# Patient Record
Sex: FEMALE | Race: WHITE | Employment: OTHER | URBAN - METROPOLITAN AREA
[De-identification: names, ages, dates, MRNs, and addresses within clinical notes are randomized per-mention and may not be internally consistent; named-entity substitution may affect disease eponyms.]

---

## 2017-01-04 RX ORDER — CANAGLIFLOZIN 100 MG/1
TABLET, FILM COATED ORAL
Qty: 30 TAB | Refills: 0 | Status: SHIPPED | OUTPATIENT
Start: 2017-01-04 | End: 2017-01-06 | Stop reason: SDUPTHER

## 2017-01-09 RX ORDER — LORAZEPAM 0.5 MG/1
TABLET ORAL
Qty: 60 TAB | Refills: 1 | OUTPATIENT
Start: 2017-01-09 | End: 2017-08-29 | Stop reason: SDUPTHER

## 2017-01-11 RX ORDER — TEMAZEPAM 30 MG/1
CAPSULE ORAL
Qty: 30 CAP | Refills: 2 | OUTPATIENT
Start: 2017-01-11 | End: 2017-04-26 | Stop reason: ALTCHOICE

## 2017-01-20 DIAGNOSIS — Z12.31 VISIT FOR SCREENING MAMMOGRAM: ICD-10-CM

## 2017-02-08 RX ORDER — TRAMADOL HYDROCHLORIDE 50 MG/1
TABLET ORAL
Qty: 60 TAB | Refills: 0 | OUTPATIENT
Start: 2017-02-08

## 2017-02-08 RX ORDER — CANAGLIFLOZIN 100 MG/1
TABLET, FILM COATED ORAL
Qty: 30 TAB | Refills: 0 | Status: SHIPPED | OUTPATIENT
Start: 2017-02-08 | End: 2017-02-16 | Stop reason: SDUPTHER

## 2017-02-16 ENCOUNTER — OFFICE VISIT (OUTPATIENT)
Dept: FAMILY MEDICINE CLINIC | Age: 57
End: 2017-02-16

## 2017-02-16 VITALS
SYSTOLIC BLOOD PRESSURE: 110 MMHG | TEMPERATURE: 98.2 F | HEART RATE: 86 BPM | WEIGHT: 138 LBS | RESPIRATION RATE: 12 BRPM | HEIGHT: 64 IN | OXYGEN SATURATION: 100 % | BODY MASS INDEX: 23.56 KG/M2 | DIASTOLIC BLOOD PRESSURE: 84 MMHG

## 2017-02-16 DIAGNOSIS — E11.9 CONTROLLED TYPE 2 DIABETES MELLITUS WITHOUT COMPLICATION, WITHOUT LONG-TERM CURRENT USE OF INSULIN (HCC): Primary | ICD-10-CM

## 2017-02-16 DIAGNOSIS — J01.00 ACUTE NON-RECURRENT MAXILLARY SINUSITIS: ICD-10-CM

## 2017-02-16 DIAGNOSIS — M51.36 DDD (DEGENERATIVE DISC DISEASE), LUMBAR: ICD-10-CM

## 2017-02-16 RX ORDER — FLUCONAZOLE 150 MG/1
150 TABLET ORAL DAILY
Qty: 2 TAB | Refills: 0 | Status: SHIPPED | OUTPATIENT
Start: 2017-02-16 | End: 2017-02-17

## 2017-02-16 RX ORDER — AMOXICILLIN 875 MG/1
875 TABLET, FILM COATED ORAL 2 TIMES DAILY
Qty: 20 TAB | Refills: 0 | Status: SHIPPED | OUTPATIENT
Start: 2017-02-16 | End: 2017-04-26 | Stop reason: ALTCHOICE

## 2017-02-16 RX ORDER — TRAMADOL HYDROCHLORIDE 50 MG/1
50 TABLET ORAL
Qty: 60 TAB | Refills: 2 | Status: SHIPPED | OUTPATIENT
Start: 2017-02-16 | End: 2017-05-23 | Stop reason: SDUPTHER

## 2017-02-16 NOTE — MR AVS SNAPSHOT
Visit Information Date & Time Provider Department Dept. Phone Encounter #  
 2/16/2017  1:45 PM Nehemiah Lerma NP [facility] [phone] [id number] Follow-up Instructions Return if symptoms worsen or fail to improve. Upcoming Health Maintenance Date Due Pneumococcal 19-64 Medium Risk (1 of 1 - PPSV23) 6/30/1977 DTaP/Tdap/Td series (1 - Tdap) 6/30/1979 EYE EXAM RETINAL OR DILATED Q1 8/18/2016 HEMOGLOBIN A1C Q6M 5/28/2017 FOOT EXAM Q1 11/28/2017 MICROALBUMIN Q1 11/28/2017 LIPID PANEL Q1 11/28/2017 COLONOSCOPY 1/1/2018 BREAST CANCER SCRN MAMMOGRAM 12/29/2018 Allergies as of 2/16/2017  Review Complete On: 2/16/2017 By: Nehemiah Lerma NP No Known Allergies Current Immunizations  Reviewed on 11/23/2015 Name Date Influenza Vaccine 10/29/2013 Influenza Vaccine Mittie Shouts) 12/5/2014 Influenza Vaccine (Quad) PF 11/28/2016, 11/23/2015 Influenza Vaccine Whole 10/1/2011 Not reviewed this visit You Were Diagnosed With   
  
 Codes Comments DDD (degenerative disc disease), lumbar    -  Primary ICD-10-CM: M51.36 
ICD-9-CM: 722.52 Controlled type 2 diabetes mellitus without complication, without long-term current use of insulin ([facility].)     ICD-10-CM: E11.9 ICD-9-CM: 250.00 Acute non-recurrent maxillary sinusitis     ICD-10-CM: J01.00 ICD-9-CM: 461.0 Vitals BP Pulse Temp Resp Height(growth percentile) Weight(growth percentile) 110/84 86 98.2 °F (36.8 °C) 12 5' 4\" (1.626 m) 138 lb (62.6 kg) LMP SpO2 BMI OB Status Smoking Status (LMP Unknown) 100% 23.69 kg/m2 Hysterectomy Never Smoker Vitals History BMI and BSA Data Body Mass Index Body Surface Area  
 23.69 kg/m 2 1.68 m 2 Preferred Pharmacy Pharmacy Name Phone [address] [facility] [address] ([address] [phone] Your Updated Medication List  
  
 This list is accurate as of: 2/16/17  1:51 PM.  Always use your most recent med list.  
  
  
  
  
 [address] [facility] Generic drug:  Lancets MONITOR TWICE DAILY (AS DIRECTED)  
  
 amoxicillin 875 mg tablet Commonly known as:  AMOXIL Take 1 Tab by mouth two (2) times a day. B COMPLEX PO Take  by mouth. baclofen 10 mg tablet Commonly known as:  LIORESAL  
TAKE 1 TABLET THREE TIMES DAILY AS NEEDED FOR SPASMS  
  
 BD Single Use Swabs Regular Padm Generic drug:  alcohol swabs USE WITH BLOOD SUGAR CHECKS Blood-Glucose Meter Misc Commonly known as:  ACCU-CHEK LUCY PLUS METER Bid monitoring dx: 250.00 CALCIUM 500 + D PO Take  by mouth.  
  
 canagliflozin 100 mg tablet Commonly known as:  Levern Pinta TAKE 1 TABLET BY MOUTH DAILY BEFORE BREAKFAST. cholecalciferol (vitamin D3) 2,000 unit Tab Take  by mouth.  
  
 cyanocobalamin 100 mcg tablet Commonly known as:  VITAMIN B12 Take 100 mcg by mouth daily. DULoxetine 30 mg capsule Commonly known as:  CYMBALTA Take 1 Cap by mouth daily. estradiol 0.01 % (0.1 mg/gram) vaginal cream  
Commonly known as:  ESTRACE Insert 2 g into vagina daily. fluconazole 150 mg tablet Commonly known as:  DIFLUCAN Take 1 Tab by mouth daily for 1 day. May repeat in 3 days if needed  
  
 gabapentin 300 mg capsule Commonly known as:  NEURONTIN  
TAKE 1 CAPSULE THREE TIMES DAILY * glucose blood VI test strips strip Commonly known as:  ACCU-CHEK LUCY PLUS TEST STRP Bid monitoring dx: 250.00 * ACCU-CHEK LUCY PLUS TEST STRP strip Generic drug:  glucose blood VI test strips TEST TWICE DAILY HEALTHY EYES PO Take  by mouth. hydroCHLOROthiazide 12.5 mg tablet Commonly known as:  HYDRODIURIL  
TAKE 1 TABLET EVERY DAY  
  
 lidocaine 2 % jelly Commonly known as:  XYLOCAINE  
APPLY  THIN  FILM TOPICALLY TO AFFECTED AREA(S) ONLY (LOW BACK AND NECK ONLY) ONE TIME DAILY AS NEEDED LORazepam 0.5 mg tablet Commonly known as:  ATIVAN  
TAKE 1 TABLET BY MOUTH TWICE DAILY AS NEEDED FOR ANXIETY- MUST LAST 30 DAYS  
  
 meloxicam 15 mg tablet Commonly known as:  MOBIC  
TAKE 1 TABLET EVERY DAY  
  
 metFORMIN  mg tablet Commonly known as:  GLUCOPHAGE XR  
TAKE 2 TABLETS DAILY WITH DINNER  
  
 mometasone 0.1 % topical cream  
Commonly known as:  ELOCON  
APPLY TO THE AFFECTED AREA TWICE A DAY AS NEEDED FOR ALLERGIC RASH  
  
 montelukast 10 mg tablet Commonly known as:  SINGULAIR  
TAKE 1 TABLET EVERY DAY  
  
 MULTIVITAMIN PO Take  by mouth daily. mupirocin 2 % ointment Commonly known as:  [employer] Apply  to affected area two (2) times a day. As needed for skin infection  
  
 nystatin topical cream  
Commonly known as:  MYCOSTATIN  
APPLY TO THE AFFECTED AREA TWICE A DAY  
  
 rosuvastatin 5 mg tablet Commonly known as:  CRESTOR  
TAKE 1 TABLET EVERY NIGHT  
  
 SUMAtriptan 100 mg tablet Commonly known as:  IMITREX Take 1 Tab by mouth once as needed for Migraine for up to 1 dose. SUPER B COMPLEX-VITAMIN C PO Take  by mouth. temazepam 30 mg capsule Commonly known as:  RESTORIL  
TAKE 1 CAPSULE BY MOUTH NIGHTLY AS NEEDED FOR SLEEP  
  
 * topiramate 25 mg tablet Commonly known as:  TOPAMAX 2 po qhs  
  
 * topiramate 50 mg tablet Commonly known as:  TOPAMAX TAKE 1 TABLET TWICE DAILY  
  
 traMADol 50 mg tablet Commonly known as:  ULTRAM  
Take 1 Tab by mouth two (2) times daily as needed for Pain. - must last 30 days  
  
 undecylenic acid-chloroxylenol 25-3 % Soln  
by Apply Externally route. vitamin e 400 unit Tab Take  by mouth. * Notice: This list has 4 medication(s) that are the same as other medications prescribed for you. Read the directions carefully, and ask your doctor or other care provider to review them with you. Prescriptions Printed Refills traMADol (ULTRAM) 50 mg tablet 2 Sig: Take 1 Tab by mouth two (2) times daily as needed for Pain. - must last 30 days Class: Print Route: Oral  
  
Prescriptions Sent to Pharmacy Refills  
 amoxicillin (AMOXIL) 875 mg tablet 0 Sig: Take 1 Tab by mouth two (2) times a day. Class: Normal  
 Pharm[facility] [address] Ph #: [phone] Route: Oral  
 fluconazole (DIFLUCAN) 150 mg tablet 0 Sig: Take 1 Tab by mouth daily for 1 day. May repeat in 3 days if needed Class: Normal  
 Pharm[facility] [address] Ph #: [phone] Route: Oral  
  
Follow-up Instructions Return if symptoms worsen or fail to improve. To-Do List   
 02/16/2017 Lab:  HEMOGLOBIN A1C WITH EAG   
  
 02/16/2017 Lab:  LIPID PANEL   
  
 02/16/2017 Lab:  METABOLIC PANEL, COMPREHENSIVE Patient Instructions Nutrition Tips for Diabetes: After Your Visit Your Care Instructions A healthy diet is important to manage diabetes. It helps you lose weight (if you need to) and keep it off. It gives you the nutrition and energy your body needs and helps prevent heart disease. But a diet for diabetes does not mean that you have to eat special foods. You can eat what your family eats, including occasional sweets and other favorites. But you do have to pay attention to how often you eat and how much you eat of certain foods. The right plan for you will give you meals that help you keep your blood sugar at healthy levels. Try to eat a variety of foods and to spread carbohydrate throughout the day. Carbohydrate raises blood sugar higher and more quickly than any other nutrient does. Carbohydrate is found in sugar, breads and cereals, fruit, starchy vegetables such as potatoes and corn, and milk and yogurt. You may want to work with a dietitian or diabetes educator to help you plan meals and snacks. A dietitian or diabetes educator also can help you lose weight if that is one of your goals. The following tips can help you enjoy your meals and stay healthy. Follow-up care is a key part of your treatment and safety. Be sure to make and go to all appointments, and call your doctor if you are having problems. Its also a good idea to know your test results and keep a list of the medicines you take. How can you care for yourself at home? · Learn which foods have carbohydrate and how much carbohydrate to eat. A dietitian or diabetes educator can help you learn to keep track of how much carbohydrate you eat. · Spread carbohydrate throughout the day. Eat some carbohydrate at all meals, but do not eat too much at any one time. · Plan meals to include food from all the food groups. These are the food groups and some example portion sizes: ¨ Grains: 1 slice of bread (1 ounce), ½ cup of cooked cereal, and 1/3 cup of cooked pasta or rice. These have about 15 grams of carbohydrate in a serving. Choose whole grains such as whole wheat bread or crackers, oatmeal, and brown rice more often than refined grains. ¨ Fruit: 1 small fresh fruit, such as an apple or orange; ½ of a banana; ½ cup of chopped, cooked, or canned fruit; ½ cup of fruit juice; 1 cup of melon or raspberries; and 2 tablespoons of dried fruit. These have about 15 grams of carbohydrate in a serving. ¨ Dairy: 1 cup of nonfat or low-fat milk and 2/3 cup of plain yogurt. These have about 15 grams of carbohydrate in a serving. ¨ Protein foods: Beef, chicken, turkey, fish, eggs, tofu, cheese, cottage cheese, and peanut butter. A serving size of meat is 3 ounces, which is about the size of a deck of cards.  Examples of meat substitute serving sizes (equal to 1 ounce of meat) are 1/4 cup of cottage cheese, 1 egg, 1 tablespoon of peanut butter, and ½ cup of tofu. These have very little or no carbohydrate per serving. ¨ Vegetables: Starchy vegetables such as ½ cup of cooked dried beans, peas, potatoes, or corn have about 15 grams of carbohydrate. Nonstarchy vegetables have very little carbohydrate, such as 1 cup of raw leafy vegetables (such as spinach), ½ cup of other vegetables (cooked or chopped), and 3/4 cup of vegetable juice. · Use the plate format to plan meals. It is a good, quick way to make sure that you have a balanced meal. It also helps you spread carbohydrate throughout the day. You divide your plate by types of foods. Put vegetables on half the plate, meat or meat substitutes on one-quarter of the plate, and a grain or starchy vegetable (such as brown rice or a potato) in the final quarter of the plate. To this you can add a small piece of fruit and 1 cup of milk or yogurt, depending on how much carbohydrate you are supposed to eat at a meal. 
· Talk to your dietitian or diabetes educator about ways to add limited amounts of sweets into your meal plan. You can eat these foods now and then, as long as you include the amount of carbohydrate they have in your daily carbohydrate allowance. · If you drink alcohol, limit it to no more than 1 drink a day for women and 2 drinks a day for men. If you are pregnant, no amount of alcohol is known to be safe. · Protein, fat, and fiber do not raise blood sugar as much as carbohydrate does. If you eat a lot of these nutrients in a meal, your blood sugar will rise more slowly than it would otherwise. · Limit saturated fats, such as those from meat and dairy products. Try to replace it with monounsaturated fat, such as olive oil. This is a healthier choice because people who have diabetes are at higher-than-average risk of heart disease. But use a modest amount of olive oil. A tablespoon of olive oil has 14 grams of fat and 120 calories. · Exercise lowers blood sugar. If you take insulin by shots or pump, you can use less than you would if you were not exercising. Keep in mind that timing matters. If you exercise within 1 hour after a meal, your body may need less insulin for that meal than it would if you exercised 3 hours after the meal. Test your blood sugar to find out how exercise affects your need for insulin. · Exercise on most days of the week. Aim for at least 30 minutes. Exercise helps you stay at a healthy weight and helps your body use insulin. Walking is an easy way to get exercise. Gradually increase the amount you walk every day. You also may want to swim, bike, or do other activities. When you eat out · Learn to estimate the serving sizes of foods that have carbohydrate. If you measure food at home, it will be easier to estimate the amount in a serving of restaurant food. · If the meal you order has too much carbohydrate (such as potatoes, corn, or baked beans), ask to have a low-carbohydrate food instead. Ask for a salad or green vegetables. · If you use insulin, check your blood sugar before and after eating out to help you plan how much to eat in the future. · If you eat more carbohydrate at a meal than you had planned, take a walk or do other exercise. This will help lower your blood sugar. Where can you learn more? Go to [employer] Enter P607 in the search box to learn more about \"Nutrition Tips for Diabetes: After Your Visit. \"  
© 1943-5889 [employer]. Care instructions adapted under license by Jenny Kaur (which disclaims liability or warranty for this information). This care instruction is for use with your licensed healthcare professional. If you have questions about a medical condition or this instruction, always ask your healthcare professional. [address] any warranty or liability for your use of this information. Content Version: 50.9.773222; Current as of: June 4, 2014 Introducing [facility] SERVICES! Dear Aristides Colvin: 
Thank you for requesting a [employer] account. Our records indicate that you already have an active [employer] account. You can access your account anytime at [email or web address]. [email or web address] Did you know that you can access your hospital and ER discharge instructions at any time in [employer]? You can also review all of your test results from your hospital stay or ER visit. Additional Information If you have questions, please visit the Frequently Asked Questions section of the [employer] website at [email or web address] Remember, [employer] is NOT to be used for urgent needs. For medical emergencies, dial 911. Now available from your iPhone and Android! Please provide this summary of care documentation to your next provider. Your primary care clinician is listed as Lacey Jones. If you have any questions after today's visit, please call [phone].

## 2017-02-16 NOTE — PATIENT INSTRUCTIONS

## 2017-02-16 NOTE — PROGRESS NOTES
Chief Complaint   Patient presents with    Medication Refill    Labs    Sinus Infection     she is a 60y.o. year old female who presents for evalution. Pt here for Tramadol refills. Also has been sinus pain and pressure intermittently for past few weeks. Seems like worsening in past week. Lots of thick congestion, coughing a lot and keeping pt up at night. Bad headaches. No fever or chills. Pt also needs DM labs, states sugars have been running around 165. Takes medication daily. Had eye exam in December which was normal.  No episodes of hypoglycemia. Reviewed PmHx, RxHx, FmHx, SocHx, AllgHx and updated and dated in the chart. Review of Systems - negative except as listed above in the HPI    Objective:     Vitals:    02/16/17 1329   BP: 110/84   Pulse: 86   Resp: 12   Temp: 98.2 °F (36.8 °C)   SpO2: 100%   Weight: 138 lb (62.6 kg)   Height: 5' 4\" (1.626 m)     Physical Examination: General appearance - alert, well appearing, and in no distress  Ears - bilateral TM's and external ear canals normal  Nose - mucosal congestion, mucosal erythema and sinus tenderness noted frontal  Mouth - mucous membranes moist, pharynx normal without lesions and erythematous  Neck - supple, no significant adenopathy  Chest - clear to auscultation, no wheezes, rales or rhonchi, symmetric air entry  Heart - normal rate, regular rhythm, normal S1, S2, no murmurs, rubs, clicks or gallops  Back exam - antalgic gait, limited range of motion, pain with motion noted during exam    Assessment/ Plan:   Cyndi Morrison was seen today for medication refill, labs and sinus infection. Diagnoses and all orders for this visit:    Controlled type 2 diabetes mellitus without complication, without long-term current use of insulin ([address]  -     METABOLIC PANEL, COMPREHENSIVE; Future  -     LIPID PANEL; Future  -     HEMOGLOBIN A1C WITH EAG; Future  Discussed importance of diabetic diet, need for regular exercise.   Reviewed disease process and complications that arise from poor control of blood sugars - kidney damage, blindness, amputation, paresthesias. Goal of HgbA1c below 7 and LDL below 100. Need for yearly eye exam and regular foot care. Cont current medications, F/U 3 mo. Acute non-recurrent maxillary sinusitis  -     amoxicillin (AMOXIL) 875 mg tablet; Take 1 Tab by mouth two (2) times a day. -     fluconazole (DIFLUCAN) 150 mg tablet; Take 1 Tab by mouth daily for 1 day. May repeat in 3 days if needed  New rxs. Discussed and encouraged rest and clear fluids, if congestion is thick should avoid dairy. Recommended hot showers with steam and elevating head of bed. May use Vitamin C or Echinacea in addition to current therapy. DDD (degenerative disc disease), lumbar  -     traMADol (ULTRAM) 50 mg tablet; Take 1 Tab by mouth two (2) times daily as needed for Pain. - must last 30 days  Reviewed , pt taking appropriately. Refill provided. Pt voiced understanding regarding plan of care. Follow-up Disposition:  Return if symptoms worsen or fail to improve. I have discussed the diagnosis with the patient and the intended plan as seen in the above orders. The patient has received an after-visit summary and questions were answered concerning future plans.      Medication Side Effects and Warnings were discussed with patient    Lila Rand NP

## 2017-02-17 RX ORDER — BLOOD SUGAR DIAGNOSTIC
STRIP MISCELLANEOUS
Qty: 200 STRIP | Refills: 3 | Status: SHIPPED | OUTPATIENT
Start: 2017-02-17 | End: 2017-04-19

## 2017-02-17 RX ORDER — LANCETS
EACH MISCELLANEOUS
Qty: 200 EACH | Refills: 3 | Status: SHIPPED | OUTPATIENT
Start: 2017-02-17 | End: 2017-04-19

## 2017-02-17 RX ORDER — MOMETASONE FUROATE 1 MG/G
CREAM TOPICAL
Qty: 15 G | Refills: 0 | Status: SHIPPED | OUTPATIENT
Start: 2017-02-17 | End: 2018-06-11 | Stop reason: SDUPTHER

## 2017-02-17 RX ORDER — LIDOCAINE HYDROCHLORIDE 20 MG/ML
JELLY TOPICAL
Qty: 30 ML | Refills: 1 | Status: SHIPPED | OUTPATIENT
Start: 2017-02-17 | End: 2017-07-06 | Stop reason: SDUPTHER

## 2017-02-17 RX ORDER — ISOPROPYL ALCOHOL 0.75 G/1
SWAB TOPICAL
Qty: 300 PAD | Refills: 3 | Status: SHIPPED | OUTPATIENT
Start: 2017-02-17 | End: 2017-04-19

## 2017-04-13 ENCOUNTER — OFFICE VISIT (OUTPATIENT)
Dept: FAMILY MEDICINE CLINIC | Age: 57
End: 2017-04-13

## 2017-04-13 VITALS
OXYGEN SATURATION: 98 % | HEART RATE: 104 BPM | RESPIRATION RATE: 16 BRPM | SYSTOLIC BLOOD PRESSURE: 122 MMHG | BODY MASS INDEX: 23.08 KG/M2 | TEMPERATURE: 98.5 F | DIASTOLIC BLOOD PRESSURE: 82 MMHG | HEIGHT: 64 IN | WEIGHT: 135.2 LBS

## 2017-04-13 DIAGNOSIS — R82.90 ABNORMAL URINALYSIS: Primary | ICD-10-CM

## 2017-04-13 DIAGNOSIS — E11.9 CONTROLLED TYPE 2 DIABETES MELLITUS WITHOUT COMPLICATION, WITHOUT LONG-TERM CURRENT USE OF INSULIN (HCC): ICD-10-CM

## 2017-04-13 DIAGNOSIS — R35.0 URINARY FREQUENCY: ICD-10-CM

## 2017-04-13 DIAGNOSIS — Z98.84 STATUS POST GASTRIC BANDING: ICD-10-CM

## 2017-04-13 LAB
BILIRUB UR QL STRIP: NEGATIVE
GLUCOSE UR-MCNC: NORMAL MG/DL
KETONES P FAST UR STRIP-MCNC: NORMAL MG/DL
PH UR STRIP: 5 [PH] (ref 4.6–8)
PROT UR QL STRIP: NEGATIVE MG/DL
SP GR UR STRIP: 1.01 (ref 1–1.03)
UA UROBILINOGEN AMB POC: NORMAL (ref 0.2–1)
URINALYSIS CLARITY POC: CLEAR
URINALYSIS COLOR POC: YELLOW
URINE BLOOD POC: NORMAL
URINE LEUKOCYTES POC: NEGATIVE
URINE NITRITES POC: NEGATIVE

## 2017-04-13 RX ORDER — NITROFURANTOIN 25; 75 MG/1; MG/1
100 CAPSULE ORAL 2 TIMES DAILY
Qty: 14 CAP | Refills: 0 | Status: SHIPPED | OUTPATIENT
Start: 2017-04-13 | End: 2017-04-20

## 2017-04-13 NOTE — MR AVS SNAPSHOT
Visit Information Date & Time Provider Department Dept. Phone Encounter #  
 4/13/2017 11:20 AM Castillo Liang MD [address] [facility] [phone] [id number] Upcoming Health Maintenance Date Due Pneumococcal 19-64 Medium Risk (1 of 1 - PPSV23) 6/30/1977 DTaP/Tdap/Td series (1 - Tdap) 6/30/1979 EYE EXAM RETINAL OR DILATED Q1 8/18/2016 HEMOGLOBIN A1C Q6M 5/28/2017 FOOT EXAM Q1 11/28/2017 MICROALBUMIN Q1 11/28/2017 LIPID PANEL Q1 11/28/2017 COLONOSCOPY 1/1/2018 BREAST CANCER SCRN MAMMOGRAM 12/29/2018 Allergies as of 4/13/2017  Review Complete On: 4/13/2017 By: Castillo Liang MD  
 No Known Allergies Current Immunizations  Reviewed on 11/23/2015 Name Date Influenza Vaccine 10/29/2013 Influenza Vaccine Rehan Wendi) 12/5/2014 Influenza Vaccine (Quad) PF 11/28/2016, 11/23/2015 Influenza Vaccine Whole 10/1/2011 Not reviewed this visit You Were Diagnosed With   
  
 Codes Comments Abnormal urinalysis    -  Primary ICD-10-CM: R82.90 ICD-9-CM: 791.9 Controlled type 2 diabetes mellitus without complication, without long-term current use of insulin ([facility].)     ICD-10-CM: E11.9 ICD-9-CM: 250.00 Urinary frequency     ICD-10-CM: R35.0 ICD-9-CM: 788.41 Status post gastric banding     ICD-10-CM: Z98.84 ICD-9-CM: V45.86 Vitals BP Pulse Temp Resp Height(growth percentile) Weight(growth percentile) 122/82 (BP 1 Location: Left arm, BP Patient Position: Sitting) (!) 104 98.5 °F (36.9 °C) (Oral) 16 5' 4\" (1.626 m) 135 lb 3.2 oz (61.3 kg) LMP SpO2 BMI OB Status Smoking Status (LMP Unknown) 98% 23.21 kg/m2 Hysterectomy Never Smoker Vitals History BMI and BSA Data Body Mass Index Body Surface Area  
 23.21 kg/m 2 1.66 m 2 Preferred Pharmacy Pharmacy Name Phone [address] [facility], [facility] [address] ([phone] Your Updated Medication List  
  
   
This list is accurate as of: 4/13/17 11:50 AM.  Always use your most recent med list.  
  
  
  
  
 Hardeep Bhat Generic drug:  Lancets MONITOR TWICE DAILY (AS DIRECTED)  
  
 amoxicillin 875 mg tablet Commonly known as:  AMOXIL Take 1 Tab by mouth two (2) times a day. B COMPLEX PO Take  by mouth. baclofen 10 mg tablet Commonly known as:  LIORESAL  
TAKE 1 TABLET THREE TIMES DAILY AS NEEDED FOR SPASMS  
  
 BD Single Use Swabs Regular Padm Generic drug:  alcohol swabs USE WITH BLOOD SUGAR CHECKS Blood-Glucose Meter Misc Commonly known as:  ACCU-CHEK LUCY PLUS METER Bid monitoring dx: 250.00 CALCIUM 500 + D PO Take  by mouth.  
  
 canagliflozin 100 mg tablet Commonly known as:  Rozanna Rota TAKE 1 TABLET BY MOUTH DAILY BEFORE BREAKFAST. cholecalciferol (vitamin D3) 2,000 unit Tab Take  by mouth.  
  
 cyanocobalamin 100 mcg tablet Commonly known as:  VITAMIN B12 Take 100 mcg by mouth daily. DULoxetine 30 mg capsule Commonly known as:  CYMBALTA Take 1 Cap by mouth daily. estradiol 0.01 % (0.1 mg/gram) vaginal cream  
Commonly known as:  ESTRACE Insert 2 g into vagina daily. gabapentin 300 mg capsule Commonly known as:  NEURONTIN  
TAKE 1 CAPSULE THREE TIMES DAILY * glucose blood VI test strips strip Commonly known as:  ACCU-CHEK LUCY PLUS TEST STRP Bid monitoring dx: 250.00 * ACCU-CHEK LUCY PLUS TEST STRP strip Generic drug:  glucose blood VI test strips TEST TWO TIMES DAILY HEALTHY EYES PO Take  by mouth. hydroCHLOROthiazide 12.5 mg tablet Commonly known as:  HYDRODIURIL  
TAKE 1 TABLET EVERY DAY  
  
 lidocaine 2 % jelly Commonly known as:  XYLOCAINE  
APPLY A THIN FILM TO AFFECTED AREA ONLY(LOW BACK AND NECK ONLY) ONE TIME DAILY AS NEEDED LORazepam 0.5 mg tablet Commonly known as:  ATIVAN  
 TAKE 1 TABLET BY MOUTH TWICE DAILY AS NEEDED FOR ANXIETY- MUST LAST 30 DAYS  
  
 meloxicam 15 mg tablet Commonly known as:  MOBIC  
TAKE 1 TABLET EVERY DAY  
  
 metFORMIN  mg tablet Commonly known as:  GLUCOPHAGE XR  
TAKE 2 TABLETS DAILY WITH DINNER  
  
 mometasone 0.1 % topical cream  
Commonly known as:  ELOCON  
APPLY TO THE AFFECTED AREA TWICE A DAY AS NEEDED FOR ALLERGIC RASH  
  
 montelukast 10 mg tablet Commonly known as:  SINGULAIR  
TAKE 1 TABLET EVERY DAY  
  
 MULTIVITAMIN PO Take  by mouth daily. mupirocin 2 % ointment Commonly known as:  [employer] Apply  to affected area two (2) times a day. As needed for skin infection  
  
 nitrofurantoin (macrocrystal-monohydrate) 100 mg capsule Commonly known as:  MACROBID Take 1 Cap by mouth two (2) times a day for 7 days. nystatin topical cream  
Commonly known as:  MYCOSTATIN  
APPLY TO THE AFFECTED AREA TWICE A DAY  
  
 rosuvastatin 5 mg tablet Commonly known as:  CRESTOR  
TAKE 1 TABLET EVERY NIGHT  
  
 SUMAtriptan 100 mg tablet Commonly known as:  IMITREX Take 1 Tab by mouth once as needed for Migraine for up to 1 dose. SUPER B COMPLEX-VITAMIN C PO Take  by mouth. temazepam 30 mg capsule Commonly known as:  RESTORIL  
TAKE 1 CAPSULE BY MOUTH NIGHTLY AS NEEDED FOR SLEEP  
  
 * topiramate 25 mg tablet Commonly known as:  TOPAMAX 2 po qhs  
  
 * topiramate 50 mg tablet Commonly known as:  TOPAMAX TAKE 1 TABLET TWICE DAILY  
  
 traMADol 50 mg tablet Commonly known as:  ULTRAM  
Take 1 Tab by mouth two (2) times daily as needed for Pain. - must last 30 days  
  
 undecylenic acid-chloroxylenol 25-3 % Soln  
by Apply Externally route. vitamin e 400 unit Tab Take  by mouth. * Notice: This list has 4 medication(s) that are the same as other medications prescribed for you. Read the directions carefully, and ask your doctor or other care provider to review them with you. Prescriptions Sent to Pharmacy Refills  
 nitrofurantoin, macrocrystal-monohydrate, (MACROBID) 100 mg capsule 0 Sig: Take 1 Cap by mouth two (2) times a day for 7 days. Class: Normal  
 Pharm[facility]: [facility] [address] #: [phone] Route: Oral  
  
We Performed the Following AMB POC URINALYSIS DIP STICK AUTO W/O MICRO [46247 CPT(R)] CBC WITH AUTOMATED DIFF [13147 CPT(R)] HEMOGLOBIN A1C WITH EAG [31673 CPT(R)] METABOLIC PANEL, COMPREHENSIVE [05778 CPT(R)] TSH 3RD GENERATION [54842 CPT(R)] VITAMIN B12 [id number] CPT(R)] Introducing [facility] SERVICES! Dear Jorge Alberto Francis: 
Thank you for requesting a [employer] account. Our records indicate that you already have an active [employer] account. You can access your account anytime at [email or web address]. [email or web address] Did you know that you can access your hospital and ER discharge instructions at any time in [employer]? You can also review all of your test results from your hospital stay or ER visit. Additional Information If you have questions, please visit the Frequently Asked Questions section of the [employer] website at [email or web address] Remember, [employer] is NOT to be used for urgent needs. For medical emergencies, dial 911. Now available from your iPhone and Android! Please provide this summary of care documentation to your next provider. Your primary care clinician is listed as Dane Mccormick. If you have any questions after today's visit, please call [phone].

## 2017-04-13 NOTE — PROGRESS NOTES
Chief Complaint   Patient presents with    Nausea      Light headed, Fatique, Dizziness Fran 1 week    Other     Frequent urination     Pt is schedule to see Dr. Chacho Alejandre next Wednesday for recurrent sinusitis. Pt reports that her blood sugars have been running high, has been very thirsty. Pt also reports feeling nauseated with some gagging. Subjective: (As above and below)     Chief Complaint   Patient presents with    Nausea      Light headed, Fatique, Dizziness Fran 1 week    Other     Frequent urination     she is a 60y.o. year old female who presents for evaluation. Reviewed PmHx, RxHx, FmHx, SocHx, AllgHx and updated in chart. Review of Systems - negative except as listed above    Objective:     Vitals:    04/13/17 1125   BP: 122/82   Pulse: (!) 104   Resp: 16   Temp: 98.5 °F (36.9 °C)   TempSrc: Oral   SpO2: 98%   Weight: 135 lb 3.2 oz (61.3 kg)   Height: 5' 4\" (1.626 m)     Physical Examination: General appearance - in mild to moderate distress and laying down on exam table  Mental status - alert, oriented to person, place, and time  Mouth - mucous membranes moist, pharynx normal without lesions  Chest - clear to auscultation, no wheezes, rales or rhonchi, symmetric air entry  Heart - normal rate, regular rhythm, normal S1, S2, no murmurs, rubs, clicks or gallops  Musculoskeletal - no joint tenderness, deformity or swelling    Assessment/ Plan:   1. Abnormal urinalysis  -treat for UTI  -glucosuria due to invokana  - AMB POC URINALYSIS DIP STICK AUTO W/O MICRO  - nitrofurantoin, macrocrystal-monohydrate, (MACROBID) 100 mg capsule; Take 1 Cap by mouth two (2) times a day for 7 days. Dispense: 14 Cap; Refill: 0    2. Controlled type 2 diabetes mellitus without complication, without long-term current use of insulin (HCC)  -check fasting labs  - CBC WITH AUTOMATED DIFF  - METABOLIC PANEL, COMPREHENSIVE  - TSH 3RD GENERATION  - HEMOGLOBIN A1C WITH EAG    3. Urinary frequency  -see above    4.  Status post gastric banding  -check lab levels  - VITAMIN B12     Follow-up Disposition: As needed  I have discussed the diagnosis with the patient and the intended plan as seen in the above orders. The patient has received an after-visit summary and questions were answered concerning future plans.      Medication Side Effects and Warnings were discussed with patient: yes  Patient Labs were reviewed: yes  Patient Past Records were reviewed:  yes    Nicko Simmons M.D.

## 2017-04-14 LAB
ALBUMIN SERPL-MCNC: 4.4 G/DL (ref 3.5–5.5)
ALBUMIN/GLOB SERPL: 1.8 {RATIO} (ref 1.2–2.2)
ALP SERPL-CCNC: 76 IU/L (ref 39–117)
ALT SERPL-CCNC: 22 IU/L (ref 0–32)
AST SERPL-CCNC: 18 IU/L (ref 0–40)
BASOPHILS # BLD AUTO: 0 X10E3/UL (ref 0–0.2)
BASOPHILS NFR BLD AUTO: 0 %
BILIRUB SERPL-MCNC: 1.4 MG/DL (ref 0–1.2)
BUN SERPL-MCNC: 20 MG/DL (ref 6–24)
BUN/CREAT SERPL: 21 (ref 9–23)
CALCIUM SERPL-MCNC: 10.3 MG/DL (ref 8.7–10.2)
CHLORIDE SERPL-SCNC: 93 MMOL/L (ref 96–106)
CO2 SERPL-SCNC: 22 MMOL/L (ref 18–29)
CREAT SERPL-MCNC: 0.94 MG/DL (ref 0.57–1)
EOSINOPHIL # BLD AUTO: 0.1 X10E3/UL (ref 0–0.4)
EOSINOPHIL NFR BLD AUTO: 1 %
ERYTHROCYTE [DISTWIDTH] IN BLOOD BY AUTOMATED COUNT: 13.4 % (ref 12.3–15.4)
EST. AVERAGE GLUCOSE BLD GHB EST-MCNC: 275 MG/DL
GLOBULIN SER CALC-MCNC: 2.5 G/DL (ref 1.5–4.5)
GLUCOSE SERPL-MCNC: 258 MG/DL (ref 65–99)
HBA1C MFR BLD: 11.2 % (ref 4.8–5.6)
HCT VFR BLD AUTO: 46.1 % (ref 34–46.6)
HGB BLD-MCNC: 15.5 G/DL (ref 11.1–15.9)
IMM GRANULOCYTES # BLD: 0 X10E3/UL (ref 0–0.1)
IMM GRANULOCYTES NFR BLD: 0 %
LYMPHOCYTES # BLD AUTO: 1.8 X10E3/UL (ref 0.7–3.1)
LYMPHOCYTES NFR BLD AUTO: 28 %
MCH RBC QN AUTO: 30.6 PG (ref 26.6–33)
MCHC RBC AUTO-ENTMCNC: 33.6 G/DL (ref 31.5–35.7)
MCV RBC AUTO: 91 FL (ref 79–97)
MONOCYTES # BLD AUTO: 0.3 X10E3/UL (ref 0.1–0.9)
MONOCYTES NFR BLD AUTO: 5 %
NEUTROPHILS # BLD AUTO: 4.1 X10E3/UL (ref 1.4–7)
NEUTROPHILS NFR BLD AUTO: 66 %
PLATELET # BLD AUTO: 179 X10E3/UL (ref 150–379)
POTASSIUM SERPL-SCNC: 4 MMOL/L (ref 3.5–5.2)
PROT SERPL-MCNC: 6.9 G/DL (ref 6–8.5)
RBC # BLD AUTO: 5.06 X10E6/UL (ref 3.77–5.28)
SODIUM SERPL-SCNC: 137 MMOL/L (ref 134–144)
TSH SERPL DL<=0.005 MIU/L-ACNC: 0.4 UIU/ML (ref 0.45–4.5)
VIT B12 SERPL-MCNC: >2000 PG/ML (ref 211–946)
WBC # BLD AUTO: 6.3 X10E3/UL (ref 3.4–10.8)

## 2017-04-14 NOTE — PROGRESS NOTES
Thyroid borderline overactive, recommend rechecking in 4 weeks  Significant loss of diabetic control, blood sugar are very elevated. Recommend increasing both invokana and metformin. Please advise if you agree. All other labs are within normal limits. A message has been sent in [employer] and the lab work released to the patient.

## 2017-04-19 ENCOUNTER — APPOINTMENT (OUTPATIENT)
Dept: GENERAL RADIOLOGY | Age: 57
End: 2017-04-19
Attending: EMERGENCY MEDICINE
Payer: MEDICARE

## 2017-04-19 ENCOUNTER — HOSPITAL ENCOUNTER (EMERGENCY)
Age: 57
Discharge: HOME | End: 2017-04-19
Attending: EMERGENCY MEDICINE
Payer: MEDICARE

## 2017-04-19 VITALS
HEIGHT: 64 IN | WEIGHT: 132.38 LBS | SYSTOLIC BLOOD PRESSURE: 116 MMHG | DIASTOLIC BLOOD PRESSURE: 61 MMHG | RESPIRATION RATE: 16 BRPM | OXYGEN SATURATION: 97 % | BODY MASS INDEX: 22.6 KG/M2 | TEMPERATURE: 98.1 F | HEART RATE: 104 BPM

## 2017-04-19 DIAGNOSIS — R11.10 DRY HEAVES: Primary | ICD-10-CM

## 2017-04-19 DIAGNOSIS — R53.83 FATIGUE, UNSPECIFIED TYPE: ICD-10-CM

## 2017-04-19 DIAGNOSIS — R63.4 WEIGHT LOSS, NON-INTENTIONAL: ICD-10-CM

## 2017-04-19 LAB
ALBUMIN SERPL BCP-MCNC: 4.4 G/DL (ref 3.5–5)
ALBUMIN/GLOB SERPL: 1.2 {RATIO} (ref 1.1–2.2)
ALP SERPL-CCNC: 88 U/L (ref 45–117)
ALT SERPL-CCNC: 36 U/L (ref 12–78)
ANION GAP BLD CALC-SCNC: 18 MMOL/L (ref 5–15)
APPEARANCE UR: CLEAR
AST SERPL W P-5'-P-CCNC: 13 U/L (ref 15–37)
BACTERIA URNS QL MICRO: NEGATIVE /HPF
BASOPHILS # BLD AUTO: 0 K/UL (ref 0–0.1)
BASOPHILS # BLD: 0 % (ref 0–1)
BILIRUB SERPL-MCNC: 1.7 MG/DL (ref 0.2–1)
BILIRUB UR QL: NEGATIVE
BUN SERPL-MCNC: 19 MG/DL (ref 6–20)
BUN/CREAT SERPL: 18 (ref 12–20)
CALCIUM SERPL-MCNC: 11 MG/DL (ref 8.5–10.1)
CHLORIDE SERPL-SCNC: 97 MMOL/L (ref 97–108)
CK SERPL-CCNC: 42 U/L (ref 26–192)
CO2 SERPL-SCNC: 21 MMOL/L (ref 21–32)
COLOR UR: ABNORMAL
CREAT SERPL-MCNC: 1.03 MG/DL (ref 0.55–1.02)
EOSINOPHIL # BLD: 0 K/UL (ref 0–0.4)
EOSINOPHIL NFR BLD: 0 % (ref 0–7)
EPITH CASTS URNS QL MICRO: ABNORMAL /LPF
ERYTHROCYTE [DISTWIDTH] IN BLOOD BY AUTOMATED COUNT: 12.4 % (ref 11.5–14.5)
GLOBULIN SER CALC-MCNC: 3.6 G/DL (ref 2–4)
GLUCOSE SERPL-MCNC: 174 MG/DL (ref 65–100)
GLUCOSE UR STRIP.AUTO-MCNC: >1000 MG/DL
HCT VFR BLD AUTO: 47.7 % (ref 35–47)
HGB BLD-MCNC: 16.4 G/DL (ref 11.5–16)
HGB UR QL STRIP: NEGATIVE
HYALINE CASTS URNS QL MICRO: ABNORMAL /LPF (ref 0–5)
KETONES UR QL STRIP.AUTO: >80 MG/DL
LEUKOCYTE ESTERASE UR QL STRIP.AUTO: NEGATIVE
LYMPHOCYTES # BLD AUTO: 10 % (ref 12–49)
LYMPHOCYTES # BLD: 1.1 K/UL (ref 0.8–3.5)
MCH RBC QN AUTO: 30.7 PG (ref 26–34)
MCHC RBC AUTO-ENTMCNC: 34.4 G/DL (ref 30–36.5)
MCV RBC AUTO: 89.2 FL (ref 80–99)
MONOCYTES # BLD: 0.4 K/UL (ref 0–1)
MONOCYTES NFR BLD AUTO: 4 % (ref 5–13)
NEUTS SEG # BLD: 9.7 K/UL (ref 1.8–8)
NEUTS SEG NFR BLD AUTO: 86 % (ref 32–75)
NITRITE UR QL STRIP.AUTO: NEGATIVE
PH UR STRIP: 5.5 [PH] (ref 5–8)
PLATELET # BLD AUTO: 202 K/UL (ref 150–400)
POTASSIUM SERPL-SCNC: 4 MMOL/L (ref 3.5–5.1)
PROT SERPL-MCNC: 8 G/DL (ref 6.4–8.2)
PROT UR STRIP-MCNC: NEGATIVE MG/DL
RBC # BLD AUTO: 5.35 M/UL (ref 3.8–5.2)
RBC #/AREA URNS HPF: ABNORMAL /HPF (ref 0–5)
SODIUM SERPL-SCNC: 136 MMOL/L (ref 136–145)
SP GR UR REFRACTOMETRY: 1.02 (ref 1–1.03)
T4 FREE SERPL-MCNC: 1.3 NG/DL (ref 0.8–1.5)
TSH SERPL DL<=0.05 MIU/L-ACNC: 0.54 UIU/ML (ref 0.36–3.74)
UROBILINOGEN UR QL STRIP.AUTO: 0.2 EU/DL (ref 0.2–1)
WBC # BLD AUTO: 11.3 K/UL (ref 3.6–11)
WBC URNS QL MICRO: ABNORMAL /HPF (ref 0–4)

## 2017-04-19 PROCEDURE — 81001 URINALYSIS AUTO W/SCOPE: CPT | Performed by: EMERGENCY MEDICINE

## 2017-04-19 PROCEDURE — 82550 ASSAY OF CK (CPK): CPT | Performed by: EMERGENCY MEDICINE

## 2017-04-19 PROCEDURE — 99283 EMERGENCY DEPT VISIT LOW MDM: CPT

## 2017-04-19 PROCEDURE — 84443 ASSAY THYROID STIM HORMONE: CPT | Performed by: EMERGENCY MEDICINE

## 2017-04-19 PROCEDURE — 96360 HYDRATION IV INFUSION INIT: CPT

## 2017-04-19 PROCEDURE — 74011250636 HC RX REV CODE- 250/636: Performed by: EMERGENCY MEDICINE

## 2017-04-19 PROCEDURE — 85025 COMPLETE CBC W/AUTO DIFF WBC: CPT | Performed by: EMERGENCY MEDICINE

## 2017-04-19 PROCEDURE — 36415 COLL VENOUS BLD VENIPUNCTURE: CPT | Performed by: EMERGENCY MEDICINE

## 2017-04-19 PROCEDURE — 71020 XR CHEST PA LAT: CPT

## 2017-04-19 PROCEDURE — 84439 ASSAY OF FREE THYROXINE: CPT | Performed by: EMERGENCY MEDICINE

## 2017-04-19 PROCEDURE — 93005 ELECTROCARDIOGRAM TRACING: CPT

## 2017-04-19 PROCEDURE — 80053 COMPREHEN METABOLIC PANEL: CPT | Performed by: EMERGENCY MEDICINE

## 2017-04-19 RX ADMIN — SODIUM CHLORIDE 1000 ML: 900 INJECTION, SOLUTION INTRAVENOUS at 16:32

## 2017-04-19 NOTE — DISCHARGE INSTRUCTIONS
Fatigue: Care Instructions  Your Care Instructions  Fatigue is a feeling of tiredness, exhaustion, or lack of energy. You may feel fatigue because of too much or not enough activity. It can also come from stress, lack of sleep, boredom, and poor diet. Many medical problems, such as viral infections, can cause fatigue. Emotional problems, especially depression, are often the cause of fatigue. Fatigue is most often a symptom of another problem. Treatment for fatigue depends on the cause. For example, if you have fatigue because you have a certain health problem, treating this problem also treats your fatigue. If depression or anxiety is the cause, treatment may help. Follow-up care is a key part of your treatment and safety. Be sure to make and go to all appointments, and call your doctor if you are having problems. It's also a good idea to know your test results and keep a list of the medicines you take. How can you care for yourself at home? · Get regular exercise. But don't overdo it. Go back and forth between rest and exercise. · Get plenty of rest.  · Eat a healthy diet. Do not skip meals, especially breakfast.  · Reduce your use of caffeine, tobacco, and alcohol. Caffeine is most often found in coffee, tea, cola drinks, and chocolate. · Limit medicines that can cause fatigue. This includes tranquilizers and cold and allergy medicines. When should you call for help? Watch closely for changes in your health, and be sure to contact your doctor if:  · You have new symptoms such as fever or a rash. · Your fatigue gets worse. · You have been feeling down, depressed, or hopeless. Or you may have lost interest in things that you usually enjoy. · You are not getting better as expected. Where can you learn more? Go to http://anish-andrews.info/. Enter S742 in the search box to learn more about \"Fatigue: Care Instructions. \"  Current as of: May 27, 2016  Content Version: 11.2  © 3884-1809 [employer]. Care instructions adapted under license by marker[employer] (which disclaims liability or warranty for this information). If you have questions about a medical condition or this instruction, always ask your healthcare professional. [address] any warranty or liability for your use of this information. Abnormal Weight Loss: Care Instructions  Your Care Instructions  There are two types of weight loss--normal and abnormal. The normal kind happens when you are trying to lose weight by exercising more or eating less. The abnormal kind happens when you are not trying to lose weight. Many medical problems can cause abnormal weight loss. These include problems with your thyroid gland, long-term infections, mouth or throat problems that make it hard to eat, and digestive problems. They also include depression and cancer. Some medicines also may cause you to lose weight. You can work with your doctor to find the cause of your weight loss. You will probably need tests to do this. Follow-up care is a key part of your treatment and safety. Be sure to make and go to all appointments, and call your doctor if you are having problems. It's also a good idea to know your test results and keep a list of the medicines you take. How can you care for yourself at home? · Weigh yourself at the same time every day. It's best to do it first thing in the morning after you empty your bladder. Be sure to always wear the same amount of clothing. · Write down any changes in your weight and the possible causes. Discuss these with your doctor. · Your doctor may want you to change your diet. Do your best to follow his or her advice. · Ask your doctor if you should see a dietitian. This is a person who can help you plan meals that work best for your lifestyle. · Note any changes in bowel habits. These may include changes in how often you have a bowel movement.  Other changes include the color and size of your stools and how solid they are. · If you are prescribed medicines, take them exactly as prescribed. Call your doctor if you think you are having a problem with your medicine. You will get more details on the specific medicines your doctor prescribes. When should you call for help? Watch closely for changes in your health, and be sure to contact your doctor if:  · You do not get better as expected. · You continue to lose weight. Where can you learn more? Go to http://anish-andrews.info/. Enter A790 in the search box to learn more about \"Abnormal Weight Loss: Care Instructions. \"  Current as of: October 13, 2016  Content Version: 11.2  © 3891-9912 [employer]. Care instructions adapted under license by [employer] (which disclaims liability or warranty for this information). If you have questions about a medical condition or this instruction, always ask your healthcare professional. [address] any warranty or liability for your use of this information. We hope that we have addressed all of your medical concerns. The examination and treatment you received in the Emergency Department were for an emergent problem and were not intended as complete care. It is important that you follow up with your healthcare provider(s) for ongoing care. If your symptoms worsen or do not improve as expected, and you are unable to reach your usual health care provider(s), you should return to the Emergency Department. Today's healthcare is undergoing tremendous change, and patient satisfaction surveys are one of the many tools to assess the quality of medical care. You may receive a survey from the [employer] organization regarding your experience in the Emergency Department. I hope that your experience has been completely positive, particularly the medical care that I provided.   As such, please participate in the survey; anything less than excellent does not meet my expectations or intentions. [address] and [address] [place] [address] participate in nationally recognized quality of care measures. If your blood pressure is greater than 120/80, as reported below, we urge that you seek medical care to address the potential of high blood pressure, commonly known as hypertension. Hypertension can be hereditary or can be caused by certain medical conditions, pain, stress, or \"white coat syndrome. \"       Please make an appointment with your health care provider(s) for follow up of your Emergency Department visit. VITALS:   Patient Vitals for the past 8 hrs:   Temp Pulse Resp BP SpO2   04/19/17 1434 98.1 °F (36.7 °C) (!) 131 16 142/85 98 %          Thank you for allowing us to provide you with medical care today. We realize that you have many choices for your emergency care needs. Please choose us in the future for any continued health care needs.       Kirstie Gtz MD    [facility].   Office: [phone]            Recent Results (from the past 24 hour(s))   EKG, 12 LEAD, INITIAL    Collection Time: 04/19/17  3:14 PM   Result Value Ref Range    Ventricular Rate 116 BPM    Atrial Rate 116 BPM    P-R Interval 176 ms    QRS Duration 74 ms    Q-T Interval 314 ms    QTC Calculation (Bezet) 436 ms    Calculated P Axis 66 degrees    Calculated R Axis 174 degrees    Calculated T Axis 67 degrees    Diagnosis       Sinus tachycardia  Nonspecific ST and T wave abnormality  When compared with ECG of 02-FEB-2016 14:18,  Nonspecific T wave abnormality now evident in Inferior leads     CBC WITH AUTOMATED DIFF    Collection Time: 04/19/17  3:19 PM   Result Value Ref Range    WBC 11.3 (H) 3.6 - 11.0 K/uL    RBC 5.35 (H) 3.80 - 5.20 M/uL    HGB 16.4 (H) 11.5 - 16.0 g/dL    HCT 47.7 (H) 35.0 - 47.0 %    MCV 89.2 80.0 - 99.0 FL    MCH 30.7 26.0 - 34.0 PG MCHC 34.4 30.0 - 36.5 g/dL    RDW 12.4 11.5 - 14.5 %    PLATELET 228 424 - 412 K/uL    NEUTROPHILS 86 (H) 32 - 75 %    LYMPHOCYTES 10 (L) 12 - 49 %    MONOCYTES 4 (L) 5 - 13 %    EOSINOPHILS 0 0 - 7 %    BASOPHILS 0 0 - 1 %    ABS. NEUTROPHILS 9.7 (H) 1.8 - 8.0 K/UL    ABS. LYMPHOCYTES 1.1 0.8 - 3.5 K/UL    ABS. MONOCYTES 0.4 0.0 - 1.0 K/UL    ABS. EOSINOPHILS 0.0 0.0 - 0.4 K/UL    ABS. BASOPHILS 0.0 0.0 - 0.1 K/UL   METABOLIC PANEL, COMPREHENSIVE    Collection Time: 04/19/17  3:19 PM   Result Value Ref Range    Sodium 136 136 - 145 mmol/L    Potassium 4.0 3.5 - 5.1 mmol/L    Chloride 97 97 - 108 mmol/L    CO2 21 21 - 32 mmol/L    Anion gap 18 (H) 5 - 15 mmol/L    Glucose 174 (H) 65 - 100 mg/dL    BUN 19 6 - 20 MG/DL    Creatinine 1.03 (H) 0.55 - 1.02 MG/DL    BUN/Creatinine ratio 18 12 - 20      GFR est AA >60 >60 ml/min/1.73m2    GFR est non-AA 55 (L) >60 ml/min/1.73m2    Calcium 11.0 (H) 8.5 - 10.1 MG/DL    Bilirubin, total 1.7 (H) 0.2 - 1.0 MG/DL    ALT (SGPT) 36 12 - 78 U/L    AST (SGOT) 13 (L) 15 - 37 U/L    Alk.  phosphatase 88 45 - 117 U/L    Protein, total 8.0 6.4 - 8.2 g/dL    Albumin 4.4 3.5 - 5.0 g/dL    Globulin 3.6 2.0 - 4.0 g/dL    A-G Ratio 1.2 1.1 - 2.2     TSH 3RD GENERATION    Collection Time: 04/19/17  3:19 PM   Result Value Ref Range    TSH 0.54 0.36 - 3.74 uIU/mL   CK    Collection Time: 04/19/17  3:19 PM   Result Value Ref Range    CK 42 26 - 192 U/L   T4, FREE    Collection Time: 04/19/17  3:19 PM   Result Value Ref Range    T4, Free 1.3 0.8 - 1.5 NG/DL   URINALYSIS W/MICROSCOPIC    Collection Time: 04/19/17  3:20 PM   Result Value Ref Range    Color YELLOW/STRAW      Appearance CLEAR CLEAR      Specific gravity 1.020 1.003 - 1.030      pH (UA) 5.5 5.0 - 8.0      Protein NEGATIVE  NEG mg/dL    Glucose >1000 (A) NEG mg/dL    Ketone >80 (A) NEG mg/dL    Bilirubin NEGATIVE  NEG      Blood NEGATIVE  NEG      Urobilinogen 0.2 0.2 - 1.0 EU/dL    Nitrites NEGATIVE  NEG      Leukocyte Esterase NEGATIVE  NEG      WBC 0-4 0 - 4 /hpf    RBC 0-5 0 - 5 /hpf    Epithelial cells FEW FEW /lpf    Bacteria NEGATIVE  NEG /hpf    Hyaline cast 0-2 0 - 5 /lpf       Xr Chest Pa Lat    Result Date: 4/19/2017  EXAM:  XR CHEST PA LAT INDICATION:   Fatigue ongoing for 2 weeks with associated weight loss of 20 pounds in the past 2 months. COMPARISON: None. FINDINGS: PA and lateral radiographs of the chest demonstrate clear lungs. The cardiac and mediastinal contours and pulmonary vascularity are normal.  Chest wall structures and visualized upper abdomen are unremarkable apart from bilateral debbie and screw construct of the upper lumbar spine and shoulder visualization of gastric band in expected and stable position. IMPRESSION: No acute findings.

## 2017-04-19 NOTE — ED TRIAGE NOTES
Pt c/o feeling weak and tired x 2 weeks, seen by pcp and was told her sugar was out of whack and she had hyperthyroid, pt stated something is going on with her body and she is not going home until they find out what, +gagging , +neck pain, denies fever, denies diarrhea, denies constipation, denies urinary symptoms, pt stated she has been on an antibiotic since last Thursday for a UTI

## 2017-04-19 NOTE — ED PROVIDER NOTES
HPI Comments: 60 y.o. female with past medical history significant for depression, GERD, HTN, chronic back pain, lap band, and DM who presents accompanied by her mother with chief complaint of fatigue. The pt c/o fatigue that has been ongoing for 2 weeks. The pt says that she is sleeping a lot and has been less active than normal. The pt reports that her legs can feel heavy with NKI. The pt notes that she has lost about 20 lbs in the past 2 months. The pt says that she is always hungry and she is eating regularly. The pt indicates that her lap band \"feels funny\" and reports that she has been dry heaving without active vomiting. The pt says that she saw her PCP 6 days ago where her blood sugar was elevated and she had an A1C of 11.2. The pt reports that her PCP increased two of her medications for DM and started her on ABX for a UTI. The pt says that her PCP believes she may have a hyper active thyroid but she did not run tests. The pt also notes palpitations. The pt denies ever seeing an endocrinologist. There are no other acute medical concerns at this time. Social hx: Nonsmoker  PCP: Sonya Merida NP    Note written by Lucien Alvarez, as dictated by Terrie Acevedo MD 4:22 PM      The history is provided by the patient. No  was used.         Past Medical History:   Diagnosis Date    Arrhythmia     \"IRREG HEARTBEAT\"  (DR Ai Hung)    Arthritis     Chronic pain     BACK    Depression     Diabetes ([address]     Dyspepsia and other specified disorders of function of stomach     GERD (gastroesophageal reflux disease)     Headache     Headache     Hypertension     Hyperthyroidism     Morbid obesity ([address]     Psychiatric disorder     DEPRESSION    Sleep apnea     no CPAP    Stool color black     Sun-damaged skin     Tanning bed exposure        Past Surgical History:   Procedure Laterality Date    HAND/FINGER SURGERY UNLISTED      x3    HX BACK SURGERY      x4- 2001,2003,2009,2011    HX CHOLECYSTECTOMY  2002    HX GASTRIC BYPASS  5/2008    LAP BAND ( RESHMA Amaya)    HX HEENT  2006    LASIK BILAT    HX HYSTERECTOMY  2005    HX ORTHOPAEDIC  2006,2010    HANDS  RIGHT X2; LEFT X1    HX TONSILLECTOMY  1964    HX TUBAL LIGATION  5450,[id number],8563 REV    x2 and a reversal    HX UROLOGICAL  04/29/2015    urethra sling    LAP, PLACE ADJUST GASTR BAND  5/2008         Family History:   Problem Relation Age of Onset    Post-op Nausea/Vomiting Mother     Delayed Awakening Mother     Psychiatric Disorder Mother     Hypertension Mother     Stroke Mother     Headache Mother     Post-op Nausea/Vomiting Daughter     Delayed Awakening Daughter     Cancer Father     Hypertension Father     Cancer Maternal Grandmother     Psychiatric Disorder Maternal Grandmother     Dementia Paternal Grandmother     Dementia Paternal Grandfather     Parkinson's Disease Paternal Grandfather        Social History     Social History    Marital status:      Spouse name: N/A    Number of children: N/A    Years of education: N/A     Occupational History    Not on file. Social History Main Topics    Smoking status: Never Smoker    Smokeless tobacco: Never Used    Alcohol use No    Drug use: No    Sexual activity: Yes     Partners: Male     Other Topics Concern    Not on file     Social History Narrative         ALLERGIES: Review of patient's allergies indicates no known allergies. Review of Systems   Constitutional: Positive for activity change (decrease), appetite change (increase), fatigue and unexpected weight change (loss). Negative for chills and fever. HENT: Negative for rhinorrhea, sore throat and trouble swallowing. Eyes: Negative for photophobia. Respiratory: Negative for cough and shortness of breath. Cardiovascular: Positive for palpitations. Negative for chest pain. Gastrointestinal: Negative for abdominal pain, nausea and vomiting. Genitourinary: Negative for dysuria, frequency and hematuria. Musculoskeletal: Negative for arthralgias. Neurological: Negative for dizziness, syncope and weakness. Psychiatric/Behavioral: Negative for behavioral problems. The patient is not nervous/anxious. All other systems reviewed and are negative. Vitals:    04/19/17 1434   BP: 142/85   Pulse: (!) 131   Resp: 16   Temp: 98.1 °F (36.7 °C)   SpO2: 98%   Weight: 60 kg (132 lb 6 oz)   Height: 5' 4\" (1.626 m)            Physical Exam   Constitutional: She appears well-developed and well-nourished. HENT:   Head: Normocephalic and atraumatic. Mouth/Throat: Oropharynx is clear and moist.   Eyes: EOM are normal. Pupils are equal, round, and reactive to light. Neck: Normal range of motion. Neck supple. Cardiovascular: Regular rhythm, normal heart sounds and intact distal pulses. Tachycardia present. Exam reveals no gallop and no friction rub. No murmur heard. Pulse rate 130 bpm.   Pulmonary/Chest: Effort normal. No respiratory distress. She has no wheezes. She has no rales. Abdominal: Soft. There is no tenderness. There is no rebound. Musculoskeletal: Normal range of motion. She exhibits no tenderness. Neurological: She is alert. No cranial nerve deficit. Motor; symmetric   Skin: No erythema. Psychiatric: Her behavior is normal. She exhibits a depressed mood. Nursing note and vitals reviewed.   Note written by Lucien Mata, as dictated by Zulma Odonnell MD 4:15 PM     [facility]  ED Course       Procedures

## 2017-04-20 LAB
ATRIAL RATE: 116 BPM
CALCULATED P AXIS, ECG09: 66 DEGREES
CALCULATED R AXIS, ECG10: 174 DEGREES
CALCULATED T AXIS, ECG11: 67 DEGREES
DIAGNOSIS, 93000: NORMAL
P-R INTERVAL, ECG05: 176 MS
Q-T INTERVAL, ECG07: 314 MS
QRS DURATION, ECG06: 74 MS
QTC CALCULATION (BEZET), ECG08: 436 MS
VENTRICULAR RATE, ECG03: 116 BPM

## 2017-04-26 ENCOUNTER — OFFICE VISIT (OUTPATIENT)
Dept: FAMILY MEDICINE CLINIC | Age: 57
End: 2017-04-26

## 2017-04-26 VITALS
HEART RATE: 86 BPM | RESPIRATION RATE: 16 BRPM | HEIGHT: 64 IN | SYSTOLIC BLOOD PRESSURE: 125 MMHG | DIASTOLIC BLOOD PRESSURE: 93 MMHG | OXYGEN SATURATION: 97 % | WEIGHT: 131 LBS | TEMPERATURE: 99 F | BODY MASS INDEX: 22.36 KG/M2

## 2017-04-26 DIAGNOSIS — R63.4 WEIGHT LOSS: ICD-10-CM

## 2017-04-26 DIAGNOSIS — E11.9 CONTROLLED TYPE 2 DIABETES MELLITUS WITHOUT COMPLICATION, WITHOUT LONG-TERM CURRENT USE OF INSULIN (HCC): Primary | ICD-10-CM

## 2017-04-26 DIAGNOSIS — R10.9 RIGHT LATERAL ABDOMINAL PAIN: ICD-10-CM

## 2017-04-26 DIAGNOSIS — R11.0 NAUSEA: ICD-10-CM

## 2017-04-26 RX ORDER — PEN NEEDLE, DIABETIC 30 GX3/16"
NEEDLE, DISPOSABLE MISCELLANEOUS
Qty: 15 PACKAGE | Refills: 3 | Status: SHIPPED | OUTPATIENT
Start: 2017-04-26 | End: 2018-06-14 | Stop reason: SDUPTHER

## 2017-04-26 NOTE — PROGRESS NOTES
1. Have you been to the ER, urgent care clinic since your last visit? Hospitalized since your last visit? YES, [facility], 4-19-17    2. Have you seen or consulted any other health care providers outside of the [address] [place] [address] since your last visit? Include any pap smears or colon screening.  No     Chief Complaint   Patient presents with    Nausea     x 4weeks    Dizziness     x 4weeks    Hair/Scalp Problem     Hair loss x4 months    Follow-up     [facility] 4-19-17

## 2017-04-26 NOTE — MR AVS SNAPSHOT
Visit Information Date & Time Provider Department Dept. Phone Encounter #  
 4/26/2017  8:30 AM Diane Salvador, NAYELI [address] [facility] [phone] [id number] Upcoming Health Maintenance Date Due Pneumococcal 19-64 Medium Risk (1 of 1 - PPSV23) 6/30/1977 DTaP/Tdap/Td series (1 - Tdap) 6/30/1979 EYE EXAM RETINAL OR DILATED Q1 8/18/2016 HEMOGLOBIN A1C Q6M 10/13/2017 FOOT EXAM Q1 11/28/2017 MICROALBUMIN Q1 11/28/2017 LIPID PANEL Q1 11/28/2017 COLONOSCOPY 1/1/2018 BREAST CANCER SCRN MAMMOGRAM 12/29/2018 Allergies as of 4/26/2017  Review Complete On: 4/26/2017 By: Fabiola Moore LPN No Known Allergies Current Immunizations  Reviewed on 11/23/2015 Name Date Influenza Vaccine 10/29/2013 Influenza Vaccine Roylene Moan) 12/5/2014 Influenza Vaccine (Quad) PF 11/28/2016, 11/23/2015 Influenza Vaccine Whole 10/1/2011 Not reviewed this visit You Were Diagnosed With   
  
 Codes Comments Controlled type 2 diabetes mellitus without complication, without long-term current use of insulin ([facility]    -  Primary ICD-10-CM: E11.9 ICD-9-CM: 250.00 Right lateral abdominal pain     ICD-10-CM: R10.9 ICD-9-CM: 789.09 Weight loss     ICD-10-CM: R63.4 ICD-9-CM: 783.21 Nausea     ICD-10-CM: R11.0 ICD-9-CM: 787.02 Vitals BP Pulse Temp Resp Height(growth percentile) Weight(growth percentile) (!) 125/93 (BP 1 Location: Right arm, BP Patient Position: Sitting) 86 99 °F (37.2 °C) (Oral) 16 5' 4\" (1.626 m) 131 lb (59.4 kg) LMP SpO2 BMI OB Status Smoking Status (LMP Unknown) 97% 22.49 kg/m2 Hysterectomy Never Smoker Vitals History BMI and BSA Data Body Mass Index Body Surface Area  
 22.49 kg/m 2 1.64 m 2 Preferred Pharmacy Pharmacy Name Phone [address] - [address] [place] [address] [address] [phone] Your Updated Medication List  
  
   
 This list is accurate as of: 4/26/17  9:10 AM.  Always use your most recent med list.  
  
  
  
  
 cholecalciferol (vitamin D3) 2,000 unit Tab Take  by mouth. DULoxetine 30 mg capsule Commonly known as:  CYMBALTA Take 1 Cap by mouth daily. gabapentin 300 mg capsule Commonly known as:  NEURONTIN  
TAKE 1 CAPSULE THREE TIMES DAILY  
  
 hydroCHLOROthiazide 12.5 mg tablet Commonly known as:  HYDRODIURIL  
TAKE 1 TABLET EVERY DAY  
  
 insulin detemir 100 unit/mL (3 mL) Inpn Commonly known as:  LEVEMIR FLEXTOUCH  
40 units at bedtime dx: E11.9  
  
 lidocaine 2 % jelly Commonly known as:  XYLOCAINE  
APPLY A THIN FILM TO AFFECTED AREA ONLY(LOW BACK AND NECK ONLY) ONE TIME DAILY AS NEEDED LORazepam 0.5 mg tablet Commonly known as:  ATIVAN  
TAKE 1 TABLET BY MOUTH TWICE DAILY AS NEEDED FOR ANXIETY- MUST LAST 30 DAYS  
  
 metFORMIN  mg tablet Commonly known as:  GLUCOPHAGE XR  
TAKE 2 TABLETS DAILY WITH DINNER  
  
 mometasone 0.1 % topical cream  
Commonly known as:  ELOCON  
APPLY TO THE AFFECTED AREA TWICE A DAY AS NEEDED FOR ALLERGIC RASH  
  
 montelukast 10 mg tablet Commonly known as:  SINGULAIR  
TAKE 1 TABLET EVERY DAY  
  
 mupirocin 2 % ointment Commonly known as:  [employer] Apply  to affected area two (2) times a day. As needed for skin infection  
  
 rosuvastatin 5 mg tablet Commonly known as:  CRESTOR  
TAKE 1 TABLET EVERY NIGHT  
  
 SUMAtriptan 100 mg tablet Commonly known as:  IMITREX Take 1 Tab by mouth once as needed for Migraine for up to 1 dose. topiramate 25 mg tablet Commonly known as:  TOPAMAX 2 po qhs  
  
 traMADol 50 mg tablet Commonly known as:  ULTRAM  
Take 1 Tab by mouth two (2) times daily as needed for Pain. - must last 30 days  
  
 undecylenic acid-chloroxylenol 25-3 % Soln  
by Apply Externally route. Prescriptions Sent to Pharmacy Refills insulin detemir (LEVEMIR FLEXTOUCH) 100 unit/mL (3 mL) inpn 3 Si units at bedtime dx: E11.9 Class: Normal  
 Pharmacy: [address] [place] [address], [address] [place] [address] #: [phone] To-Do List   
 2017 Imaging:  CT ABD W WO CONT Referral Information Referral ID Referred By Referred To  
  
 [id number] Bruna Headley N Not Available Visits Status Start Date End Date 1 New Request 17 If your referral has a status of pending review or denied, additional information will be sent to support the outcome of this decision. Introducing hospitals & HEALTH SERVICES! Dear Momo Sit: 
Thank you for requesting a [employer] account. Our records indicate that you already have an active [employer]cal[employer] account. You can access your account anytime at [email or web address] Did you know that you can access your hospital and ER discharge instructions at any time in Terascala? You can also review all of your test results from your hospital stay or ER visit. Additional Information If you have questions, please visit the Frequently Asked Questions section of the T[employer] website at [email or web address] Remember, Terascala is NOT to be used for urgent needs. For medical emergencies, dial 911. Now available from your iPhone and Android! Please provide this summary of care documentation to your next provider. Your primary care clinician is listed as Subhash Diaz. If you have any questions after today's visit, please call [phone].

## 2017-04-26 NOTE — PROGRESS NOTES
HISTORY OF PRESENT ILLNESS  Melvin Miller is a 60 y.o. female. HPI  Nausea    x 4weeks - stefano in the am and has to take zofran to be able to eat  Craving bbq and slaw and eats this daily; breakfast is oatmeal usually   Dizziness    x 4weeks   Hair/Scalp Problem    Hair loss x4 months   Follow-up    [facility] 4-19-17 - went to ER for abd pain; no acute process found  Given IV fluids and d/cd, told to see pcp     She is diabetic and new meter shows most all readings in the 300-400 range  She is taking her invokana and metformin as directed  Has never been on insulin    ROS  A comprehensive review of system was obtained and negative except findings in the HPI    Visit Vitals    BP (!) 125/93 (BP 1 Location: Right arm, BP Patient Position: Sitting)    Pulse 86    Temp 99 °F (37.2 °C) (Oral)    Resp 16    Ht 5' 4\" (1.626 m)    Wt 131 lb (59.4 kg)    LMP  (LMP Unknown)    SpO2 97%    BMI 22.49 kg/m2     Physical Exam   Constitutional: She is oriented to person, place, and time. She appears well-developed and well-nourished. Neck: No JVD present. Cardiovascular: Normal rate, regular rhythm and intact distal pulses. Exam reveals no gallop and no friction rub. No murmur heard. Pulmonary/Chest: Effort normal and breath sounds normal. No respiratory distress. She has no wheezes. Musculoskeletal: She exhibits no edema. Neurological: She is alert and oriented to person, place, and time. Skin: Skin is warm. Nursing note and vitals reviewed. ASSESSMENT and PLAN  Encounter Diagnoses   Name Primary?     Controlled type 2 diabetes mellitus without complication, without long-term current use of insulin (HCC) Yes    Right lateral abdominal pain     Weight loss     Nausea      Orders Placed This Encounter    CT ABD W WO CONT    insulin detemir (LEVEMIR FLEXTOUCH) 100 unit/mL (3 mL) inpn    Insulin Needles, Disposable, 31 gauge x 5/16\" ndle     Weight loss and nausea is likely to be high sugar  D/c Invokana and start Levemir 10u a day, increase by 2 units every day until sugars are in the 140 range  Will send sugar log via E-Semblet every 2 days  First injection in the office with 2 units to demo pen done  rx sent to [facility] for insulin pens and needles    I have discussed the diagnosis with the patient and the intended plan as seen in the above orders. The patient has received an after-visit summary and questions were answered concerning future plans. Patient conveyed understanding of the plan at the time of the visit.     Judy Silver, MSN, ANP  4/26/2017

## 2017-05-03 ENCOUNTER — TELEPHONE (OUTPATIENT)
Dept: FAMILY MEDICINE CLINIC | Age: 57
End: 2017-05-03

## 2017-05-05 NOTE — TELEPHONE ENCOUNTER
----- Message from Alen Palacios sent at 5/5/2017  2:12 PM EDT -----  Regarding: MARK Santos/Telephone  Pt called requesting a call back on a message of the  CT scan results. Pt best contact number is [phone].

## 2017-05-17 DIAGNOSIS — R11.0 NAUSEA: ICD-10-CM

## 2017-05-17 DIAGNOSIS — R10.9 RIGHT LATERAL ABDOMINAL PAIN: ICD-10-CM

## 2017-05-17 DIAGNOSIS — R63.4 WEIGHT LOSS: ICD-10-CM

## 2017-05-23 DIAGNOSIS — M51.36 DDD (DEGENERATIVE DISC DISEASE), LUMBAR: ICD-10-CM

## 2017-05-23 RX ORDER — TRAMADOL HYDROCHLORIDE 50 MG/1
50 TABLET ORAL
Qty: 60 TAB | Refills: 0 | OUTPATIENT
Start: 2017-05-23 | End: 2017-07-02 | Stop reason: SDUPTHER

## 2017-06-09 ENCOUNTER — OFFICE VISIT (OUTPATIENT)
Dept: FAMILY MEDICINE CLINIC | Age: 57
End: 2017-06-09

## 2017-06-09 VITALS
HEART RATE: 78 BPM | SYSTOLIC BLOOD PRESSURE: 110 MMHG | HEIGHT: 64 IN | DIASTOLIC BLOOD PRESSURE: 78 MMHG | OXYGEN SATURATION: 100 % | WEIGHT: 147 LBS | RESPIRATION RATE: 12 BRPM | BODY MASS INDEX: 25.1 KG/M2

## 2017-06-09 DIAGNOSIS — E11.9 CONTROLLED TYPE 2 DIABETES MELLITUS WITHOUT COMPLICATION, WITHOUT LONG-TERM CURRENT USE OF INSULIN (HCC): Primary | ICD-10-CM

## 2017-06-09 DIAGNOSIS — E78.6 HYPOCHOLESTEREMIA: ICD-10-CM

## 2017-06-09 NOTE — MR AVS SNAPSHOT
Visit Information Date & Time Provider Department Dept. Phone Encounter #  
 6/9/2017  1:45 PM Sally Lamar, [address] [phone] [id number] Upcoming Health Maintenance Date Due Pneumococcal 19-64 Medium Risk (1 of 1 - PPSV23) 6/30/1977 DTaP/Tdap/Td series (1 - Tdap) 6/30/1979 EYE EXAM RETINAL OR DILATED Q1 8/18/2016 INFLUENZA AGE 9 TO ADULT 8/1/2017 HEMOGLOBIN A1C Q6M 10/13/2017 FOOT EXAM Q1 11/28/2017 MICROALBUMIN Q1 11/28/2017 LIPID PANEL Q1 11/28/2017 COLONOSCOPY 1/1/2018 BREAST CANCER SCRN MAMMOGRAM 12/29/2018 Allergies as of 6/9/2017  Review Complete On: 6/9/2017 By: Eveline Mccracken LPN No Known Allergies Current Immunizations  Reviewed on 11/23/2015 Name Date Influenza Vaccine 10/29/2013 Influenza Vaccine Geofm Rave) 12/5/2014 Influenza Vaccine (Quad) PF 11/28/2016, 11/23/2015 Influenza Vaccine Whole 10/1/2011 Not reviewed this visit You Were Diagnosed With   
  
 Codes Comments Controlled type 2 diabetes mellitus without complication, without long-term current use of insulin ([facility]    -  Primary ICD-10-CM: E11.9 ICD-9-CM: 250.00 Hypocholesteremia     ICD-10-CM: E78.6 ICD-9-CM: 272.5 Vitals BP Pulse Resp Height(growth percentile) Weight(growth percentile) LMP  
 110/78 78 12 5' 4\" (1.626 m) 147 lb (66.7 kg) (LMP Unknown) SpO2 BMI OB Status Smoking Status 100% 25.23 kg/m2 Hysterectomy Never Smoker Vitals History BMI and BSA Data Body Mass Index Body Surface Area  
 25.23 kg/m 2 1.74 m 2 Preferred Pharmacy Pharmacy Name Phone [address] [facility], [facility] [address] [facility] OF [address] [place] [address] ([phone] Your Updated Medication List  
  
   
This list is accurate as of: 6/9/17  2:13 PM.  Always use your most recent med list.  
  
  
  
  
 cholecalciferol (vitamin D3) 2,000 unit Tab Take  by mouth. DULoxetine 30 mg capsule Commonly known as:  CYMBALTA Take 1 Cap by mouth daily. gabapentin 300 mg capsule Commonly known as:  NEURONTIN  
TAKE 1 CAPSULE THREE TIMES DAILY  
  
 hydroCHLOROthiazide 12.5 mg tablet Commonly known as:  HYDRODIURIL  
TAKE 1 TABLET EVERY DAY  
  
 insulin detemir 100 unit/mL (3 mL) Inpn Commonly known as:  LEVEMIR FLEXTOUCH  
40 units at bedtime dx: E11.9 Insulin Needles (Disposable) 31 gauge x 5/16\" Ndle Once a day injection with Levemir INVOKANA 300 mg tablet Generic drug:  canagliflozin Take 1 Tab by mouth Daily (before breakfast). lidocaine 2 % jelly Commonly known as:  XYLOCAINE  
APPLY A THIN FILM TO AFFECTED AREA ONLY(LOW BACK AND NECK ONLY) ONE TIME DAILY AS NEEDED LORazepam 0.5 mg tablet Commonly known as:  ATIVAN  
TAKE 1 TABLET BY MOUTH TWICE DAILY AS NEEDED FOR ANXIETY- MUST LAST 30 DAYS  
  
 metFORMIN  mg tablet Commonly known as:  GLUCOPHAGE XR  
TAKE 2 TABLETS DAILY WITH DINNER  
  
 mometasone 0.1 % topical cream  
Commonly known as:  ELOCON  
APPLY TO THE AFFECTED AREA TWICE A DAY AS NEEDED FOR ALLERGIC RASH  
  
 montelukast 10 mg tablet Commonly known as:  SINGULAIR  
TAKE 1 TABLET EVERY DAY  
  
 mupirocin 2 % ointment Commonly known as:  [employer] Apply  to affected area two (2) times a day. As needed for skin infection  
  
 rosuvastatin 5 mg tablet Commonly known as:  CRESTOR  
TAKE 1 TABLET EVERY NIGHT  
  
 SUMAtriptan 100 mg tablet Commonly known as:  IMITREX Take 1 Tab by mouth once as needed for Migraine for up to 1 dose. topiramate 25 mg tablet Commonly known as:  TOPAMAX 2 po qhs  
  
 traMADol 50 mg tablet Commonly known as:  ULTRAM  
Take 1 Tab by mouth two (2) times daily as needed for Pain. - must last 30 days  
  
 undecylenic acid-chloroxylenol 25-3 % Soln  
by Apply Externally route. We Performed the Following HEMOGLOBIN A1C WITH EAG [08442 CPT(R)] LIPID PANEL [74831 CPT(R)] Introducing [facility] & HEALTH SERVICES! Dear Anastasiya Louise: 
Thank you for requesting a [employer]kf-ph[employer] account. Our records indicate that you already have an active [employer]-ph[employer] account. You can access your account anytime at [email or web address] Did you know that you can access your hospital and ER discharge instructions at any time in nkf-pharma? You can also review all of your test results from your hospital stay or ER visit. Additional Information If you have questions, please visit the Frequently Asked Questions section of the [employer]kf-ph[employer] website at [email or web address] Remember, nkf-pharma is NOT to be used for urgent needs. For medical emergencies, dial 911. Now available from your iPhone and Android! Please provide this summary of care documentation to your next provider. Your primary care clinician is listed as Les Pedraza. If you have any questions after today's visit, please call [phone].

## 2017-06-10 LAB
CHOLEST SERPL-MCNC: 111 MG/DL (ref 100–199)
EST. AVERAGE GLUCOSE BLD GHB EST-MCNC: 206 MG/DL
HBA1C MFR BLD: 8.8 % (ref 4.8–5.6)
HDLC SERPL-MCNC: 61 MG/DL
INTERPRETATION, 910389: NORMAL
LDLC SERPL CALC-MCNC: 37 MG/DL (ref 0–99)
Lab: NORMAL
TRIGL SERPL-MCNC: 63 MG/DL (ref 0–149)
VLDLC SERPL CALC-MCNC: 13 MG/DL (ref 5–40)

## 2017-06-12 NOTE — PROGRESS NOTES
Hey there, your cholesterol is amazing and your A1C is better in the last month.  Let me know who you want these results sent to, Michael Brandt

## 2017-06-14 ENCOUNTER — DOCUMENTATION ONLY (OUTPATIENT)
Dept: FAMILY MEDICINE CLINIC | Age: 57
End: 2017-06-14

## 2017-06-16 NOTE — PROGRESS NOTES
HISTORY OF PRESENT ILLNESS  Shakir Nevarez is a 60 y.o. female. HPI  Patient here for lab update  Started insulin last visit and sugar has been much improved however wants another chance with Invokana. 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 She appears well-developed and well-nourished. Neck: No JVD present. Cardiovascular: Normal rate, regular rhythm and intact distal pulses. Exam reveals no gallop and no friction rub. No murmur heard. Pulmonary/Chest: Effort normal and breath sounds normal. No respiratory distress. She has no wheezes. Musculoskeletal: She exhibits no edema. Neurological: She is alert and oriented to person, place, and time. Skin: Skin is warm. Nursing note and vitals reviewed. ASSESSMENT and PLAN  Encounter Diagnoses   Name Primary?  Controlled type 2 diabetes mellitus without complication, without long-term current use of insulin (HCC) Yes    Hypocholesteremia      Orders Placed This Encounter    LIPID PANEL    HEMOGLOBIN A1C WITH EAG    CVD REPORT    DIABETES PATIENT EDUCATION    canagliflozin (INVOKANA) 300 mg tablet     Labs updated today  Given Invokana daily  Reviewed how to use and what to expect    I have discussed the diagnosis with the patient and the intended plan as seen in the above orders. The patient has received an after-visit summary and questions were answered concerning future plans. Patient conveyed understanding of the plan at the time of the visit.     Darryn Reynolds, MSN, ANP  6/15/2017

## 2017-07-02 DIAGNOSIS — M51.36 DDD (DEGENERATIVE DISC DISEASE), LUMBAR: ICD-10-CM

## 2017-07-02 RX ORDER — TRAMADOL HYDROCHLORIDE 50 MG/1
50 TABLET ORAL
Qty: 60 TAB | Refills: 0 | OUTPATIENT
Start: 2017-07-02 | End: 2017-08-02 | Stop reason: SDUPTHER

## 2017-07-06 RX ORDER — LIDOCAINE HYDROCHLORIDE 20 MG/ML
JELLY TOPICAL
Qty: 30 ML | Refills: 1 | Status: SHIPPED | OUTPATIENT
Start: 2017-07-06 | End: 2018-11-13 | Stop reason: SDUPTHER

## 2017-07-06 RX ORDER — CANAGLIFLOZIN 100 MG/1
TABLET, FILM COATED ORAL
Qty: 90 TAB | Refills: 1 | Status: SHIPPED | OUTPATIENT
Start: 2017-07-06 | End: 2017-11-20 | Stop reason: SDUPTHER

## 2017-07-13 RX ORDER — TOPIRAMATE 25 MG/1
TABLET ORAL
Qty: 60 TAB | Refills: 0 | Status: SHIPPED | OUTPATIENT
Start: 2017-07-13 | End: 2017-12-13 | Stop reason: ALTCHOICE

## 2017-08-02 DIAGNOSIS — M51.36 DDD (DEGENERATIVE DISC DISEASE), LUMBAR: ICD-10-CM

## 2017-08-02 RX ORDER — TRAMADOL HYDROCHLORIDE 50 MG/1
TABLET ORAL
Qty: 60 TAB | Refills: 0 | OUTPATIENT
Start: 2017-08-02 | End: 2017-09-08 | Stop reason: SDUPTHER

## 2017-08-29 RX ORDER — LORAZEPAM 0.5 MG/1
TABLET ORAL
Qty: 60 TAB | Refills: 0 | OUTPATIENT
Start: 2017-08-29 | End: 2017-10-26 | Stop reason: SDUPTHER

## 2017-09-08 ENCOUNTER — OFFICE VISIT (OUTPATIENT)
Dept: FAMILY MEDICINE CLINIC | Age: 57
End: 2017-09-08

## 2017-09-08 VITALS
WEIGHT: 153.38 LBS | HEART RATE: 79 BPM | BODY MASS INDEX: 26.18 KG/M2 | RESPIRATION RATE: 16 BRPM | SYSTOLIC BLOOD PRESSURE: 110 MMHG | OXYGEN SATURATION: 98 % | TEMPERATURE: 98 F | DIASTOLIC BLOOD PRESSURE: 72 MMHG | HEIGHT: 64 IN

## 2017-09-08 DIAGNOSIS — M51.36 DDD (DEGENERATIVE DISC DISEASE), LUMBAR: ICD-10-CM

## 2017-09-08 DIAGNOSIS — E11.9 CONTROLLED TYPE 2 DIABETES MELLITUS WITHOUT COMPLICATION, WITH LONG-TERM CURRENT USE OF INSULIN (HCC): Primary | ICD-10-CM

## 2017-09-08 DIAGNOSIS — E78.00 HYPERCHOLESTEREMIA: ICD-10-CM

## 2017-09-08 DIAGNOSIS — Z79.4 CONTROLLED TYPE 2 DIABETES MELLITUS WITHOUT COMPLICATION, WITH LONG-TERM CURRENT USE OF INSULIN (HCC): Primary | ICD-10-CM

## 2017-09-08 RX ORDER — TRAMADOL HYDROCHLORIDE 50 MG/1
TABLET ORAL
Qty: 60 TAB | Refills: 2 | Status: SHIPPED | OUTPATIENT
Start: 2017-09-08 | End: 2017-12-13 | Stop reason: SDUPTHER

## 2017-09-08 NOTE — PROGRESS NOTES
HISTORY OF PRESENT ILLNESS  Dolores Graf is a 59 y.o. female. HPI  Cardiovascular Review:  The patient has hyperlipidemia. Diet and Lifestyle: generally follows a low fat low cholesterol diet, generally follows a low sodium diet, exercises sporadically, nonsmoker  Home BP Monitoring: is not measured at home. Pertinent ROS: taking medications as instructed, no medication side effects noted, no TIA's, no chest pain on exertion, no dyspnea on exertion, no swelling of ankles. Diabetes Mellitus:  She has diabetes mellitus, and  hyperlipidemia. Diabetic ROS - medication compliance: compliant all of the time, diabetic diet compliance: compliant all of the time, home glucose monitoring: fasting values range 120 or less, greater than 150 in evening but knows it is diet related. Lab review: orders written for new lab studies as appropriate; see orders.    DDD lumbar spine:  Needs refill of tramadol, no adr/se of med  Takes scheduled bid      Patient Active Problem List    Diagnosis Date Noted    Advanced care planning/counseling discussion 11/23/2015    Urinary frequency 10/05/2015    Diabetes mellitus type 2, controlled ([facility] [address] 04/17/2015    NICOLE (obstructive sleep apnea) 11/05/2013    Diverticulosis 10/29/2013    DDD (degenerative disc disease), lumbar 07/30/2013    Depression 07/30/2013    GERD (gastroesophageal reflux disease) 07/02/2013    Dysphagia 07/02/2013    Status post gastric banding 07/02/2013    Lumbar stenosis 11/07/2011    Morbid obesity ([facility] [address] 07/19/2011     Current Outpatient Prescriptions   Medication Sig Dispense Refill    traMADol (ULTRAM) 50 mg tablet TAKE 1 TABLET BY MOUTH TWICE DAILY AS NEEDED FOR PAIN - must last 30 days 60 Tab 2    LORazepam (ATIVAN) 0.5 mg tablet TAKE 1 TABLET BY MOUTH TWICE DAILY AS NEEDED FOR ANXIETY 60 Tab 0    topiramate (TOPAMAX) 25 mg tablet TAKE 2 TABLETS BY MOUTH EVERY NIGHT AT BEDTIME 60 Tab 0    INVOKANA 100 mg tablet TAKE 1 TABLET DAILY BEFORE BREAKFAST. 90 Tab 1    lidocaine (XYLOCAINE) 2 % jelly APPLY A THIN FILM TO AFFECTED AREA ONLY(LOW BACK AND NECK ONLY) ONE TIME DAILY AS NEEDED 30 mL 1    insulin detemir (LEVEMIR FLEXTOUCH) 100 unit/mL (3 mL) inpn 40 units at bedtime dx: E11.9 1 Pen 0    Insulin Needles, Disposable, 31 gauge x 5/16\" ndle Once a day injection with Levemir 15 Package 3    mometasone (ELOCON) 0.1 % topical cream APPLY TO THE AFFECTED AREA TWICE A DAY AS NEEDED FOR ALLERGIC RASH 15 g 0    gabapentin (NEURONTIN) 300 mg capsule TAKE 1 CAPSULE THREE TIMES DAILY 270 Cap 3    rosuvastatin (CRESTOR) 5 mg tablet TAKE 1 TABLET EVERY NIGHT 90 Tab 3    montelukast (SINGULAIR) 10 mg tablet TAKE 1 TABLET EVERY DAY 90 Tab 3    hydroCHLOROthiazide (HYDRODIURIL) 12.5 mg tablet TAKE 1 TABLET EVERY DAY 90 Tab 3    metFORMIN ER (GLUCOPHAGE XR) 500 mg tablet TAKE 2 TABLETS DAILY WITH DINNER 180 Tab 3    DULoxetine (CYMBALTA) 30 mg capsule Take 1 Cap by mouth daily. 90 Cap 3    SUMAtriptan (IMITREX) 100 mg tablet Take 1 Tab by mouth once as needed for Migraine for up to 1 dose. 12 Tab 6    undecylenic acid-chloroxylenol 25-3 % soln by Apply Externally route.  cholecalciferol, vitamin D3, 2,000 unit tab Take  by mouth.  mupirocin (BACTROBAN) 2 % ointment Apply  to affected area two (2) times a day.  As needed for skin infection 22 g 0     Family History   Problem Relation Age of Onset    Post-op Nausea/Vomiting Mother     Delayed Awakening Mother     Psychiatric Disorder Mother     Hypertension Mother     Stroke Mother     Headache Mother     Post-op Nausea/Vomiting Daughter     Delayed Awakening Daughter     Cancer Father     Hypertension Father     Cancer Maternal Grandmother     Psychiatric Disorder Maternal Grandmother     Dementia Paternal Grandmother     Dementia Paternal Grandfather     Parkinson's Disease Paternal Grandfather      Social History   Substance Use Topics    Smoking status: Never Smoker    Smokeless tobacco: Never Used    Alcohol use No       ROS  A comprehensive review of system was obtained and negative except findings in the HPI    Visit Vitals    /72    Pulse 79    Temp 98 °F (36.7 °C) (Oral)    Resp 16    Ht 5' 4\" (1.626 m)    Wt 153 lb 6 oz (69.6 kg)    LMP  (LMP Unknown)    SpO2 98%    BMI 26.33 kg/m2     Physical Exam   Constitutional: She is oriented to person, place, and time. She appears well-developed and well-nourished. Neck: No JVD present. Cardiovascular: Normal rate, regular rhythm and intact distal pulses. Exam reveals no gallop and no friction rub. No murmur heard. Pulmonary/Chest: Effort normal and breath sounds normal. No respiratory distress. She has no wheezes. Musculoskeletal: She exhibits no edema. Neurological: She is alert and oriented to person, place, and time. Skin: Skin is warm. Nursing note and vitals reviewed. ASSESSMENT and PLAN  Encounter Diagnoses   Name Primary?  Controlled type 2 diabetes mellitus without complication, with long-term current use of insulin (HCC) Yes    Hypercholesteremia     DDD (degenerative disc disease), lumbar      Orders Placed This Encounter    LIPID PANEL    HEMOGLOBIN A1C WITH EAG    MICROALBUMIN, UR, RAND W/ MICROALBUMIN/CREA RATIO    traMADol (ULTRAM) 50 mg tablet     Labs and refills updated today  Recheck 3 mo pending results  I have discussed the diagnosis with the patient and the intended plan as seen in the above orders. The patient has received an after-visit summary and questions were answered concerning future plans. Patient conveyed understanding of the plan at the time of the visit.     Dejah Russell, MSN, ANP  9/8/2017

## 2017-09-08 NOTE — PROGRESS NOTES
1. Have you been to the ER, urgent care clinic since your last visit? Hospitalized since your last visit? No    2. Have you seen or consulted any other health care providers outside of the [address] [place] [address] since your last visit? Include any pap smears or colon screening.  No    Chief Complaint   Patient presents with    Follow-up     3 mo f/u, DM, med ck    Labs     fasting

## 2017-09-08 NOTE — MR AVS SNAPSHOT
Visit Information Date & Time Provider Department Dept. Phone Encounter #  
 9/8/2017  3:30 PM Abraham Yousif, Elinor Villavicencio [phone] [id number] Upcoming Health Maintenance Date Due Pneumococcal 19-64 Medium Risk (1 of 1 - PPSV23) 6/30/1977 DTaP/Tdap/Td series (1 - Tdap) 6/30/1979 EYE EXAM RETINAL OR DILATED Q1 8/18/2016 INFLUENZA AGE 9 TO ADULT 8/1/2017 COLONOSCOPY 1/1/2018 FOOT EXAM Q1 11/28/2017 MICROALBUMIN Q1 11/28/2017 HEMOGLOBIN A1C Q6M 12/9/2017 LIPID PANEL Q1 6/9/2018 BREAST CANCER SCRN MAMMOGRAM 12/29/2018 Allergies as of 9/8/2017  Review Complete On: 9/8/2017 By: Abraham Yousif NP No Known Allergies Current Immunizations  Reviewed on 11/23/2015 Name Date Influenza Vaccine 10/29/2013 Influenza Vaccine Yael Orourke) 12/5/2014 Influenza Vaccine (Quad) PF 11/28/2016, 11/23/2015 Influenza Vaccine Whole 10/1/2011 Not reviewed this visit You Were Diagnosed With   
  
 Codes Comments Controlled type 2 diabetes mellitus without complication, with long-term current use of insulin (HCC)    -  Primary ICD-10-CM: E11.9, Z79.4 ICD-9-CM: 250.00, V58.67 Hypercholesteremia     ICD-10-CM: E78.00 ICD-9-CM: 272.0   
 DDD (degenerative disc disease), lumbar     ICD-10-CM: M51.36 
ICD-9-CM: 722.52 Vitals BP Pulse Temp Resp Height(growth percentile) Weight(growth percentile) 110/72 79 98 °F (36.7 °C) (Oral) 16 5' 4\" (1.626 m) 153 lb 6 oz (69.6 kg) LMP SpO2 BMI OB Status Smoking Status (LMP Unknown) 98% 26.33 kg/m2 Hysterectomy Never Smoker Vitals History BMI and BSA Data Body Mass Index Body Surface Area  
 26.33 kg/m 2 1.77 m 2 Preferred Pharmacy Pharmacy Name Phone 310 [facility], [facility] [address] ([address] [phone] Your Updated Medication List  
  
   
 This list is accurate as of: 9/8/17  4:09 PM.  Always use your most recent med list.  
  
  
  
  
 cholecalciferol (vitamin D3) 2,000 unit Tab Take  by mouth. DULoxetine 30 mg capsule Commonly known as:  CYMBALTA Take 1 Cap by mouth daily. gabapentin 300 mg capsule Commonly known as:  NEURONTIN  
TAKE 1 CAPSULE THREE TIMES DAILY  
  
 hydroCHLOROthiazide 12.5 mg tablet Commonly known as:  HYDRODIURIL  
TAKE 1 TABLET EVERY DAY  
  
 insulin detemir 100 unit/mL (3 mL) Inpn Commonly known as:  LEVEMIR FLEXTOUCH  
40 units at bedtime dx: E11.9 Insulin Needles (Disposable) 31 gauge x 5/16\" Ndle Once a day injection with Levemir INVOKANA 100 mg tablet Generic drug:  canagliflozin TAKE 1 TABLET DAILY BEFORE BREAKFAST. lidocaine 2 % jelly Commonly known as:  XYLOCAINE  
APPLY A THIN FILM TO AFFECTED AREA ONLY(LOW BACK AND NECK ONLY) ONE TIME DAILY AS NEEDED LORazepam 0.5 mg tablet Commonly known as:  ATIVAN  
TAKE 1 TABLET BY MOUTH TWICE DAILY AS NEEDED FOR ANXIETY  
  
 metFORMIN  mg tablet Commonly known as:  GLUCOPHAGE XR  
TAKE 2 TABLETS DAILY WITH DINNER  
  
 mometasone 0.1 % topical cream  
Commonly known as:  ELOCON  
APPLY TO THE AFFECTED AREA TWICE A DAY AS NEEDED FOR ALLERGIC RASH  
  
 montelukast 10 mg tablet Commonly known as:  SINGULAIR  
TAKE 1 TABLET EVERY DAY  
  
 mupirocin 2 % ointment Commonly known as:  [employer] Apply  to affected area two (2) times a day. As needed for skin infection  
  
 rosuvastatin 5 mg tablet Commonly known as:  CRESTOR  
TAKE 1 TABLET EVERY NIGHT  
  
 SUMAtriptan 100 mg tablet Commonly known as:  IMITREX Take 1 Tab by mouth once as needed for Migraine for up to 1 dose. topiramate 25 mg tablet Commonly known as:  TOPAMAX TAKE 2 TABLETS BY MOUTH EVERY NIGHT AT BEDTIME  
  
 traMADol 50 mg tablet Commonly known as:  Chris Mendoza  
 TAKE 1 TABLET BY MOUTH TWICE DAILY AS NEEDED FOR PAIN - must last 30 days  
  
 undecylenic acid-chloroxylenol 25-3 % Soln  
by Apply Externally route. Prescriptions Printed Refills  
 traMADol (ULTRAM) 50 mg tablet 2 Sig: TAKE 1 TABLET BY MOUTH TWICE DAILY AS NEEDED FOR PAIN - must last 30 days Class: Print We Performed the Following HEMOGLOBIN A1C WITH EAG [53020 CPT(R)] LIPID PANEL [34977 CPT(R)] MICROALBUMIN, UR, RAND W/ MICROALBUMIN/CREA RATIO [id number] CPT(R)] Introducing hospitals & HEALTH SERVICES! Dear Cali Meade: 
Thank you for requesting a [employer] account. Our records indicate that you already have an active [employer] account. You can access your account anytime at [email or web address] Did you know that you can access your hospital and ER discharge instructions at any time in [employer]? You can also review all of your test results from your hospital stay or ER visit. Additional Information If you have questions, please visit the Frequently Asked Questions section of the [employer] website at [email or web address] Remember, [employer] is NOT to be used for urgent needs. For medical emergencies, dial 911. Now available from your iPhone and Android! Please provide this summary of care documentation to your next provider. Your primary care clinician is listed as Ricky Hernandez. If you have any questions after today's visit, please call [phone].

## 2017-09-09 LAB
ALBUMIN/CREAT UR: 8.4 MG/G CREAT (ref 0–30)
CHOLEST SERPL-MCNC: 122 MG/DL (ref 100–199)
CREAT UR-MCNC: 84.7 MG/DL
EST. AVERAGE GLUCOSE BLD GHB EST-MCNC: 146 MG/DL
HBA1C MFR BLD: 6.7 % (ref 4.8–5.6)
HDLC SERPL-MCNC: 66 MG/DL
INTERPRETATION, 910389: NORMAL
LDLC SERPL CALC-MCNC: 37 MG/DL (ref 0–99)
Lab: NORMAL
MICROALBUMIN UR-MCNC: 7.1 UG/ML
TRIGL SERPL-MCNC: 93 MG/DL (ref 0–149)
VLDLC SERPL CALC-MCNC: 19 MG/DL (ref 5–40)

## 2017-09-10 RX ORDER — TOPIRAMATE 25 MG/1
TABLET ORAL
Qty: 60 TAB | Refills: 0 | Status: SHIPPED | OUTPATIENT
Start: 2017-09-10 | End: 2017-12-13 | Stop reason: SDUPTHER

## 2017-09-11 NOTE — PROGRESS NOTES
Hey there, your cholesterol is beautiful and your sugar has gone amazing with the injection.  No changes in medicaitons at this time, recheck 3 months fasting, Zoë

## 2017-09-15 DIAGNOSIS — L98.9 SKIN LESION: Primary | ICD-10-CM

## 2017-10-09 RX ORDER — TOPIRAMATE 25 MG/1
TABLET ORAL
Qty: 60 TAB | Refills: 0 | Status: SHIPPED | OUTPATIENT
Start: 2017-10-09 | End: 2017-12-13 | Stop reason: ALTCHOICE

## 2017-10-27 RX ORDER — LORAZEPAM 0.5 MG/1
TABLET ORAL
Qty: 60 TAB | Refills: 0 | OUTPATIENT
Start: 2017-10-27 | End: 2017-12-13 | Stop reason: SDUPTHER

## 2017-11-08 RX ORDER — TOPIRAMATE 25 MG/1
TABLET ORAL
Qty: 60 TAB | Refills: 0 | Status: SHIPPED | OUTPATIENT
Start: 2017-11-08 | End: 2017-12-13 | Stop reason: SDUPTHER

## 2017-11-20 RX ORDER — INSULIN DETEMIR 100 [IU]/ML
INJECTION, SOLUTION SUBCUTANEOUS
Qty: 45 ML | Refills: 3 | Status: SHIPPED | OUTPATIENT
Start: 2017-11-20 | End: 2018-06-14 | Stop reason: SDUPTHER

## 2017-11-20 RX ORDER — TOPIRAMATE 50 MG/1
TABLET, FILM COATED ORAL
Qty: 180 TAB | Refills: 3 | Status: SHIPPED | OUTPATIENT
Start: 2017-11-20 | End: 2017-12-13 | Stop reason: ALTCHOICE

## 2017-11-20 RX ORDER — GABAPENTIN 300 MG/1
CAPSULE ORAL
Qty: 270 CAP | Refills: 3 | Status: SHIPPED | OUTPATIENT
Start: 2017-11-20 | End: 2018-11-13 | Stop reason: SDUPTHER

## 2017-11-20 RX ORDER — ROSUVASTATIN CALCIUM 5 MG/1
TABLET, COATED ORAL
Qty: 90 TAB | Refills: 3 | Status: SHIPPED | OUTPATIENT
Start: 2017-11-20 | End: 2018-11-13 | Stop reason: SDUPTHER

## 2017-11-20 RX ORDER — HYDROCHLOROTHIAZIDE 12.5 MG/1
TABLET ORAL
Qty: 90 TAB | Refills: 3 | Status: SHIPPED | OUTPATIENT
Start: 2017-11-20 | End: 2018-11-13 | Stop reason: SDUPTHER

## 2017-11-20 RX ORDER — CANAGLIFLOZIN 100 MG/1
TABLET, FILM COATED ORAL
Qty: 90 TAB | Refills: 1 | Status: SHIPPED | OUTPATIENT
Start: 2017-11-20 | End: 2017-11-29 | Stop reason: SDUPTHER

## 2017-11-20 RX ORDER — MELOXICAM 15 MG/1
TABLET ORAL
Qty: 90 TAB | Refills: 3 | Status: SHIPPED | OUTPATIENT
Start: 2017-11-20 | End: 2018-11-13 | Stop reason: SDUPTHER

## 2017-11-20 RX ORDER — METFORMIN HYDROCHLORIDE 500 MG/1
TABLET, EXTENDED RELEASE ORAL
Qty: 180 TAB | Refills: 3 | Status: SHIPPED | OUTPATIENT
Start: 2017-11-20 | End: 2017-11-29 | Stop reason: SDUPTHER

## 2017-11-21 RX ORDER — DULOXETIN HYDROCHLORIDE 30 MG/1
CAPSULE, DELAYED RELEASE ORAL
Qty: 90 CAP | Refills: 3 | Status: SHIPPED | OUTPATIENT
Start: 2017-11-21 | End: 2018-11-13 | Stop reason: SDUPTHER

## 2017-11-29 DIAGNOSIS — E11.9 CONTROLLED TYPE 2 DIABETES MELLITUS WITHOUT COMPLICATION, WITH LONG-TERM CURRENT USE OF INSULIN (HCC): Primary | ICD-10-CM

## 2017-11-29 DIAGNOSIS — Z79.4 CONTROLLED TYPE 2 DIABETES MELLITUS WITHOUT COMPLICATION, WITH LONG-TERM CURRENT USE OF INSULIN (HCC): Primary | ICD-10-CM

## 2017-11-29 RX ORDER — METFORMIN HYDROCHLORIDE 500 MG/1
TABLET, EXTENDED RELEASE ORAL
Qty: 360 TAB | Refills: 3 | Status: SHIPPED | OUTPATIENT
Start: 2017-11-29 | End: 2018-11-13 | Stop reason: SDUPTHER

## 2017-12-07 ENCOUNTER — DOCUMENTATION ONLY (OUTPATIENT)
Dept: FAMILY MEDICINE CLINIC | Age: 57
End: 2017-12-07

## 2017-12-07 NOTE — PROGRESS NOTES
[employer] form for Invokana 2 tabs every day completed and placed on Zoë's desk for signature then fax.

## 2017-12-10 RX ORDER — TOPIRAMATE 25 MG/1
TABLET ORAL
Qty: 60 TAB | Refills: 0 | Status: SHIPPED | OUTPATIENT
Start: 2017-12-10 | End: 2017-12-13 | Stop reason: ALTCHOICE

## 2017-12-13 ENCOUNTER — OFFICE VISIT (OUTPATIENT)
Dept: FAMILY MEDICINE CLINIC | Age: 57
End: 2017-12-13

## 2017-12-13 VITALS
BODY MASS INDEX: 27.89 KG/M2 | RESPIRATION RATE: 16 BRPM | HEART RATE: 82 BPM | TEMPERATURE: 97.9 F | DIASTOLIC BLOOD PRESSURE: 76 MMHG | SYSTOLIC BLOOD PRESSURE: 114 MMHG | WEIGHT: 163.38 LBS | OXYGEN SATURATION: 99 % | HEIGHT: 64 IN

## 2017-12-13 DIAGNOSIS — Z79.4 CONTROLLED TYPE 2 DIABETES MELLITUS WITHOUT COMPLICATION, WITH LONG-TERM CURRENT USE OF INSULIN (HCC): ICD-10-CM

## 2017-12-13 DIAGNOSIS — E11.9 CONTROLLED TYPE 2 DIABETES MELLITUS WITHOUT COMPLICATION, WITH LONG-TERM CURRENT USE OF INSULIN (HCC): ICD-10-CM

## 2017-12-13 DIAGNOSIS — G47.33 OSA (OBSTRUCTIVE SLEEP APNEA): Primary | ICD-10-CM

## 2017-12-13 DIAGNOSIS — M51.36 DDD (DEGENERATIVE DISC DISEASE), LUMBAR: ICD-10-CM

## 2017-12-13 RX ORDER — TOPIRAMATE 25 MG/1
TABLET ORAL
Qty: 60 TAB | Refills: 5 | Status: SHIPPED | OUTPATIENT
Start: 2017-12-13 | End: 2018-03-08 | Stop reason: SDUPTHER

## 2017-12-13 RX ORDER — TRAMADOL HYDROCHLORIDE 50 MG/1
TABLET ORAL
Qty: 60 TAB | Refills: 2 | Status: SHIPPED | OUTPATIENT
Start: 2017-12-13 | End: 2018-03-08 | Stop reason: SDUPTHER

## 2017-12-13 RX ORDER — LORAZEPAM 0.5 MG/1
TABLET ORAL
Qty: 60 TAB | Refills: 1 | Status: SHIPPED | OUTPATIENT
Start: 2017-12-13 | End: 2018-03-08 | Stop reason: SDUPTHER

## 2017-12-13 NOTE — MR AVS SNAPSHOT
Visit Information Date & Time Provider Department Dept. Phone Encounter #  
 12/13/2017  2:15 PM iWng Velasco, Elinor Villavicencio [phone] [id number] Upcoming Health Maintenance Date Due Pneumococcal 19-64 Medium Risk (1 of 1 - PPSV23) 6/30/1977 DTaP/Tdap/Td series (1 - Tdap) 6/30/1979 EYE EXAM RETINAL OR DILATED Q1 8/18/2016 Influenza Age 5 to Adult 8/1/2017 FOOT EXAM Q1 11/28/2017 COLONOSCOPY 1/1/2018 HEMOGLOBIN A1C Q6M 3/8/2018 MICROALBUMIN Q1 9/8/2018 LIPID PANEL Q1 9/8/2018 Allergies as of 12/13/2017  Review Complete On: 12/13/2017 By: Sheldon Russ LPN No Known Allergies Current Immunizations  Reviewed on 11/23/2015 Name Date Influenza Vaccine 9/12/2017, 10/29/2013 Influenza Vaccine Ese Elaine) 12/5/2014 Influenza Vaccine (Quad) PF 11/28/2016, 11/23/2015 Influenza Vaccine Whole 10/1/2011 Pneumococcal Conjugate (PCV-13) 9/12/2017 Not reviewed this visit You Were Diagnosed With   
  
 Codes Comments NICOLE (obstructive sleep apnea)    -  Primary ICD-10-CM: G47.33 
ICD-9-CM: 327.23   
 DDD (degenerative disc disease), lumbar     ICD-10-CM: M51.36 
ICD-9-CM: 722.52 Vitals BP Pulse Temp Resp Height(growth percentile) Weight(growth percentile) 114/76 82 97.9 °F (36.6 °C) (Oral) 16 5' 4\" (1.626 m) 163 lb 6 oz (74.1 kg) LMP SpO2 BMI OB Status Smoking Status (LMP Unknown) 99% 28.04 kg/m2 Hysterectomy Never Smoker Vitals History BMI and BSA Data Body Mass Index Body Surface Area 28.04 kg/m 2 1.83 m 2 Preferred Pharmacy Pharmacy Name Phone 310 [facility] [place] [address] [facility] [address] [facility] ([address] [phone] Your Updated Medication List  
  
   
This list is accurate as of: 12/13/17  2:44 PM.  Always use your most recent med list.  
  
  
  
  
 canagliflozin 100 mg tablet Commonly known as:  Julio C Phillips Take 2 Tabs by mouth Daily (before breakfast). cholecalciferol (vitamin D3) 2,000 unit Tab Take  by mouth. DULoxetine 30 mg capsule Commonly known as:  CYMBALTA TAKE 1 CAPSULE EVERY DAY  
  
 gabapentin 300 mg capsule Commonly known as:  NEURONTIN  
TAKE 1 CAPSULE THREE TIMES DAILY  
  
 hydroCHLOROthiazide 12.5 mg tablet Commonly known as:  HYDRODIURIL  
TAKE 1 TABLET EVERY DAY Insulin Needles (Disposable) 31 gauge x 5/16\" Ndle Once a day injection with Levemir LEVEMIR FLEXTOUCH 100 unit/mL (3 mL) Inpn Generic drug:  insulin detemir INJECT  40 UNITS SUBCUTANEOUSLY AT BEDTIME  
  
 lidocaine 2 % jelly Commonly known as:  XYLOCAINE  
APPLY A THIN FILM TO AFFECTED AREA ONLY(LOW BACK AND NECK ONLY) ONE TIME DAILY AS NEEDED LORazepam 0.5 mg tablet Commonly known as:  ATIVAN  
TAKE 1 TABLET BY MOUTH TWICE DAILY AS NEEDED FOR ANXIETY  
  
 meloxicam 15 mg tablet Commonly known as:  MOBIC  
TAKE 1 TABLET EVERY DAY  
  
 metFORMIN  mg tablet Commonly known as:  GLUCOPHAGE XR  
TAKE 2 TABLETS DAILY WITH BREAKFAST AND DINNER  
  
 mometasone 0.1 % topical cream  
Commonly known as:  ELOCON  
APPLY TO THE AFFECTED AREA TWICE A DAY AS NEEDED FOR ALLERGIC RASH  
  
 montelukast 10 mg tablet Commonly known as:  SINGULAIR  
TAKE 1 TABLET EVERY DAY  
  
 mupirocin 2 % ointment Commonly known as:  [employer] Apply  to affected area two (2) times a day. As needed for skin infection  
  
 rosuvastatin 5 mg tablet Commonly known as:  CRESTOR  
TAKE 1 TABLET EVERY NIGHT  
  
 SUMAtriptan 100 mg tablet Commonly known as:  IMITREX Take 1 Tab by mouth once as needed for Migraine for up to 1 dose. topiramate 25 mg tablet Commonly known as:  TOPAMAX TAKE 2 TABLETS BY MOUTH EVERY NIGHT AT BEDTIME  
  
 traMADol 50 mg tablet Commonly known as:  ULTRAM  
TAKE 1 TABLET BY MOUTH TWICE DAILY AS NEEDED FOR PAIN - must last 30 days undecylenic acid-chloroxylenol 25-3 % Soln  
by Apply Externally route. Prescriptions Printed Refills  
 traMADol (ULTRAM) 50 mg tablet 2 Sig: TAKE 1 TABLET BY MOUTH TWICE DAILY AS NEEDED FOR PAIN - must last 30 days Class: Print LORazepam (ATIVAN) 0.5 mg tablet 1 Sig: TAKE 1 TABLET BY MOUTH TWICE DAILY AS NEEDED FOR ANXIETY Class: Print Prescriptions Sent to Pharmacy Refills  
 topiramate (TOPAMAX) 25 mg tablet 5 Sig: TAKE 2 TABLETS BY MOUTH EVERY NIGHT AT BEDTIME Class: Normal  
 Pharmacy: [facility] [address] [facility] ([address]  #: [phone] We Performed the Following REFERRAL TO PULMONARY DISEASE [MGA89 Custom] Referral Information Referral ID Referred By Referred To  
  
 [id number] [facility] [address] [place] [address] Visits Status Start Date End Date 1 New Request 12/13/17 12/13/18 If your referral has a status of pending review or denied, additional information will be sent to support the outcome of this decision. Introducing \[facility] [place] [facility]\"" & HEALTH SERVICES! Dear Lalita Hines: 
Thank you for requesting a [employer] account. Our records indicate that you already have an active [employer] account. You can access your account anytime at [email or web address] | [employer]. [employer] | [employer] Did you know that you can access your hospital and ER discharge instructions at any time in [employer]? You can also review all of your test results from your hospital stay or ER visit. Additional Information If you have questions, please visit the Frequently Asked Questions section of the [employer] website at [email or web address] | [employer]. [employer] | [employer]/. Remember, [employer] is NOT to be used for urgent needs. For medical emergencies, dial 911. Now available from your iPhone and Android! Please provide this summary of care documentation to your next provider. Your primary care clinician is listed as Sherrie Turpin. If you have any questions after today's visit, please call [phone].

## 2017-12-13 NOTE — PROGRESS NOTES
1. Have you been to the ER, urgent care clinic since your last visit? Hospitalized since your last visit? No    2. Have you seen or consulted any other health care providers outside of the [address] [place] [address] since your last visit? Include any pap smears or colon screening.  No    Chief Complaint   Patient presents with    Follow-up     3 mo f/u, DM, med ck    Labs     not fasting

## 2017-12-21 NOTE — PROGRESS NOTES
HISTORY OF PRESENT ILLNESS  Inocencio Damon is a 59 y.o. female. HPI  Diabetes Mellitus:  She has diabetes mellitus, and  hyperlipidemia. Diabetic ROS - medication compliance: compliant all of the time, diabetic diet compliance: compliant all of the time, home glucose monitoring: fasting values range 140 or less. Lab review: orders written for new lab studies as appropriate; see orders. DDD lumbar:  Due for refill of tramadol for pain  Takes very prn, no early refill requests  NICOLE:  Snoring and fatigued during the day  Was worked up several years ago, does not have a cpap machine      Patient Active Problem List    Diagnosis Date Noted    Advanced care planning/counseling discussion 11/23/2015    Urinary frequency 10/05/2015    Diabetes mellitus type 2, controlled ([facility] [address] 04/17/2015    NICOLE (obstructive sleep apnea) 11/05/2013    Diverticulosis 10/29/2013    DDD (degenerative disc disease), lumbar 07/30/2013    Depression 07/30/2013    GERD (gastroesophageal reflux disease) 07/02/2013    Dysphagia 07/02/2013    Status post gastric banding 07/02/2013    Lumbar stenosis 11/07/2011    Morbid obesity ([facility] [address] 07/19/2011     Current Outpatient Prescriptions   Medication Sig Dispense Refill    traMADol (ULTRAM) 50 mg tablet TAKE 1 TABLET BY MOUTH TWICE DAILY AS NEEDED FOR PAIN - must last 30 days 60 Tab 2    topiramate (TOPAMAX) 25 mg tablet TAKE 2 TABLETS BY MOUTH EVERY NIGHT AT BEDTIME 60 Tab 5    LORazepam (ATIVAN) 0.5 mg tablet TAKE 1 TABLET BY MOUTH TWICE DAILY AS NEEDED FOR ANXIETY 60 Tab 1    metFORMIN ER (GLUCOPHAGE XR) 500 mg tablet TAKE 2 TABLETS DAILY WITH BREAKFAST AND DINNER 360 Tab 3    canagliflozin (INVOKANA) 100 mg tablet Take 2 Tabs by mouth Daily (before breakfast).  180 Tab 1    DULoxetine (CYMBALTA) 30 mg capsule TAKE 1 CAPSULE EVERY DAY 90 Cap 3    gabapentin (NEURONTIN) 300 mg capsule TAKE 1 CAPSULE THREE TIMES DAILY 270 Cap 3    hydroCHLOROthiazide (HYDRODIURIL) 12.5 mg tablet TAKE 1 TABLET EVERY DAY 90 Tab 3    rosuvastatin (CRESTOR) 5 mg tablet TAKE 1 TABLET EVERY NIGHT 90 Tab 3    LEVEMIR FLEXTOUCH 100 unit/mL (3 mL) inpn INJECT  40 UNITS SUBCUTANEOUSLY AT BEDTIME 45 mL 3    meloxicam (MOBIC) 15 mg tablet TAKE 1 TABLET EVERY DAY 90 Tab 3    lidocaine (XYLOCAINE) 2 % jelly APPLY A THIN FILM TO AFFECTED AREA ONLY(LOW BACK AND NECK ONLY) ONE TIME DAILY AS NEEDED 30 mL 1    Insulin Needles, Disposable, 31 gauge x 5/16\" ndle Once a day injection with Levemir 15 Package 3    mometasone (ELOCON) 0.1 % topical cream APPLY TO THE AFFECTED AREA TWICE A DAY AS NEEDED FOR ALLERGIC RASH 15 g 0    montelukast (SINGULAIR) 10 mg tablet TAKE 1 TABLET EVERY DAY 90 Tab 3    SUMAtriptan (IMITREX) 100 mg tablet Take 1 Tab by mouth once as needed for Migraine for up to 1 dose. 12 Tab 6    undecylenic acid-chloroxylenol 25-3 % soln by Apply Externally route.  cholecalciferol, vitamin D3, 2,000 unit tab Take  by mouth.  mupirocin (BACTROBAN) 2 % ointment Apply  to affected area two (2) times a day.  As needed for skin infection 22 g 0     Family History   Problem Relation Age of Onset    Post-op Nausea/Vomiting Mother     Delayed Awakening Mother     Psychiatric Disorder Mother     Hypertension Mother     Stroke Mother     Headache Mother     Post-op Nausea/Vomiting Daughter     Delayed Awakening Daughter     Cancer Father     Hypertension Father     Cancer Maternal Grandmother     Psychiatric Disorder Maternal Grandmother     Dementia Paternal Grandmother     Dementia Paternal Grandfather     Parkinson's Disease Paternal Grandfather      Social History   Substance Use Topics    Smoking status: Never Smoker    Smokeless tobacco: Never Used    Alcohol use No           ROS  A comprehensive review of system was obtained and negative except findings in the HPI    Visit Vitals    /76    Pulse 82    Temp 97.9 °F (36.6 °C) (Oral)    Resp 16    Ht 5' 4\" (1.626 m)    Wt 163 lb 6 oz (74.1 kg)    LMP  (LMP Unknown)    SpO2 99%    BMI 28.04 kg/m2     Physical Exam   Constitutional: She is oriented to person, place, and time. She appears well-developed and well-nourished. Neck: No JVD present. Cardiovascular: Normal rate, regular rhythm and intact distal pulses. Exam reveals no gallop and no friction rub. No murmur heard. Pulmonary/Chest: Effort normal and breath sounds normal. No respiratory distress. She has no wheezes. Musculoskeletal: She exhibits no edema. Neurological: She is alert and oriented to person, place, and time. Skin: Skin is warm. Nursing note and vitals reviewed. ASSESSMENT and PLAN  Encounter Diagnoses   Name Primary?  NICOLE (obstructive sleep apnea) Yes    DDD (degenerative disc disease), lumbar     Controlled type 2 diabetes mellitus without complication, with long-term current use of insulin ([facility])      Orders Placed This Encounter    REFERRAL TO PULMONARY DISEASE    AMB POC HEMOGLOBIN A1C    traMADol (ULTRAM) 50 mg tablet    topiramate (TOPAMAX) 25 mg tablet    LORazepam (ATIVAN) 0.5 mg tablet     Refills updated today for meds for pain and migraine  Labs updated for DM  Referral to pulm for NICOLE tanya    I have discussed the diagnosis with the patient and the intended plan as seen in the above orders. The patient has received an after-visit summary and questions were answered concerning future plans. Patient conveyed understanding of the plan at the time of the visit.     Catalina Cedillo, MSN, ANP  12/20/2017

## 2018-01-11 RX ORDER — TOPIRAMATE 25 MG/1
TABLET ORAL
Qty: 60 TAB | Refills: 0 | Status: SHIPPED | OUTPATIENT
Start: 2018-01-11 | End: 2019-05-22 | Stop reason: SDUPTHER

## 2018-03-08 ENCOUNTER — OFFICE VISIT (OUTPATIENT)
Dept: FAMILY MEDICINE CLINIC | Age: 58
End: 2018-03-08

## 2018-03-08 VITALS
RESPIRATION RATE: 16 BRPM | TEMPERATURE: 98.6 F | HEART RATE: 85 BPM | DIASTOLIC BLOOD PRESSURE: 84 MMHG | BODY MASS INDEX: 28.85 KG/M2 | WEIGHT: 169 LBS | SYSTOLIC BLOOD PRESSURE: 120 MMHG | OXYGEN SATURATION: 97 % | HEIGHT: 64 IN

## 2018-03-08 DIAGNOSIS — M51.36 DDD (DEGENERATIVE DISC DISEASE), LUMBAR: ICD-10-CM

## 2018-03-08 DIAGNOSIS — Z79.4 CONTROLLED TYPE 2 DIABETES MELLITUS WITHOUT COMPLICATION, WITH LONG-TERM CURRENT USE OF INSULIN (HCC): Primary | ICD-10-CM

## 2018-03-08 DIAGNOSIS — E11.9 CONTROLLED TYPE 2 DIABETES MELLITUS WITHOUT COMPLICATION, WITH LONG-TERM CURRENT USE OF INSULIN (HCC): Primary | ICD-10-CM

## 2018-03-08 DIAGNOSIS — F41.9 ANXIETY: ICD-10-CM

## 2018-03-08 RX ORDER — LORAZEPAM 0.5 MG/1
TABLET ORAL
Qty: 60 TAB | Refills: 1 | Status: SHIPPED | OUTPATIENT
Start: 2018-03-08 | End: 2018-06-13 | Stop reason: SDUPTHER

## 2018-03-08 RX ORDER — TRAMADOL HYDROCHLORIDE 50 MG/1
TABLET ORAL
Qty: 60 TAB | Refills: 2 | Status: SHIPPED | OUTPATIENT
Start: 2018-03-08 | End: 2018-06-13 | Stop reason: SDUPTHER

## 2018-03-08 NOTE — PROGRESS NOTES
1. Have you been to the ER, urgent care clinic since your last visit? Hospitalized since your last visit? Yes, [facility], 3/3/18    2. Have you seen or consulted any other health care providers outside of the [address] [place] [address] since your last visit? Include any pap smears or colon screening.  No     Chief Complaint   Patient presents with    Medication Refill     Tramadol and Lorazepam

## 2018-03-08 NOTE — PROGRESS NOTES
Chief Complaint   Patient presents with    Medication Refill     Tramadol and Lorazepam     she is a 59y.o. year old female who presents for evalution. Pt here for DM and cholesterol F/U. States has been eating a lot of black jelly beans due to easter time - usually diet is well controlled. No episodes of hypoglycemia. Eye exam in past year. Takes medication daily. Donna Stephens RTC today to follow up on chronic pain diagnosis. We discussed her osteoarthritis that is affecting her back. Significant changes since last visit: none. She is  able to do her normal daily activities. She reports the following adverse side effects: none. Least pain over the last week has been 3/10. Worst pain over the last week has been 8/10. Opioid Risk Tool Reviewed: YES    Aberrant behaviors: None. Urine Drug Screen: due - order placed. Controlled substance agreement on file: YES.  reviewed:yes    Pill count is consistent with her prescription: yes    Concomitant use of a benzodiazepine: yes    Naloxone prescription is warranted. It has been provided or is already on file. Also,  abstinence syndrome was reviewed and discussed with her today N/A    Reviewed PmHx, RxHx, FmHx, SocHx, AllgHx and updated and dated in the chart. Review of Systems - negative except as listed above in the HPI    Objective:     Vitals:    18 0951   BP: 120/84   Pulse: 85   Resp: 16   Temp: 98.6 °F (37 °C)   TempSrc: Oral   SpO2: 97%   Weight: 169 lb (76.7 kg)   Height: 5' 4\" (1.626 m)     Physical Examination: General appearance - alert, well appearing, and in no distress  Chest - clear to auscultation, no wheezes, rales or rhonchi, symmetric air entry  Heart - normal rate, regular rhythm, normal S1, S2, no murmurs, rubs, clicks or gallops  Extremities - peripheral pulses normal, no pedal edema, no clubbing or cyanosis    Assessment/ Plan:   Diagnoses and all orders for this visit:    1. Controlled type 2 diabetes mellitus without complication, with long-term current use of insulin (HCC)  -     LIPID PANEL  -     METABOLIC PANEL, COMPREHENSIVE  -     HEMOGLOBIN A1C WITH EAG  Stable. Discussed importance of diabetic diet, need for regular exercise. Reviewed disease process and complications that arise from poor control of blood sugars - kidney damage, blindness, amputation, paresthesias. Goal of HgbA1c below 7 and LDL below 100. Need for yearly eye exam and regular foot care. Cont current medications, F/U 3 mo. 2. DDD (degenerative disc disease), lumbar  -     COMPREHENSIVE DRUG ANALYSIS,UR  -     traMADol (ULTRAM) 50 mg tablet; TAKE 1 TABLET BY MOUTH TWICE DAILY AS NEEDED FOR PAIN - must last 30 days  -     naloxone 2 mg/actuation spry; Use 1 spray intranasally into 1 nostril. Use a new Narcan nasal spray for subsequent doses and administer into alternating nostrils. May repeat every 2 to 3 minutes as needed. Updated pain contract. Reviewed , no suspicious fills. F/U 3 mo. This is a chronic problem that is stable. Per review of available records and patients , there are not sign of overuse, misuse, diversion, or concerning side effects. Today we reviewed: the risk of overdose, addiction, and dependency proper storage and disposal of medications the goals of treatment (improve functionality, quality of life, and pain)  The following changes were made to the patients current treatment plan: nothing, medications refilled. 3. Anxiety  -     COMPREHENSIVE DRUG ANALYSIS,UR  -     LORazepam (ATIVAN) 0.5 mg tablet; TAKE 1 TABLET BY MOUTH TWICE DAILY AS NEEDED FOR ANXIETY  -     naloxone 2 mg/actuation spry; Use 1 spray intranasally into 1 nostril. Use a new Narcan nasal spray for subsequent doses and administer into alternating nostrils. May repeat every 2 to 3 minutes as needed. Stable - refills provided. Pt voiced understanding regarding plan of care.      Follow-up Disposition:  Return if symptoms worsen or fail to improve. I have discussed the diagnosis with the patient and the intended plan as seen in the above orders. The patient has received an after-visit summary and questions were answered concerning future plans.      Medication Side Effects and Warnings were discussed with patient    Moshe Puga NP

## 2018-03-08 NOTE — LETTER
Name:.Poly Yip RSB:[birth year] MR #:[id number] Provider Cara Mitchell NP  
*[id number]* BSMG-491 (5/16) Page 1 of 5 Initial [employer] CONTROLLED SUBSTANCE AGREEMENT I may be prescribed medications that are controlled substances as part  of my treatment plan for management of my medical condition(s). The goal of my treatment plan is to maintain and/or improve my health and wellbeing. Because controlled substances have an increased risk of abuse or harm, continual re-evaluation is needed determine if the goals of my treatment plan are being met for my safety and the safety of others. Shirin Tovar  am entering into this Controlled Substance Agreement with my provider, Mateo Severin, NP at [facility] . I understand that successful treatment requires mutual trust and honesty between me and my provider. I understand that there are state and federal laws and regulations which apply to the medications that my provider may prescribe that must be followed. I understand there are risks and benefits ts of taking the medicines that my provider may prescribe. I understand and agree that following this Agreement is necessary in continuing my provider-patient relationship and success of my treatment plan. As a part of my treatment plan, I agree to the following: COMMUNICATION: 
 
1. I will communicate fully with my provider about my medical condition(s), including the effect on my daily life and how well my medications are helping. I will tell my provider all of the medications that I take for any reason, including medications I receive from another health care provider, and will notify my provider about all issues, problems or concerns, including any side effects, which may be related to my medications. I understand that this information allows my provider to adjust my treatment plan to help manage my medical condition.  I understand that this information will become part of my permanent medical record. 2. I will notify my provider if I have a history of alcohol/drug misuse/addiction or if I have had treatment for alcohol/drug addiction in the past, or if I have a new problem with or concern about alcohol/drug use/addiction, because this increases the likelihood of high risk behaviors and may lead to serious medical conditions. 3. Females Only: I will notify my provider if I am or become pregnant, or if I intend to become pregnant, or if I intend to breastfeed. I understand that communication of these issues with my provider is important, due to possible effects my medication could have on an unborn fetus or breastfeeding child. Name:.Poly Yip OJJ:[birth year] MR #:[id number] Provider Vish Lopez NP  
*[id number]* BSMG-491 (5/16) Page 2 of 5 Initial [employer] MISUSE OF MEDICATIONS / DRUGS: 
 
1. I agree to take all controlled substances as prescribed, and will not misuse or abuse any controlled substances prescribed by my provider. For my safety, I will not increase the amount of medicine I take without first talking with and getting permission from my provider. 2. If I have a medical emergency, another health care provider may prescribe me medication. If I seek emergency treatment, I will notify my provider within seventy-two (72) hours. 3. I understand that my provider may discuss my use and/or possible misuse/abuse of controlled substances and alcohol, as appropriate, with any health care provider involved in my care, pharmacist or legal authority. ILLEGAL DRUGS: 
 
1. I will not use illegal drugs of any kind, including but not limited to marijuana, heroin, cocaine, or any prescription drug which is not prescribed to me. DRUG DIVERSION / PRESCRIPTION FRAUD: 
 
1. I will not share, sell, trade, give away, or otherwise misuse my prescriptions or medications. 2. I will not alter any prescriptions provided to me by my provider. SINGLE PROVIDER: 
 
1. I agree that all controlled substances that I take will be prescribed only by my provider (or his/her covering provider) under this Agreement. This agreement does not prevent me from seeking emergency medical treatment or receiving pain management related to a surgery. PROTECTING MEDICATIONS: 
 
1. I am responsible for keeping my prescriptions and medications in a safe and secure place including safeguarding them from loss or theft. I understand that lost, stolen or damaged/destroyed prescriptions or medications will not be replaced. Name:.Poly Yip LZR:[birth year] MR #:[id number] Provider Lee Ann Cruz NP  
*[id number]* BSMG-491 (5/16) Page 3 of 5 Initial [employer] PRESCRIPTION RENEWALS/REFILLS: 
 
1. I will follow my controlled substance medication schedule as prescribed by my provider. 2. I understand and agree that I will make any requests for renewals or refills of my prescriptions only at the time of an office visit or during my providers regular office hours subject to the prescription refill requirements of the individual practice. 3. I understand that my provider may not call in prescriptions for controlled substances to my pharmacy. 4. I understand that my provider may adjust or discontinue these medications as deemed appropriate for my medical treatment plan. This Agreement does not guarantee the prescription of controlled medications. 5. I agree that if my medications are adjusted or discontinued, I will properly dispose of any remaining medications. I understand that I will be required to dispose of any remaining controlled medications prior to being provided with any prescriptions for other controlled medications.  
 
 
1. I authorize my provider and my pharmacy to cooperate fully with any local, state, or federal law enforcement agency in the investigation of any possible misuse, sale, or other diversion of my controlled substance prescriptions or medications. RISKS: 
 
 
1. I understand that if I do not adhere to this Agreement in any way, my provider may change my prescriptions, stop prescribing controlled substances or end our provider-patient relationship. 2. If my provider decides to stop prescribing medication, or decides to end our provider-patient relationship,my provider may require that I taper my medications slowly. If necessary, my provider may also provide a prescription for other medications to treat my withdrawal symptoms. UNDERSTANDING THIS AGREEMENT: 
 
I understand that my provider may adjust or stop my prescriptions for controlled substances based on my medical condition and my treatment plan. I understand that this Agreement does not guarantee that I will be prescribed medications or controlled substances. I understand that controlled substances may be just one part 
of my treatment plan. My initial on each page and my signature below shows that I have read each page of this Agreement, I have had an opportunity to ask questions, and all of my questions have been answered to my satisfaction by my provider. By signing below, I agree to comply with this Agreement, and I understand that if I do not follow the Agreements listed above, my provider may stop 
 
 
 
_________________________________________  Date/Time 3/8/2018 10:02 AM   
             (Patient Signature)

## 2018-03-08 NOTE — MR AVS SNAPSHOT
[address] [address] 
[phone] Patient: Jerad Way MRN: [id number] TZU:[birth year] Visit Information Date & Time Provider Department Dept. Phone Encounter #  
 3/8/2018  9:45 AM Moshe Puga NP [address] [facility] [phone] [id number] Follow-up Instructions Return if symptoms worsen or fail to improve. Upcoming Health Maintenance Date Due Pneumococcal 19-64 Medium Risk (1 of 1 - PPSV23) 6/30/1977 DTaP/Tdap/Td series (1 - Tdap) 6/30/1979 EYE EXAM RETINAL OR DILATED Q1 8/18/2016 FOOT EXAM Q1 11/28/2017 COLONOSCOPY 1/1/2018 HEMOGLOBIN A1C Q6M 3/8/2018 MICROALBUMIN Q1 9/8/2018 LIPID PANEL Q1 9/8/2018 BREAST CANCER SCRN MAMMOGRAM 12/29/2018 Allergies as of 3/8/2018  Review Complete On: 3/8/2018 By: Nancy Ledesma LPN No Known Allergies Current Immunizations  Reviewed on 11/23/2015 Name Date Influenza Vaccine 9/12/2017, 10/29/2013 Influenza Vaccine Priscilla Card) 12/5/2014 Influenza Vaccine (Quad) PF 11/28/2016, 11/23/2015 Influenza Vaccine Whole 10/1/2011 Pneumococcal Conjugate (PCV-13) 9/12/2017 Not reviewed this visit You Were Diagnosed With   
  
 Codes Comments Controlled type 2 diabetes mellitus without complication, with long-term current use of insulin (HCC)    -  Primary ICD-10-CM: E11.9, Z79.4 ICD-9-CM: 250.00, V58.67 DDD (degenerative disc disease), lumbar     ICD-10-CM: M51.36 
ICD-9-CM: 722.52 Anxiety     ICD-10-CM: F41.9 ICD-9-CM: 300.00 Vitals BP Pulse Temp Resp Height(growth percentile) Weight(growth percentile) 120/84 85 98.6 °F (37 °C) (Oral) 16 5' 4\" (1.626 m) 169 lb (76.7 kg) LMP SpO2 BMI OB Status Smoking Status (LMP Unknown) 97% 29.01 kg/m2 Hysterectomy Never Smoker BMI and BSA Data Body Mass Index Body Surface Area  
 29.01 kg/m 2 1.86 m 2 Preferred Pharmacy Pharmacy Name Phone 310 [facility] OF [address] [place] [address] ([address] [phone] Your Updated Medication List  
  
   
This list is accurate as of 3/8/18 10:12 AM.  Always use your most recent med list.  
  
  
  
  
 canagliflozin 100 mg tablet Commonly known as:  Maddie Poisson Take 2 Tabs by mouth Daily (before breakfast). cholecalciferol (vitamin D3) 2,000 unit Tab Take  by mouth. DULoxetine 30 mg capsule Commonly known as:  CYMBALTA TAKE 1 CAPSULE EVERY DAY  
  
 gabapentin 300 mg capsule Commonly known as:  NEURONTIN  
TAKE 1 CAPSULE THREE TIMES DAILY  
  
 hydroCHLOROthiazide 12.5 mg tablet Commonly known as:  HYDRODIURIL  
TAKE 1 TABLET EVERY DAY Insulin Needles (Disposable) 31 gauge x 5/16\" Ndle Once a day injection with Levemir LEVEMIR FLEXTOUCH U-100 INSULN 100 unit/mL (3 mL) Inpn Generic drug:  insulin detemir U-100 INJECT  40 UNITS SUBCUTANEOUSLY AT BEDTIME  
  
 lidocaine 2 % jelly Commonly known as:  XYLOCAINE  
APPLY A THIN FILM TO AFFECTED AREA ONLY(LOW BACK AND NECK ONLY) ONE TIME DAILY AS NEEDED LORazepam 0.5 mg tablet Commonly known as:  ATIVAN  
TAKE 1 TABLET BY MOUTH TWICE DAILY AS NEEDED FOR ANXIETY  
  
 meloxicam 15 mg tablet Commonly known as:  MOBIC  
TAKE 1 TABLET EVERY DAY  
  
 metFORMIN  mg tablet Commonly known as:  GLUCOPHAGE XR  
TAKE 2 TABLETS DAILY WITH BREAKFAST AND DINNER  
  
 mometasone 0.1 % topical cream  
Commonly known as:  ELOCON  
APPLY TO THE AFFECTED AREA TWICE A DAY AS NEEDED FOR ALLERGIC RASH  
  
 montelukast 10 mg tablet Commonly known as:  SINGULAIR  
TAKE 1 TABLET EVERY DAY  
  
 mupirocin 2 % ointment Commonly known as:  [employer] Apply  to affected area two (2) times a day. As needed for skin infection  
  
 naloxone 2 mg/actuation Spry Use 1 spray intranasally into 1 nostril.  Use a new Narcan nasal spray for subsequent doses and administer into alternating nostrils. May repeat every 2 to 3 minutes as needed. rosuvastatin 5 mg tablet Commonly known as:  CRESTOR  
TAKE 1 TABLET EVERY NIGHT  
  
 SUMAtriptan 100 mg tablet Commonly known as:  IMITREX Take 1 Tab by mouth once as needed for Migraine for up to 1 dose. topiramate 25 mg tablet Commonly known as:  TOPAMAX TAKE 2 TABLETS BY MOUTH EVERY NIGHT AT BEDTIME  
  
 traMADol 50 mg tablet Commonly known as:  ULTRAM  
TAKE 1 TABLET BY MOUTH TWICE DAILY AS NEEDED FOR PAIN - must last 30 days  
  
 undecylenic acid-chloroxylenol 25-3 % Soln  
by Apply Externally route. Prescriptions Printed Refills  
 traMADol (ULTRAM) 50 mg tablet 2 Sig: TAKE 1 TABLET BY MOUTH TWICE DAILY AS NEEDED FOR PAIN - must last 30 days Class: Print LORazepam (ATIVAN) 0.5 mg tablet 1 Sig: TAKE 1 TABLET BY MOUTH TWICE DAILY AS NEEDED FOR ANXIETY Class: Print  
 naloxone 2 mg/actuation spry 0 Sig: Use 1 spray intranasally into 1 nostril. Use a new Narcan nasal spray for subsequent doses and administer into alternating nostrils. May repeat every 2 to 3 minutes as needed. Class: Print We Performed the Following COMPREHENSIVE DRUG ANALYSIS,UR [[id number] Custom] HEMOGLOBIN A1C WITH EAG [12426 CPT(R)] LIPID PANEL [14026 CPT(R)] METABOLIC PANEL, COMPREHENSIVE [09088 CPT(R)] Follow-up Instructions Return if symptoms worsen or fail to improve. Patient Instructions Learning About Diabetes Food Guidelines Your Care Instructions Meal planning is important to manage diabetes. It helps keep your blood sugar at a target level (which you set with your doctor). You don't have to eat special foods. You can eat what your family eats, including sweets once in a while. But you do have to pay attention to how often you eat and how much you eat of certain foods. You may want to work with a dietitian or a certified diabetes educator (CDE) to help you plan meals and snacks. A dietitian or CDE can also help you lose weight if that is one of your goals. What should you know about eating carbs? Managing the amount of carbohydrate (carbs) you eat is an important part of healthy meals when you have diabetes. Carbohydrate is found in many foods. · Learn which foods have carbs. And learn the amounts of carbs in different foods. ¨ Bread, cereal, pasta, and rice have about 15 grams of carbs in a serving. A serving is 1 slice of bread (1 ounce), ½ cup of cooked cereal, or 1/3 cup of cooked pasta or rice. ¨ Fruits have 15 grams of carbs in a serving. A serving is 1 small fresh fruit, such as an apple or orange; ½ of a banana; ½ cup of cooked or canned fruit; ½ cup of fruit juice; 1 cup of melon or raspberries; or 2 tablespoons of dried fruit. ¨ Milk and no-sugar-added yogurt have 15 grams of carbs in a serving. A serving is 1 cup of milk or 2/3 cup of no-sugar-added yogurt. ¨ Starchy vegetables have 15 grams of carbs in a serving. A serving is ½ cup of mashed potatoes or sweet potato; 1 cup winter squash; ½ of a small baked potato; ½ cup of cooked beans; or ½ cup cooked corn or green peas. · Learn how much carbs to eat each day and at each meal. A dietitian or CDE can teach you how to keep track of the amount of carbs you eat. This is called carbohydrate counting. · If you are not sure how to count carbohydrate grams, use the Plate Method to plan meals. It is a good, quick way to make sure that you have a balanced meal. It also helps you spread carbs throughout the day. ¨ Divide your plate by types of foods. Put non-starchy vegetables on half the plate, meat or other protein food on one-quarter of the plate, and a grain or starchy vegetable in the final quarter of the plate.  To this you can add a small piece of fruit and 1 cup of milk or yogurt, depending on how many carbs you are supposed to eat at a meal. 
· Try to eat about the same amount of carbs at each meal. Do not \"save up\" your daily allowance of carbs to eat at one meal. 
· Proteins have very little or no carbs per serving. Examples of proteins are beef, chicken, turkey, fish, eggs, tofu, cheese, cottage cheese, and peanut butter. A serving size of meat is 3 ounces, which is about the size of a deck of cards. Examples of meat substitute serving sizes (equal to 1 ounce of meat) are 1/4 cup of cottage cheese, 1 egg, 1 tablespoon of peanut butter, and ½ cup of tofu. How can you eat out and still eat healthy? · Learn to estimate the serving sizes of foods that have carbohydrate. If you measure food at home, it will be easier to estimate the amount in a serving of restaurant food. · If the meal you order has too much carbohydrate (such as potatoes, corn, or baked beans), ask to have a low-carbohydrate food instead. Ask for a salad or green vegetables. · If you use insulin, check your blood sugar before and after eating out to help you plan how much to eat in the future. · If you eat more carbohydrate at a meal than you had planned, take a walk or do other exercise. This will help lower your blood sugar. What else should you know? · Limit saturated fat, such as the fat from meat and dairy products. This is a healthy choice because people who have diabetes are at higher risk of heart disease. So choose lean cuts of meat and nonfat or low-fat dairy products. Use olive or canola oil instead of butter or shortening when cooking. · Don't skip meals. Your blood sugar may drop too low if you skip meals and take insulin or certain medicines for diabetes. · Check with your doctor before you drink alcohol. Alcohol can cause your blood sugar to drop too low. Alcohol can also cause a bad reaction if you take certain diabetes medicines. Follow-up care is a key part of your treatment and safety.  Be sure to make and go to all appointments, and call your doctor if you are having problems. It's also a good idea to know your test results and keep a list of the medicines you take. Where can you learn more? Go to http://anish-andrews.info/. Enter D886 in the search box to learn more about \"Learning About Diabetes Food Guidelines. \" Current as of: March 13, 2017 Content Version: 11.4 © 0772-6069 [employer]. Care instructions adapted under license by [employer] (which disclaims liability or warranty for this information). If you have questions about a medical condition or this instruction, always ask your healthcare professional. [address] any warranty or liability for your use of this information. Introducing [facility]! Dear Epifanio Simon: 
Thank you for requesting a [employer] account. Our records indicate that you already have an active [employer] account. You can access your account anytime at [email or web address] Did you know that you can access your hospital and ER discharge instructions at any time in [employer]? You can also review all of your test results from your hospital stay or ER visit. Additional Information If you have questions, please visit the Frequently Asked Questions section of the [employer] website at [email or web address] Remember, [employer] is NOT to be used for urgent needs. For medical emergencies, dial 911. Now available from your iPhone and Android! Please provide this summary of care documentation to your next provider. Your primary care clinician is listed as Wilmer Dawkins. If you have any questions after today's visit, please call [phone].

## 2018-03-08 NOTE — PATIENT INSTRUCTIONS

## 2018-03-09 LAB
ALBUMIN SERPL-MCNC: 4 G/DL (ref 3.5–5.5)
ALBUMIN/GLOB SERPL: 1.5 {RATIO} (ref 1.2–2.2)
ALP SERPL-CCNC: 95 IU/L (ref 39–117)
ALT SERPL-CCNC: 11 IU/L (ref 0–32)
AST SERPL-CCNC: 16 IU/L (ref 0–40)
BILIRUB SERPL-MCNC: 0.7 MG/DL (ref 0–1.2)
BUN SERPL-MCNC: 17 MG/DL (ref 6–24)
BUN/CREAT SERPL: 18 (ref 9–23)
CALCIUM SERPL-MCNC: 9.1 MG/DL (ref 8.7–10.2)
CHLORIDE SERPL-SCNC: 98 MMOL/L (ref 96–106)
CHOLEST SERPL-MCNC: 116 MG/DL (ref 100–199)
CO2 SERPL-SCNC: 27 MMOL/L (ref 18–29)
CREAT SERPL-MCNC: 0.95 MG/DL (ref 0.57–1)
EST. AVERAGE GLUCOSE BLD GHB EST-MCNC: 143 MG/DL
GFR SERPLBLD CREATININE-BSD FMLA CKD-EPI: 66 ML/MIN/1.73
GFR SERPLBLD CREATININE-BSD FMLA CKD-EPI: 76 ML/MIN/1.73
GLOBULIN SER CALC-MCNC: 2.6 G/DL (ref 1.5–4.5)
GLUCOSE SERPL-MCNC: 117 MG/DL (ref 65–99)
HBA1C MFR BLD: 6.6 % (ref 4.8–5.6)
HDLC SERPL-MCNC: 51 MG/DL
INTERPRETATION, 910389: NORMAL
LDLC SERPL CALC-MCNC: 46 MG/DL (ref 0–99)
Lab: NORMAL
POTASSIUM SERPL-SCNC: 3.6 MMOL/L (ref 3.5–5.2)
PROT SERPL-MCNC: 6.6 G/DL (ref 6–8.5)
SODIUM SERPL-SCNC: 141 MMOL/L (ref 134–144)
TRIGL SERPL-MCNC: 97 MG/DL (ref 0–149)
VLDLC SERPL CALC-MCNC: 19 MG/DL (ref 5–40)

## 2018-03-14 LAB — DRUGS UR: NORMAL

## 2018-04-03 RX ORDER — ISOPROPYL ALCOHOL 0.75 G/1
SWAB TOPICAL
Qty: 200 PAD | Refills: 3 | Status: SHIPPED | OUTPATIENT
Start: 2018-04-03 | End: 2020-12-24 | Stop reason: SDUPTHER

## 2018-04-03 RX ORDER — MONTELUKAST SODIUM 10 MG/1
TABLET ORAL
Qty: 90 TAB | Refills: 3 | Status: SHIPPED | OUTPATIENT
Start: 2018-04-03 | End: 2018-12-15 | Stop reason: SDUPTHER

## 2018-04-03 RX ORDER — BLOOD SUGAR DIAGNOSTIC
STRIP MISCELLANEOUS
Qty: 200 STRIP | Refills: 3 | Status: SHIPPED | OUTPATIENT
Start: 2018-04-03 | End: 2020-07-24 | Stop reason: SDUPTHER

## 2018-04-19 ENCOUNTER — OFFICE VISIT (OUTPATIENT)
Dept: FAMILY MEDICINE CLINIC | Age: 58
End: 2018-04-19

## 2018-04-19 VITALS
RESPIRATION RATE: 16 BRPM | OXYGEN SATURATION: 97 % | HEART RATE: 86 BPM | WEIGHT: 169 LBS | HEIGHT: 64 IN | BODY MASS INDEX: 28.85 KG/M2 | SYSTOLIC BLOOD PRESSURE: 121 MMHG | DIASTOLIC BLOOD PRESSURE: 87 MMHG | TEMPERATURE: 99 F

## 2018-04-19 DIAGNOSIS — T78.40XA ALLERGIC REACTION, INITIAL ENCOUNTER: Primary | ICD-10-CM
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
## 2018-04-19 NOTE — PROGRESS NOTES
Chief Complaint   Patient presents with    Insect Bite     Bites all over body, x3 week     she is a 59y.o. year old female who presents for evalution. Pt had bite on body about 3 week ago, just thought was spider bite. However, has continued to get new bites al over body - very itchy, swelling. Pt has washed all sheets, sprayed around house, nothing is really helping. Reviewed PmHx, RxHx, FmHx, SocHx, AllgHx and updated and dated in the chart. Review of Systems - negative except as listed above in the HPI    Objective:     Vitals:    04/19/18 1531   BP: 121/87   Pulse: 86   Resp: 16   Temp: 99 °F (37.2 °C)   TempSrc: Oral   SpO2: 97%   Weight: 169 lb (76.7 kg)   Height: 5' 4\" (1.626 m)     Physical Examination: General appearance - alert, well appearing, and in no distress  Chest - clear to auscultation, no wheezes, rales or rhonchi, symmetric air entry  Heart - normal rate, regular rhythm, normal S1, S2, no murmurs, rubs, clicks or gallops  Skin - large hives with scabbed appearance and excoriation marks present on bilateral arms, lower legs, and neck     Assessment/ Plan:   Diagnoses and all orders for this visit:    1. Allergic reaction, initial encounter  -     methylPREDNISolone, PF, (SOLU-MEDROL) 125 mg/2 mL solr; 2 mL by IntraVENous route once for 1 dose. -     METHYLPREDNISOLONE INJECTION (Qty 2)  -     THER/PROPH/DIAG INJECTION, SUBCUT/IM  New rx. Cont Benadryl and steroid ointment at home as needed. Push fluids. 2. Insect bite, initial encounter  -     methylPREDNISolone, PF, (SOLU-MEDROL) 125 mg/2 mL solr; 2 mL by IntraVENous route once for 1 dose. -     METHYLPREDNISOLONE INJECTION (Qty 2)  -     THER/PROPH/DIAG INJECTION, SUBCUT/IM    Pt voiced understanding regarding plan of care. Follow-up Disposition:  Return if symptoms worsen or fail to improve. I have discussed the diagnosis with the patient and the intended plan as seen in the above orders.   The patient has received an after-visit summary and questions were answered concerning future plans.      Medication Side Effects and Warnings were discussed with patient    Concha Bryant NP

## 2018-04-19 NOTE — PATIENT INSTRUCTIONS
Insect Stings and Bites: Care Instructions  Your Care Instructions  Stings and bites from bees, wasps, ants, and other insects often cause pain, swelling, redness, and itching. In some people, especially children, the redness and swelling may be worse. It may extend several inches beyond the affected area. But in most cases, stings and bites don't cause reactions all over the body. If you have had a reaction to an insect sting or bite, you are at risk for a reaction if you get stung or bitten again. Follow-up care is a key part of your treatment and safety. Be sure to make and go to all appointments, and call your doctor if you are having problems. It's also a good idea to know your test results and keep a list of the medicines you take. How can you care for yourself at home? · Do not scratch or rub the skin where the sting or bite occurred. · Put a cold pack or ice cube on the area. Put a thin cloth between the ice and your skin. For some people, a paste of baking soda mixed with a little water helps relieve pain and decrease the reaction. · Take an over-the-counter antihistamine, such as diphenhydramine (Benadryl) or loratadine (Claritin), to relieve swelling, redness, and itching. Calamine lotion or hydrocortisone cream may also help. Do not give antihistamines to your child unless you have checked with the doctor first.  · Be safe with medicines. If your doctor prescribed medicine for your allergy, take it exactly as prescribed. Call your doctor if you think you are having a problem with your medicine. You will get more details on the specific medicines your doctor prescribes. · Your doctor may prescribe a shot of epinephrine to carry with you in case you have a severe reaction. Learn how and when to give yourself the shot, and keep it with you at all times. Make sure it has not . · Go to the emergency room anytime you have a severe reaction.  Go even if you have given yourself epinephrine and are feeling better. Symptoms can come back. When should you call for help? Call 911 anytime you think you may need emergency care. For example, call if:  ? · You have symptoms of a severe allergic reaction. These may include:  ¨ Sudden raised, red areas (hives) all over your body. ¨ Swelling of the throat, mouth, lips, or tongue. ¨ Trouble breathing. ¨ Passing out (losing consciousness). Or you may feel very lightheaded or suddenly feel weak, confused, or restless. ?Call your doctor now or seek immediate medical care if:  ? · You have symptoms of an allergic reaction not right at the sting or bite, such as:  ¨ A rash or small area of hives (raised, red areas on the skin). ¨ Itching. ¨ Swelling. ¨ Belly pain, nausea, or vomiting. ? · You have a lot of swelling around the site (such as your entire arm or leg is swollen). ? · You have signs of infection, such as:  ¨ Increased pain, swelling, redness, or warmth around the sting. ¨ Red streaks leading from the area. ¨ Pus draining from the sting. ¨ A fever. ? Watch closely for changes in your health, and be sure to contact your doctor if:  ? · You do not get better as expected. Where can you learn more? Go to http://anish-andrews.info/. Enter P390 in the search box to learn more about \"Insect Stings and Bites: Care Instructions. \"  Current as of: March 20, 2017  Content Version: 11.4  © 6307-9479 [employer]. Care instructions adapted under license by [employer] (which disclaims liability or warranty for this information). If you have questions about a medical condition or this instruction, always ask your healthcare professional. [place] [address] any warranty or liability for your use of this information.

## 2018-04-19 NOTE — MR AVS SNAPSHOT
[address] [address] [phone] Patient: Armando Stone MRN: [id number] VZS:2/35/4403 Visit Information Date & Time Provider Department Dept. Phone Encounter #  
 4/19/2018  4:00 PM Eliza Deal NP [address] [facility] [phone] [id number] Follow-up Instructions Return if symptoms worsen or fail to improve. Your Appointments 4/19/2018  4:00 PM  
ESTABLISHED PATIENT with Eliza Deal NP [address] [facility] ([address]) Appt Note: Pt has possible spider bites [address] [address] [phone]  
  
   
 [address] [place] [address] Road [address] Upcoming Health Maintenance Date Due Pneumococcal 19-64 Medium Risk (1 of 1 - PPSV23) 6/30/1977 DTaP/Tdap/Td series (1 - Tdap) 6/30/1979 EYE EXAM RETINAL OR DILATED Q1 8/18/2016 FOOT EXAM Q1 11/28/2017 COLONOSCOPY 1/1/2018 MEDICARE YEARLY EXAM 3/28/2018 HEMOGLOBIN A1C Q6M 9/8/2018 MICROALBUMIN Q1 9/8/2018 BREAST CANCER SCRN MAMMOGRAM 12/29/2018 LIPID PANEL Q1 3/8/2019 Allergies as of 4/19/2018  Review Complete On: 4/19/2018 By: Óscar Armstrong LPN No Known Allergies Current Immunizations  Reviewed on 11/23/2015 Name Date Influenza Vaccine 9/12/2017, 10/29/2013 Influenza Vaccine Mikayla Prasad) 12/5/2014 Influenza Vaccine (Quad) PF 11/28/2016, 11/23/2015 Influenza Vaccine Whole 10/1/2011 Pneumococcal Conjugate (PCV-13) 9/12/2017 Not reviewed this visit You Were Diagnosed With   
  
 Codes Comments Allergic reaction, initial encounter    -  Primary ICD-10-CM: T78.40XA ICD-9-CM: 995.3 Insect bite, initial encounter     ICD-10-CM: W57. Peter Scott ICD-9-CM: 919.4, E906.4 Vitals BP Pulse Temp Resp Height(growth percentile) Weight(growth percentile) 121/87 86 99 °F (37.2 °C) (Oral) 16 5' 4\" (1.626 m) 169 lb (76.7 kg) LMP SpO2 BMI OB Status Smoking Status (LMP Unknown) 97% 29.01 kg/m2 Hysterectomy Never Smoker BMI and BSA Data Body Mass Index Body Surface Area  
 29.01 kg/m 2 1.86 m 2 Preferred Pharmacy Pharmacy Name Phone 310 [facility] OF [address] [place] [address] ([address] [phone] Your Updated Medication List  
  
   
This list is accurate as of 4/19/18  3:51 PM.  Always use your most recent med list.  
  
  
  
  
 ACCU-CHEK LUCY PLUS TEST STRP strip Generic drug:  glucose blood VI test strips TEST TWO TIMES DAILY  
  
 BD Single Use Swabs Regular Padm Generic drug:  alcohol swabs USE WITH BLOOD SUGAR CHECKS  
  
 canagliflozin 100 mg tablet Commonly known as:  Keila Linton Take 2 Tabs by mouth Daily (before breakfast). cholecalciferol (vitamin D3) 2,000 unit Tab Take  by mouth. DULoxetine 30 mg capsule Commonly known as:  CYMBALTA TAKE 1 CAPSULE EVERY DAY  
  
 gabapentin 300 mg capsule Commonly known as:  NEURONTIN  
TAKE 1 CAPSULE THREE TIMES DAILY  
  
 hydroCHLOROthiazide 12.5 mg tablet Commonly known as:  HYDRODIURIL  
TAKE 1 TABLET EVERY DAY Insulin Needles (Disposable) 31 gauge x 5/16\" Ndle Once a day injection with Levemir LEVEMIR FLEXTOUCH U-100 INSULN 100 unit/mL (3 mL) Inpn Generic drug:  insulin detemir U-100 INJECT  40 UNITS SUBCUTANEOUSLY AT BEDTIME  
  
 lidocaine 2 % jelly Commonly known as:  XYLOCAINE  
APPLY A THIN FILM TO AFFECTED AREA ONLY(LOW BACK AND NECK ONLY) ONE TIME DAILY AS NEEDED LORazepam 0.5 mg tablet Commonly known as:  ATIVAN  
TAKE 1 TABLET BY MOUTH TWICE DAILY AS NEEDED FOR ANXIETY  
  
 meloxicam 15 mg tablet Commonly known as:  MOBIC  
TAKE 1 TABLET EVERY DAY  
  
 metFORMIN  mg tablet Commonly known as:  GLUCOPHAGE XR  
TAKE 2 TABLETS DAILY WITH BREAKFAST AND DINNER  
  
 methylPREDNISolone (PF) 125 mg/2 mL Solr Commonly known as:  SOLU-MEDROL 2 mL by IntraVENous route once for 1 dose. mometasone 0.1 % topical cream  
Commonly known as:  ELOCON  
APPLY TO THE AFFECTED AREA TWICE A DAY AS NEEDED FOR ALLERGIC RASH  
  
 montelukast 10 mg tablet Commonly known as:  SINGULAIR  
TAKE 1 TABLET EVERY DAY  
  
 mupirocin 2 % ointment Commonly known as:  [facility] Apply  to affected area two (2) times a day. As needed for skin infection  
  
 naloxone 2 mg/actuation Spry Use 1 spray intranasally into 1 nostril. Use a new Narcan nasal spray for subsequent doses and administer into alternating nostrils. May repeat every 2 to 3 minutes as needed. rosuvastatin 5 mg tablet Commonly known as:  CRESTOR  
TAKE 1 TABLET EVERY NIGHT  
  
 SUMAtriptan 100 mg tablet Commonly known as:  IMITREX Take 1 Tab by mouth once as needed for Migraine for up to 1 dose. topiramate 25 mg tablet Commonly known as:  TOPAMAX TAKE 2 TABLETS BY MOUTH EVERY NIGHT AT BEDTIME  
  
 traMADol 50 mg tablet Commonly known as:  ULTRAM  
TAKE 1 TABLET BY MOUTH TWICE DAILY AS NEEDED FOR PAIN - must last 30 days  
  
 undecylenic acid-chloroxylenol 25-3 % Soln  
by Apply Externally route. We Performed the Following METHYLPREDNISOLONE INJECTION [ [facility]] [place] THER/PROPH/DIAG INJECTION, SUBCUT/IM [id number] CPT(R)] Follow-up Instructions Return if symptoms worsen or fail to improve. Patient Instructions Insect Stings and Bites: Care Instructions Your Care Instructions Stings and bites from bees, wasps, ants, and other insects often cause pain, swelling, redness, and itching. In some people, especially children, the redness and swelling may be worse. It may extend several inches beyond the affected area. But in most cases, stings and bites don't cause reactions all over the body.  
If you have had a reaction to an insect sting or bite, you are at risk for a reaction if you get stung or bitten again. Follow-up care is a key part of your treatment and safety. Be sure to make and go to all appointments, and call your doctor if you are having problems. It's also a good idea to know your test results and keep a list of the medicines you take. How can you care for yourself at home? · Do not scratch or rub the skin where the sting or bite occurred. · Put a cold pack or ice cube on the area. Put a thin cloth between the ice and your skin. For some people, a paste of baking soda mixed with a little water helps relieve pain and decrease the reaction. · Take an over-the-counter antihistamine, such as diphenhydramine (Benadryl) or loratadine (Claritin), to relieve swelling, redness, and itching. Calamine lotion or hydrocortisone cream may also help. Do not give antihistamines to your child unless you have checked with the doctor first. 
· Be safe with medicines. If your doctor prescribed medicine for your allergy, take it exactly as prescribed. Call your doctor if you think you are having a problem with your medicine. You will get more details on the specific medicines your doctor prescribes. · Your doctor may prescribe a shot of epinephrine to carry with you in case you have a severe reaction. Learn how and when to give yourself the shot, and keep it with you at all times. Make sure it has not . · Go to the emergency room anytime you have a severe reaction. Go even if you have given yourself epinephrine and are feeling better. Symptoms can come back. When should you call for help? Call 911 anytime you think you may need emergency care. For example, call if: 
? · You have symptoms of a severe allergic reaction. These may include: 
¨ Sudden raised, red areas (hives) all over your body. ¨ Swelling of the throat, mouth, lips, or tongue. ¨ Trouble breathing. ¨ Passing out (losing consciousness).  Or you may feel very lightheaded or suddenly feel weak, confused, or restless. ?Call your doctor now or seek immediate medical care if: 
? · You have symptoms of an allergic reaction not right at the sting or bite, such as: ¨ A rash or small area of hives (raised, red areas on the skin). ¨ Itching. ¨ Swelling. ¨ Belly pain, nausea, or vomiting. ? · You have a lot of swelling around the site (such as your entire arm or leg is swollen). ? · You have signs of infection, such as: 
¨ Increased pain, swelling, redness, or warmth around the sting. ¨ Red streaks leading from the area. ¨ Pus draining from the sting. ¨ A fever. ? Watch closely for changes in your health, and be sure to contact your doctor if: 
? · You do not get better as expected. Where can you learn more? Go to http://anishCrowdFlikandrews.info/. Enter P390 in the search box to learn more about \"Insect Stings and Bites: Care Instructions. \" Current as of: March 20, 2017 Content Version: 11.4 © 9750-0166 [employer]. Care instructions adapted under license by [employer] (which disclaims liability or warranty for this information). If you have questions about a medical condition or this instruction, always ask your healthcare professional. [address] any warranty or liability for your use of this information. Introducing [facility] & HEALTH SERVICES! Dear Robles Smiley: 
Thank you for requesting a [employer] account. Our records indicate that you already have an active [employer] account. You can access your account anytime at [email or web address] Did you know that you can access your hospital and ER discharge instructions at any time in [employer]? You can also review all of your test results from your hospital stay or ER visit. Additional Information If you have questions, please visit the Frequently Asked Questions section of the [employer] website at [email or web address] Remember, [email or web address] is NOT to be used for urgent needs. For medical emergencies, dial 911. Now available from your iPhone and Android! Please provide this summary of care documentation to your next provider. Your primary care clinician is listed as Faye Short. If you have any questions after today's visit, please call [phone].

## 2018-04-19 NOTE — PROGRESS NOTES
1. Have you been to the ER, urgent care clinic since your last visit? Hospitalized since your last visit? No    2. Have you seen or consulted any other health care providers outside of the [address] [place] [address] since your last visit? Include any pap smears or colon screening.  No    Chief Complaint   Patient presents with    Insect Bite     Bites all over body, x3 week

## 2018-05-14 ENCOUNTER — OFFICE VISIT (OUTPATIENT)
Dept: FAMILY MEDICINE CLINIC | Age: 58
End: 2018-05-14

## 2018-05-14 VITALS
BODY MASS INDEX: 28.88 KG/M2 | HEIGHT: 64 IN | SYSTOLIC BLOOD PRESSURE: 104 MMHG | RESPIRATION RATE: 16 BRPM | WEIGHT: 169.13 LBS | DIASTOLIC BLOOD PRESSURE: 72 MMHG | HEART RATE: 89 BPM | OXYGEN SATURATION: 99 % | TEMPERATURE: 98.3 F

## 2018-05-14 DIAGNOSIS — L25.9 CONTACT DERMATITIS, UNSPECIFIED CONTACT DERMATITIS TYPE, UNSPECIFIED TRIGGER: Primary | ICD-10-CM

## 2018-05-14 NOTE — MR AVS SNAPSHOT
[address] [place] [address] 
[phone] Patient: Araceli Mendez MRN: [id number]:[id number] Visit Information Date & Time Provider Department Dept. Phone Encounter #  
 5/14/2018  7:15 AM Shameka Herron NP 5907 [facility] [phone] [id number] Upcoming Health Maintenance Date Due Pneumococcal 19-64 Medium Risk (1 of 1 - PPSV23) 6/30/1977 DTaP/Tdap/Td series (1 - Tdap) 6/30/1979 EYE EXAM RETINAL OR DILATED Q1 8/18/2016 FOOT EXAM Q1 11/28/2017 COLONOSCOPY 1/1/2018 MEDICARE YEARLY EXAM 3/28/2018 ZOSTER VACCINE AGE 60> 4/30/2018 Influenza Age 5 to Adult 8/1/2018 HEMOGLOBIN A1C Q6M 9/8/2018 MICROALBUMIN Q1 9/8/2018 BREAST CANCER SCRN MAMMOGRAM 12/29/2018 LIPID PANEL Q1 3/8/2019 Allergies as of 5/14/2018  Review Complete On: 5/14/2018 By: Shameka Herron NP No Known Allergies Current Immunizations  Reviewed on 11/23/2015 Name Date Influenza Vaccine 9/12/2017, 10/29/2013 Influenza Vaccine Selam Inch) 12/5/2014 Influenza Vaccine (Quad) PF 11/28/2016, 11/23/2015 Influenza Vaccine Whole 10/1/2011 Pneumococcal Conjugate (PCV-13) 9/12/2017 Not reviewed this visit You Were Diagnosed With   
  
 Codes Comments Contact dermatitis, unspecified contact dermatitis type, unspecified trigger    -  Primary ICD-10-CM: L25.9 ICD-9-CM: 692.9 Vitals BP Pulse Temp Resp Height(growth percentile) Weight(growth percentile) 104/72 89 98.3 °F (36.8 °C) (Oral) 16 5' 4\" (1.626 m) 169 lb 2 oz (76.7 kg) LMP SpO2 BMI OB Status Smoking Status (LMP Unknown) 99% 29.03 kg/m2 Hysterectomy Never Smoker Vitals History BMI and BSA Data Body Mass Index Body Surface Area  
 29.03 kg/m 2 1.86 m 2 Preferred Pharmacy Pharmacy Name Phone  [place] [address] AT [facility] OF [address] [facility] ([address] [phone] Your Updated Medication List  
  
   
This list is accurate as of 5/14/18  7:48 AM.  Always use your most recent med list.  
  
  
  
  
 ACCU-CHEK LUCY PLUS TEST STRP strip Generic drug:  glucose blood VI test strips TEST TWO TIMES DAILY  
  
 BD Single Use Swabs Regular Padm Generic drug:  alcohol swabs USE WITH BLOOD SUGAR CHECKS  
  
 canagliflozin 100 mg tablet Commonly known as:  Jasen Ramona Take 2 Tabs by mouth Daily (before breakfast). cholecalciferol (vitamin D3) 2,000 unit Tab Take  by mouth. DULoxetine 30 mg capsule Commonly known as:  CYMBALTA TAKE 1 CAPSULE EVERY DAY  
  
 gabapentin 300 mg capsule Commonly known as:  NEURONTIN  
TAKE 1 CAPSULE THREE TIMES DAILY  
  
 hydroCHLOROthiazide 12.5 mg tablet Commonly known as:  HYDRODIURIL  
TAKE 1 TABLET EVERY DAY Insulin Needles (Disposable) 31 gauge x 5/16\" Ndle Once a day injection with Levemir LEVEMIR FLEXTOUCH U-100 INSULN 100 unit/mL (3 mL) Inpn Generic drug:  insulin detemir U-100 INJECT  40 UNITS SUBCUTANEOUSLY AT BEDTIME  
  
 lidocaine 2 % jelly Commonly known as:  XYLOCAINE  
APPLY A THIN FILM TO AFFECTED AREA ONLY(LOW BACK AND NECK ONLY) ONE TIME DAILY AS NEEDED LORazepam 0.5 mg tablet Commonly known as:  ATIVAN  
TAKE 1 TABLET BY MOUTH TWICE DAILY AS NEEDED FOR ANXIETY  
  
 meloxicam 15 mg tablet Commonly known as:  MOBIC  
TAKE 1 TABLET EVERY DAY  
  
 metFORMIN  mg tablet Commonly known as:  GLUCOPHAGE XR  
TAKE 2 TABLETS DAILY WITH BREAKFAST AND DINNER  
  
 methylPREDNISolone (PF) 125 mg/2 mL Solr Commonly known as:  SOLU-MEDROL 2 mL by IntraVENous route once for 1 dose. mometasone 0.1 % topical cream  
Commonly known as:  ELOCON  
APPLY TO THE AFFECTED AREA TWICE A DAY AS NEEDED FOR ALLERGIC RASH  
  
 montelukast 10 mg tablet Commonly known as:  SINGULAIR  
TAKE 1 TABLET EVERY DAY  
  
 mupirocin 2 % ointment Commonly known as:  [facility] Apply  to affected area two (2) times a day. As needed for skin infection  
  
 naloxone 2 mg/actuation Spry Use 1 spray intranasally into 1 nostril. Use a new Narcan nasal spray for subsequent doses and administer into alternating nostrils. May repeat every 2 to 3 minutes as needed. rosuvastatin 5 mg tablet Commonly known as:  CRESTOR  
TAKE 1 TABLET EVERY NIGHT  
  
 SUMAtriptan 100 mg tablet Commonly known as:  IMITREX Take 1 Tab by mouth once as needed for Migraine for up to 1 dose. topiramate 25 mg tablet Commonly known as:  TOPAMAX TAKE 2 TABLETS BY MOUTH EVERY NIGHT AT BEDTIME  
  
 traMADol 50 mg tablet Commonly known as:  ULTRAM  
TAKE 1 TABLET BY MOUTH TWICE DAILY AS NEEDED FOR PAIN - must last 30 days  
  
 undecylenic acid-chloroxylenol 25-3 % Soln  
by Apply Externally route. We Performed the Following METHYLPREDNISOLONE INJECTION [ [facility]] [place] THER/PROPH/DIAG INJECTION, SUBCUT/IM [id number] CPT(R)] Introducing [facility] & [facility]! Dear Rea Tran: 
Thank you for requesting a [employer] account. Our records indicate that you already have an active [employer] account. You can access your account anytime at [email or web address] Did you know that you can access your hospital and ER discharge instructions at any time in [employer]? You can also review all of your test results from your hospital stay or ER visit. Additional Information If you have questions, please visit the Frequently Asked Questions section of the [employer] website at [email or web address] Remember, [employer] is NOT to be used for urgent needs. For medical emergencies, dial 911. Now available from your iPhone and Android! Please provide this summary of care documentation to your next provider. Your primary care clinician is listed as Gavin Groves.  If you have any questions after today's visit, please call [phone].

## 2018-05-14 NOTE — PROGRESS NOTES
1. Have you been to the ER, urgent care clinic since your last visit? Hospitalized since your last visit? No    2. Have you seen or consulted any other health care providers outside of the [address] [place] [address] since your last visit? Include any pap smears or colon screening. No    Chief Complaint   Patient presents with    Insect Bite     x 7 locations that itch, she has been seen for this before     Pt states she has been seen for insect bites before but she now has 7 new locations and they itch.

## 2018-05-14 NOTE — PROGRESS NOTES
HISTORY OF PRESENT ILLNESS  Shola Baumann is a 59 y.o. female. HPI  Random bites of the right ankle, right hand and buttocks  Not sure if mosquito in nature or bedbug issue  Bought new recliner and may be due to that as well  Has treated bed and furniture for bugs  Last time got solumedrol injection that helped, wants to do this again    ROS  A comprehensive review of system was obtained and negative except findings in the HPI    Visit Vitals    /72    Pulse 89    Temp 98.3 °F (36.8 °C) (Oral)    Resp 16    Ht 5' 4\" (1.626 m)    Wt 169 lb 2 oz (76.7 kg)    LMP  (LMP Unknown)    SpO2 99%    BMI 29.03 kg/m2     Physical Exam   Constitutional: She is oriented to person, place, and time. She appears well-developed and well-nourished. Neck: No JVD present. Cardiovascular: Normal rate, regular rhythm and intact distal pulses. Exam reveals no gallop and no friction rub. No murmur heard. Pulmonary/Chest: Effort normal and breath sounds normal. No respiratory distress. She has no wheezes. Musculoskeletal: She exhibits no edema. Neurological: She is alert and oriented to person, place, and time. Skin: Skin is warm. Bite areas, red and inflammed but no head formed like infection of the side of the right hand and lateral right ankle   Nursing note and vitals reviewed. ASSESSMENT and PLAN  Encounter Diagnoses   Name Primary?  Contact dermatitis, unspecified contact dermatitis type, unspecified trigger Yes     Orders Placed This Encounter    methylPREDNISolone, PF, (SOLU-MEDROL) 125 mg/2 mL solr     Reviewed treatment for bedbugs etc  Shot given  Follow up prn    I have discussed the diagnosis with the patient and the intended plan as seen in the above orders. The patient has received an after-visit summary and questions were answered concerning future plans. Patient conveyed understanding of the plan at the time of the visit.     Elvin Paget, MSN, ANP  5/14/2018

## 2018-06-11 ENCOUNTER — OFFICE VISIT (OUTPATIENT)
Dept: FAMILY MEDICINE CLINIC | Age: 58
End: 2018-06-11

## 2018-06-11 VITALS
BODY MASS INDEX: 29.37 KG/M2 | TEMPERATURE: 98.7 F | RESPIRATION RATE: 16 BRPM | OXYGEN SATURATION: 97 % | SYSTOLIC BLOOD PRESSURE: 119 MMHG | HEIGHT: 64 IN | DIASTOLIC BLOOD PRESSURE: 82 MMHG | WEIGHT: 172 LBS | HEART RATE: 82 BPM

## 2018-06-11 DIAGNOSIS — G47.10 HYPERSOMNIA: ICD-10-CM

## 2018-06-11 DIAGNOSIS — R53.83 FATIGUE, UNSPECIFIED TYPE: ICD-10-CM

## 2018-06-11 DIAGNOSIS — S80.861A INSECT BITE OF RIGHT LOWER EXTREMITY, INITIAL ENCOUNTER: Primary | ICD-10-CM
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
## 2018-06-11 RX ORDER — MOMETASONE FUROATE 1 MG/G
OINTMENT TOPICAL
Qty: 45 G | Refills: 2 | Status: SHIPPED | OUTPATIENT
Start: 2018-06-11 | End: 2018-09-18 | Stop reason: SDUPTHER

## 2018-06-11 NOTE — PROGRESS NOTES
1. Have you been to the ER, urgent care clinic since your last visit? Hospitalized since your last visit? No    2. Have you seen or consulted any other health care providers outside of the [address] [place] [address] since your last visit? Include any pap smears or colon screening.  No   Chief Complaint   Patient presents with    Insect Bite     Both Legs, x8 weeks

## 2018-06-11 NOTE — PROGRESS NOTES
Chief Complaint   Patient presents with    Insect Bite     Both Legs, x8 weeks     she is a 59y.o. year old female who presents for evalution. Pt here for F/U insect bites. Saw Derm who advised her to stop getting injections and only use topical cream.  Pt has been using topical cream but here today for refills. States when to sleep clinic and was told did not have sleep apnea. Recommend pt have iron levels and TSH checked. Pt requesting 3 month refills on medications today, advised that walk-in appointment are not for controlled substance refills. Pt was also told at  before being brought back by staff. Reviewed PmHx, RxHx, FmHx, SocHx, AllgHx and updated and dated in the chart. Review of Systems - negative except as listed above in the HPI    Objective:     Vitals:    06/11/18 0722   BP: 119/82   Pulse: 82   Resp: 16   Temp: 98.7 °F (37.1 °C)   TempSrc: Oral   SpO2: 97%   Weight: 172 lb (78 kg)   Height: 5' 4\" (1.626 m)     Physical Examination: General appearance - alert, well appearing, and in no distress  Chest - clear to auscultation, no wheezes, rales or rhonchi, symmetric air entry  Heart - normal rate, regular rhythm, normal S1, S2, no murmurs, rubs, clicks or gallops  Skin - insect bites lower legs bilaterally     Assessment/ Plan:   Diagnoses and all orders for this visit:    1. Insect bite of right lower extremity, initial encounter  -     mometasone (ELOCON) 0.1 % ointment; Apply  to affected area two (2) times daily as needed for Skin Irritation. Stable, refills provided. 2. Fatigue, unspecified type  -     TSH 3RD GENERATION  -     CBC WITH AUTOMATED DIFF  Pt left up set before getting labs done. 3. Hypersomnia  -     TSH 3RD GENERATION  -     CBC WITH AUTOMATED DIFF     Pt upset since will not provide her with refills during walk-in appointments. Told me Rhrehana Felix would have done it! \" and then tells me she will find a new PCP office to use.   Pt also stopped at  on way out to complain none of us were doing our fucking jobs. Was going to discuss with Piedad Carson and have her call in 1 mo refills for prescriptions as courtesy for patient but instead will make Piedad Carson aware of these issues. Pt voiced understanding regarding plan of care. Follow-up Disposition:  Return if symptoms worsen or fail to improve. I have discussed the diagnosis with the patient and the intended plan as seen in the above orders. The patient has received an after-visit summary and questions were answered concerning future plans.      Medication Side Effects and Warnings were discussed with patient    Levi Torres NP

## 2018-06-13 ENCOUNTER — OFFICE VISIT (OUTPATIENT)
Dept: FAMILY MEDICINE CLINIC | Age: 58
End: 2018-06-13

## 2018-06-13 VITALS
SYSTOLIC BLOOD PRESSURE: 114 MMHG | OXYGEN SATURATION: 98 % | WEIGHT: 171 LBS | RESPIRATION RATE: 16 BRPM | DIASTOLIC BLOOD PRESSURE: 80 MMHG | TEMPERATURE: 98.2 F | HEART RATE: 68 BPM | HEIGHT: 64 IN | BODY MASS INDEX: 29.19 KG/M2

## 2018-06-13 DIAGNOSIS — Z00.00 WELL ADULT EXAM: Primary | ICD-10-CM

## 2018-06-13 DIAGNOSIS — Z79.4 CONTROLLED TYPE 2 DIABETES MELLITUS WITHOUT COMPLICATION, WITH LONG-TERM CURRENT USE OF INSULIN (HCC): ICD-10-CM

## 2018-06-13 DIAGNOSIS — M51.36 DDD (DEGENERATIVE DISC DISEASE), LUMBAR: ICD-10-CM

## 2018-06-13 DIAGNOSIS — F41.9 ANXIETY: ICD-10-CM

## 2018-06-13 DIAGNOSIS — E11.9 CONTROLLED TYPE 2 DIABETES MELLITUS WITHOUT COMPLICATION, WITH LONG-TERM CURRENT USE OF INSULIN (HCC): ICD-10-CM

## 2018-06-13 RX ORDER — TRAMADOL HYDROCHLORIDE 50 MG/1
TABLET ORAL
Qty: 60 TAB | Refills: 2 | Status: SHIPPED | OUTPATIENT
Start: 2018-06-13 | End: 2018-09-17 | Stop reason: SDUPTHER

## 2018-06-13 RX ORDER — LORAZEPAM 0.5 MG/1
TABLET ORAL
Qty: 60 TAB | Refills: 2 | Status: SHIPPED | OUTPATIENT
Start: 2018-06-13 | End: 2018-09-17 | Stop reason: SDUPTHER

## 2018-06-13 RX ORDER — NYSTATIN 100000 U/G
CREAM TOPICAL
Qty: 30 G | Refills: 2 | Status: SHIPPED | OUTPATIENT
Start: 2018-06-13 | End: 2018-11-13 | Stop reason: SDUPTHER

## 2018-06-13 NOTE — MR AVS SNAPSHOT
[address] [place] [address] 
[phone] Patient: Armando Stone MRN: [id number]:[id number] Visit Information Date & Time Provider Department Dept. Phone Encounter #  
 6/13/2018 11:00 AM Mel Katz NP [address] [facility] [phone] [id number] Upcoming Health Maintenance Date Due Pneumococcal 19-64 Medium Risk (1 of 1 - PPSV23) 6/30/1977 DTaP/Tdap/Td series (1 - Tdap) 6/30/1979 EYE EXAM RETINAL OR DILATED Q1 8/18/2016 FOOT EXAM Q1 11/28/2017 COLONOSCOPY 1/1/2018 ZOSTER VACCINE AGE 60> 4/30/2018 Influenza Age 5 to Adult 8/1/2018 HEMOGLOBIN A1C Q6M 9/8/2018 MICROALBUMIN Q1 9/8/2018 BREAST CANCER SCRN MAMMOGRAM 12/29/2018 LIPID PANEL Q1 3/8/2019 Allergies as of 6/13/2018  Review Complete On: 6/13/2018 By: Bryna Canavan, LPN No Known Allergies Current Immunizations  Reviewed on 11/23/2015 Name Date Influenza Vaccine 9/12/2017, 10/29/2013 Influenza Vaccine Mikayla Prasad) 12/5/2014 Influenza Vaccine (Quad) PF 11/28/2016, 11/23/2015 Influenza Vaccine Whole 10/1/2011 Pneumococcal Conjugate (PCV-13) 9/12/2017 Not reviewed this visit You Were Diagnosed With   
  
 Codes Comments Well adult exam    -  Primary ICD-10-CM: Z00.00 ICD-9-CM: V70.0   
 DDD (degenerative disc disease), lumbar     ICD-10-CM: M51.36 
ICD-9-CM: 722.52 Controlled type 2 diabetes mellitus without complication, with long-term current use of insulin (HCC)     ICD-10-CM: E11.9, Z79.4 ICD-9-CM: 250.00, V58.67 Vitals BP Pulse Temp Resp Height(growth percentile) Weight(growth percentile) 114/80 68 98.2 °F (36.8 °C) (Oral) 16 5' 4\" (1.626 m) 171 lb (77.6 kg) LMP SpO2 BMI OB Status Smoking Status (LMP Unknown) 98% 29.35 kg/m2 Hysterectomy Never Smoker Vitals History BMI and BSA Data  Body Mass Index Body Surface Area  
 29.35 kg/m 2 1.87 m 2  
  
  
 Preferred Pharmacy Pharmacy Name Phone [facility], [place] - [address] [phone] Your Updated Medication List  
  
   
This list is accurate as of 6/13/18 11:37 AM.  Always use your most recent med list.  
  
  
  
  
 ACCU-CHEK LUCY PLUS TEST STRP strip Generic drug:  glucose blood VI test strips TEST TWO TIMES DAILY  
  
 BD Single Use Swabs Regular Padm Generic drug:  alcohol swabs USE WITH BLOOD SUGAR CHECKS  
  
 canagliflozin 100 mg tablet Commonly known as:  Klarissa Carlos Take 2 Tabs by mouth Daily (before breakfast). cholecalciferol (vitamin D3) 2,000 unit Tab Take  by mouth. DULoxetine 30 mg capsule Commonly known as:  CYMBALTA TAKE 1 CAPSULE EVERY DAY  
  
 gabapentin 300 mg capsule Commonly known as:  NEURONTIN  
TAKE 1 CAPSULE THREE TIMES DAILY  
  
 hydroCHLOROthiazide 12.5 mg tablet Commonly known as:  HYDRODIURIL  
TAKE 1 TABLET EVERY DAY Insulin Needles (Disposable) 31 gauge x 5/16\" Ndle Once a day injection with Levemir LEVEMIR FLEXTOUCH U-100 INSULN 100 unit/mL (3 mL) Inpn Generic drug:  insulin detemir U-100 INJECT  40 UNITS SUBCUTANEOUSLY AT BEDTIME  
  
 lidocaine 2 % jelly Commonly known as:  XYLOCAINE  
APPLY A THIN FILM TO AFFECTED AREA ONLY(LOW BACK AND NECK ONLY) ONE TIME DAILY AS NEEDED LORazepam 0.5 mg tablet Commonly known as:  ATIVAN  
TAKE 1 TABLET BY MOUTH TWICE DAILY AS NEEDED FOR ANXIETY  
  
 meloxicam 15 mg tablet Commonly known as:  MOBIC  
TAKE 1 TABLET EVERY DAY  
  
 metFORMIN  mg tablet Commonly known as:  GLUCOPHAGE XR  
TAKE 2 TABLETS DAILY WITH BREAKFAST AND DINNER  
  
 mometasone 0.1 % ointment Commonly known as:  Valentine Done Apply  to affected area two (2) times daily as needed for Skin Irritation. montelukast 10 mg tablet Commonly known as:  SINGULAIR  
TAKE 1 TABLET EVERY DAY  
  
 mupirocin 2 % ointment Commonly known as:  [facility]  
 Apply  to affected area two (2) times a day. As needed for skin infection  
  
 naloxone 2 mg/actuation Spry Use 1 spray intranasally into 1 nostril. Use a new Narcan nasal spray for subsequent doses and administer into alternating nostrils. May repeat every 2 to 3 minutes as needed. nystatin topical cream  
Commonly known as:  MYCOSTATIN Apply  to affected area two (2) times daily as needed for Skin Irritation. rosuvastatin 5 mg tablet Commonly known as:  CRESTOR  
TAKE 1 TABLET EVERY NIGHT  
  
 SUMAtriptan 100 mg tablet Commonly known as:  IMITREX Take 1 Tab by mouth once as needed for Migraine for up to 1 dose. topiramate 25 mg tablet Commonly known as:  TOPAMAX TAKE 2 TABLETS BY MOUTH EVERY NIGHT AT BEDTIME  
  
 traMADol 50 mg tablet Commonly known as:  ULTRAM  
TAKE 1 TABLET BY MOUTH TWICE DAILY AS NEEDED FOR PAIN - must last 30 days  
  
 undecylenic acid-chloroxylenol 25-3 % Soln  
by Apply Externally route. Prescriptions Printed Refills  
 traMADol (ULTRAM) 50 mg tablet 2 Sig: TAKE 1 TABLET BY MOUTH TWICE DAILY AS NEEDED FOR PAIN - must last 30 days Class: Print Prescriptions Sent to Pharmacy Refills  
 nystatin (MYCOSTATIN) topical cream 2 Sig: Apply  to affected area two (2) times daily as needed for Skin Irritation. Class: Normal  
 Pharmacy: [address] [place] [address], [address] [place] [address] Ph #: [phone] Route: Topical  
  
We Performed the Following CBC WITH AUTOMATED DIFF [34544 CPT(R)] HEMOGLOBIN A1C WITH EAG [71681 CPT(R)] LIPID PANEL [21035 CPT(R)] METABOLIC PANEL, COMPREHENSIVE [26520 CPT(R)] MICROALBUMIN, UR, RAND W/ MICROALB/CREAT RATIO [id number] CPT(R)] TSH 3RD GENERATION [28261 CPT(R)] Introducing [facility]! Dear Alen Gore: 
Thank you for requesting a [employer] account.   Our records indicate that you already have an active [employer] account. You can access your account anytime at [email or web address] Did you know that you can access your hospital and ER discharge instructions at any time in Chorus? You can also review all of your test results from your hospital stay or ER visit. Additional Information If you have questions, please visit the Frequently Asked Questions section of the Ch[employer] website at [email or web address] Remember, Chorus is NOT to be used for urgent needs. For medical emergencies, dial 911. Now available from your iPhone and Android! Please provide this summary of care documentation to your next provider. Your primary care clinician is listed as Donna David. If you have any questions after today's visit, please call [phone].

## 2018-06-13 NOTE — PATIENT INSTRUCTIONS
Medicare Wellness Visit, Female    The best way to live healthy is to have a lifestyle where you eat a well-balanced diet, exercise regularly, limit alcohol use, and quit all forms of tobacco/nicotine, if applicable. Regular preventive services are another way to keep healthy. Preventive services (vaccines, screening tests, monitoring & exams) can help personalize your care plan, which helps you manage your own care. Screening tests can find health problems at the earliest stages, when they are easiest to treat. [facility] follows the current, evidence-based guidelines published by the [facility] and [facility] ([facility]) when recommending preventive services for our patients. Because we follow these guidelines, sometimes recommendations change over time as research supports it. (For example, mammograms used to be recommended annually. Even though Medicare will still pay for an annual mammogram, the newer guidelines recommend a mammogram every two years for women of average risk.)    Of course, you and your provider may decide to screen more often for some diseases, based on your risk and co-morbidities (chronic disease you are already diagnosed with). Preventive services for you include:    - Medicare offers their members a free annual wellness visit, which is time for you and your primary care provider to discuss and plan for your preventive service needs. Take advantage of this benefit every year!    -All people over age 72 should receive the recommended pneumonia vaccines. Current USPSTF guidelines recommend a series of two vaccines for the best pneumonia protection.     -All adults should have a yearly flu vaccine and a tetanus vaccine every 10 years. All adults age 61 years should receive a shingles vaccine once in their lifetime.      -A bone mass density test is recommended when a woman turns 65 to screen for osteoporosis.  This test is only recommended once as a screening. Some providers will use this same test as a disease monitoring tool if you already have osteoporosis. -All adults age 38-68 years who are overweight should have a diabetes screening test once every three years.     -Other screening tests & preventive services for persons with diabetes include: an eye exam to screen for diabetic retinopathy, a kidney function test, a foot exam, and stricter control over your cholesterol.     -Cardiovascular screening for adults with routine risk involves an electrocardiogram (ECG) at intervals determined by the provider.     -Colorectal cancer screenings should be done for adults age 54-65 years with normal risk. There are a number of acceptable methods of screening for this type of cancer. Each test has its own benefits and drawbacks. Discuss with your provider what is most appropriate for you during your annual wellness visit. The different tests include: colonoscopy (considered the best screening method), a fecal occult blood test, a fecal DNA test, and sigmoidoscopy. -Breast cancer screenings are recommended every other year for women of normal risk age 54-69 years.     -Cervical cancer screenings for women over age 72 are only recommended with certain risk factors.     -All adults born between [facility] should be screened once for Hepatitis C.      Here is a list of your current Health Maintenance items (your personalized list of preventive services) with a due date:  Health Maintenance Due   Topic Date Due    Pneumococcal Vaccine (1 of 1 - PPSV23) 06/30/1977    DTaP/Tdap/Td  (1 - Tdap) 06/30/1979    Eye Exam  08/18/2016    Diabetic Foot Care  11/28/2017    Colonoscopy  01/01/2018    Shingles Vaccine  04/30/2018

## 2018-06-13 NOTE — PROGRESS NOTES
1. Have you been to the ER, urgent care clinic since your last visit? Hospitalized since your last visit? Yes [facility] on 5/21/18 for bug bites    2. Have you seen or consulted any other health care providers outside of the [address] [place] [address] since your last visit? Include any pap smears or colon screening.  No    Chief Complaint   Patient presents with    Follow-up     3 mo f/u    Labs     fasting    Medication Refill

## 2018-06-13 NOTE — PROGRESS NOTES
This is the Subsequent Medicare Annual Wellness Exam, performed 12 months or more after the Initial AWV or the last Subsequent AWV  I have reviewed the patient's medical history in detail and updated the computerized patient record. History     Past Medical History:   Diagnosis Date    Arrhythmia     \"IRREG HEARTBEAT\"  (DR MARTINEZ)    Arthritis     Chronic pain     BACK    Depression     Diabetes ([address]     Dyspepsia and other specified disorders of function of stomach     GERD (gastroesophageal reflux disease)     Headache     Headache(784.0)     Hypertension     Hyperthyroidism     Morbid obesity (HCC)     Psychiatric disorder     DEPRESSION    Sleep apnea     no CPAP    Stool color black     Sun-damaged skin     Tanning bed exposure       Past Surgical History:   Procedure Laterality Date    HAND/FINGER SURGERY UNLISTED      x3    HX BACK SURGERY      x4- 2001,2003,2009,2011    HX CHOLECYSTECTOMY  2002    HX GASTRIC BYPASS  5/2008    LAP BAND ( RESHMA RODRIGUEZ)    HX HEENT  2006    LASIK BILAT    HX HYSTERECTOMY  2005    HX ORTHOPAEDIC  2006,2010    HANDS  RIGHT X2; LEFT X1    HX TONSILLECTOMY  1964    HX TUBAL LIGATION  5910,[id number] REV    x2 and a reversal    HX UROLOGICAL  04/29/2015    urethra sling    LAP, PLACE ADJUST GASTR BAND  5/2008     Current Outpatient Prescriptions   Medication Sig Dispense Refill    traMADol (ULTRAM) 50 mg tablet TAKE 1 TABLET BY MOUTH TWICE DAILY AS NEEDED FOR PAIN - must last 30 days 60 Tab 2    nystatin (MYCOSTATIN) topical cream Apply  to affected area two (2) times daily as needed for Skin Irritation. 30 g 2    LORazepam (ATIVAN) 0.5 mg tablet TAKE 1 TABLET BY MOUTH TWICE DAILY AS NEEDED FOR ANXIETY - must last 30 days 60 Tab 2    mometasone (ELOCON) 0.1 % ointment Apply  to affected area two (2) times daily as needed for Skin Irritation.  45 g 2    BD SINGLE USE SWABS REGULAR padm USE WITH BLOOD SUGAR CHECKS 200 Pad 3    montelukast (SINGULAIR) 10 mg tablet TAKE 1 TABLET EVERY DAY 90 Tab 3    ACCU-CHEK LUCY PLUS TEST STRP strip TEST TWO TIMES DAILY 200 Strip 3    naloxone 2 mg/actuation spry Use 1 spray intranasally into 1 nostril. Use a new Narcan nasal spray for subsequent doses and administer into alternating nostrils. May repeat every 2 to 3 minutes as needed. 1 Each 0    topiramate (TOPAMAX) 25 mg tablet TAKE 2 TABLETS BY MOUTH EVERY NIGHT AT BEDTIME 60 Tab 0    metFORMIN ER (GLUCOPHAGE XR) 500 mg tablet TAKE 2 TABLETS DAILY WITH BREAKFAST AND DINNER 360 Tab 3    canagliflozin (INVOKANA) 100 mg tablet Take 2 Tabs by mouth Daily (before breakfast). 180 Tab 1    DULoxetine (CYMBALTA) 30 mg capsule TAKE 1 CAPSULE EVERY DAY 90 Cap 3    gabapentin (NEURONTIN) 300 mg capsule TAKE 1 CAPSULE THREE TIMES DAILY 270 Cap 3    hydroCHLOROthiazide (HYDRODIURIL) 12.5 mg tablet TAKE 1 TABLET EVERY DAY 90 Tab 3    rosuvastatin (CRESTOR) 5 mg tablet TAKE 1 TABLET EVERY NIGHT 90 Tab 3    LEVEMIR FLEXTOUCH 100 unit/mL (3 mL) inpn INJECT  40 UNITS SUBCUTANEOUSLY AT BEDTIME 45 mL 3    meloxicam (MOBIC) 15 mg tablet TAKE 1 TABLET EVERY DAY 90 Tab 3    lidocaine (XYLOCAINE) 2 % jelly APPLY A THIN FILM TO AFFECTED AREA ONLY(LOW BACK AND NECK ONLY) ONE TIME DAILY AS NEEDED 30 mL 1    Insulin Needles, Disposable, 31 gauge x 5/16\" ndle Once a day injection with Levemir 15 Package 3    SUMAtriptan (IMITREX) 100 mg tablet Take 1 Tab by mouth once as needed for Migraine for up to 1 dose. 12 Tab 6    undecylenic acid-chloroxylenol 25-3 % soln by Apply Externally route.  cholecalciferol, vitamin D3, 2,000 unit tab Take  by mouth.  mupirocin (BACTROBAN) 2 % ointment Apply  to affected area two (2) times a day.  As needed for skin infection 22 g 0     No Known Allergies  Family History   Problem Relation Age of Onset    Post-op Nausea/Vomiting Mother     Delayed Awakening Mother     Psychiatric Disorder Mother     Hypertension Mother     Stroke Mother     Headache Mother     Post-op Nausea/Vomiting Daughter     Delayed Awakening Daughter     Cancer Father     Hypertension Father     Cancer Maternal Grandmother     Psychiatric Disorder Maternal Grandmother     Dementia Paternal Grandmother     Dementia Paternal Grandfather     Parkinson's Disease Paternal Grandfather      Social History   Substance Use Topics    Smoking status: Never Smoker    Smokeless tobacco: Never Used    Alcohol use No     Patient Active Problem List   Diagnosis Code    Lumbar stenosis M48.061    GERD (gastroesophageal reflux disease) K21.9    Dysphagia R13.10    Status post gastric banding Z98.84    DDD (degenerative disc disease), lumbar M51.36    Depression F32.9    Diverticulosis K57.90    NICOLE (obstructive sleep apnea) G47.33    Diabetes mellitus type 2, controlled ([address] E11.9    Urinary frequency R35.0    Advanced care planning/counseling discussion Z71.89       Depression Risk Factor Screening:   No flowsheet data found. Alcohol Risk Factor Screening: You do not drink alcohol or very rarely. Functional Ability and Level of Safety:   Hearing Loss  Hearing is good. Activities of Daily Living  The home contains: no safety equipment. Patient does total self care    Fall Risk  No flowsheet data found.     Abuse Screen  Patient is not abused    Cognitive Screening   Evaluation of Cognitive Function:  Has your family/caregiver stated any concerns about your memory: no  Normal    Patient Care Team   Patient Care Team:  Ruma Simeon, NP as PCP - General (Family Practice)  Soto Bales MD (Neurology)    Visit Vitals    /80    Pulse 68    Temp 98.2 °F (36.8 °C) (Oral)    Resp 16    Ht 5' 4\" (1.626 m)    Wt 171 lb (77.6 kg)    LMP  (LMP Unknown)    SpO2 98%    BMI 29.35 kg/m2       Assessment/Plan   Education and counseling provided:  Are appropriate based on today's review and evaluation    Diagnoses and all orders for this visit: 1. Well adult exam  -     CBC WITH AUTOMATED DIFF  -     LIPID PANEL  -     METABOLIC PANEL, COMPREHENSIVE  -     TSH 3RD GENERATION    2. DDD (degenerative disc disease), lumbar  -     traMADol (ULTRAM) 50 mg tablet; TAKE 1 TABLET BY MOUTH TWICE DAILY AS NEEDED FOR PAIN - must last 30 days    3. Controlled type 2 diabetes mellitus without complication, with long-term current use of insulin (HCC)  -     HEMOGLOBIN A1C WITH EAG  -     MICROALBUMIN, UR, RAND W/ MICROALB/CREAT RATIO    4. Anxiety  -     LORazepam (ATIVAN) 0.5 mg tablet; TAKE 1 TABLET BY MOUTH TWICE DAILY AS NEEDED FOR ANXIETY - must last 30 days    Other orders  -     nystatin (MYCOSTATIN) topical cream; Apply  to affected area two (2) times daily as needed for Skin Irritation. I have discussed the diagnosis with the patient and the intended plan as seen in the above orders. The patient has received an after-visit summary and questions were answered concerning future plans. Patient conveyed understanding of the plan at the time of the visit.     Александр Vora, MSN, ANP  6/13/2018

## 2018-06-14 LAB
ALBUMIN SERPL-MCNC: 4.3 G/DL (ref 3.5–5.5)
ALBUMIN/CREAT UR: 4.4 MG/G CREAT (ref 0–30)
ALBUMIN/GLOB SERPL: 1.8 {RATIO} (ref 1.2–2.2)
ALP SERPL-CCNC: 101 IU/L (ref 39–117)
ALT SERPL-CCNC: 15 IU/L (ref 0–32)
AST SERPL-CCNC: 19 IU/L (ref 0–40)
BASOPHILS # BLD AUTO: 0 X10E3/UL (ref 0–0.2)
BASOPHILS NFR BLD AUTO: 0 %
BILIRUB SERPL-MCNC: 1.3 MG/DL (ref 0–1.2)
BUN SERPL-MCNC: 22 MG/DL (ref 6–24)
BUN/CREAT SERPL: 25 (ref 9–23)
CALCIUM SERPL-MCNC: 9.8 MG/DL (ref 8.7–10.2)
CHLORIDE SERPL-SCNC: 103 MMOL/L (ref 96–106)
CHOLEST SERPL-MCNC: 114 MG/DL (ref 100–199)
CO2 SERPL-SCNC: 27 MMOL/L (ref 20–29)
CREAT SERPL-MCNC: 0.89 MG/DL (ref 0.57–1)
CREAT UR-MCNC: 126.2 MG/DL
EOSINOPHIL # BLD AUTO: 0.2 X10E3/UL (ref 0–0.4)
EOSINOPHIL NFR BLD AUTO: 3 %
ERYTHROCYTE [DISTWIDTH] IN BLOOD BY AUTOMATED COUNT: 13.5 % (ref 12.3–15.4)
EST. AVERAGE GLUCOSE BLD GHB EST-MCNC: 163 MG/DL
GFR SERPLBLD CREATININE-BSD FMLA CKD-EPI: 71 ML/MIN/1.73
GFR SERPLBLD CREATININE-BSD FMLA CKD-EPI: 82 ML/MIN/1.73
GLOBULIN SER CALC-MCNC: 2.4 G/DL (ref 1.5–4.5)
GLUCOSE SERPL-MCNC: 76 MG/DL (ref 65–99)
HBA1C MFR BLD: 7.3 % (ref 4.8–5.6)
HCT VFR BLD AUTO: 43.4 % (ref 34–46.6)
HDLC SERPL-MCNC: 56 MG/DL
HGB BLD-MCNC: 14.4 G/DL (ref 11.1–15.9)
IMM GRANULOCYTES # BLD: 0 X10E3/UL (ref 0–0.1)
IMM GRANULOCYTES NFR BLD: 0 %
INTERPRETATION, 910389: NORMAL
LDLC SERPL CALC-MCNC: 36 MG/DL (ref 0–99)
LYMPHOCYTES # BLD AUTO: 1.5 X10E3/UL (ref 0.7–3.1)
LYMPHOCYTES NFR BLD AUTO: 27 %
Lab: NORMAL
MCH RBC QN AUTO: 29.5 PG (ref 26.6–33)
MCHC RBC AUTO-ENTMCNC: 33.2 G/DL (ref 31.5–35.7)
MCV RBC AUTO: 89 FL (ref 79–97)
MICROALBUMIN UR-MCNC: 5.6 UG/ML
MONOCYTES # BLD AUTO: 0.4 X10E3/UL (ref 0.1–0.9)
MONOCYTES NFR BLD AUTO: 8 %
NEUTROPHILS # BLD AUTO: 3.3 X10E3/UL (ref 1.4–7)
NEUTROPHILS NFR BLD AUTO: 62 %
PLATELET # BLD AUTO: 208 X10E3/UL (ref 150–379)
POTASSIUM SERPL-SCNC: 4 MMOL/L (ref 3.5–5.2)
PROT SERPL-MCNC: 6.7 G/DL (ref 6–8.5)
RBC # BLD AUTO: 4.88 X10E6/UL (ref 3.77–5.28)
SODIUM SERPL-SCNC: 143 MMOL/L (ref 134–144)
TRIGL SERPL-MCNC: 109 MG/DL (ref 0–149)
TSH SERPL DL<=0.005 MIU/L-ACNC: 0.8 UIU/ML (ref 0.45–4.5)
VLDLC SERPL CALC-MCNC: 22 MG/DL (ref 5–40)
WBC # BLD AUTO: 5.4 X10E3/UL (ref 3.4–10.8)

## 2018-06-14 RX ORDER — PEN NEEDLE, DIABETIC 31 GX5/16"
NEEDLE, DISPOSABLE MISCELLANEOUS
Qty: 90 PEN NEEDLE | Refills: 3 | Status: SHIPPED | OUTPATIENT
Start: 2018-06-14 | End: 2019-03-31 | Stop reason: SDUPTHER

## 2018-06-14 RX ORDER — INSULIN DETEMIR 100 [IU]/ML
INJECTION, SOLUTION SUBCUTANEOUS
Qty: 45 ML | Refills: 3 | Status: SHIPPED | OUTPATIENT
Start: 2018-06-14 | End: 2019-03-20

## 2018-06-15 NOTE — PROGRESS NOTES
Hey there, your labs look fantastic for cholesterol, blood count, kidney and liver functions. Your sugar has gone up some so make sure you are watching your carb intake. Recheck 3 months.    Soto Miller

## 2018-07-13 RX ORDER — TOPIRAMATE 25 MG/1
TABLET ORAL
Qty: 60 TAB | Refills: 0 | Status: SHIPPED | OUTPATIENT
Start: 2018-07-13 | End: 2018-09-14 | Stop reason: SDUPTHER

## 2018-08-09 RX ORDER — CANAGLIFLOZIN 100 MG/1
TABLET, FILM COATED ORAL
Qty: 180 TAB | Refills: 1 | Status: SHIPPED | OUTPATIENT
Start: 2018-08-09 | End: 2019-02-25 | Stop reason: SDUPTHER

## 2018-08-19 RX ORDER — TOPIRAMATE 25 MG/1
TABLET ORAL
Qty: 60 TAB | Refills: 0 | Status: SHIPPED | OUTPATIENT
Start: 2018-08-19 | End: 2018-09-17 | Stop reason: ALTCHOICE

## 2018-09-14 RX ORDER — TOPIRAMATE 25 MG/1
TABLET ORAL
Qty: 60 TAB | Refills: 0 | Status: SHIPPED | OUTPATIENT
Start: 2018-09-14 | End: 2018-09-17 | Stop reason: ALTCHOICE

## 2018-09-17 ENCOUNTER — OFFICE VISIT (OUTPATIENT)
Dept: FAMILY MEDICINE CLINIC | Age: 58
End: 2018-09-17

## 2018-09-17 VITALS
SYSTOLIC BLOOD PRESSURE: 114 MMHG | RESPIRATION RATE: 18 BRPM | BODY MASS INDEX: 29.47 KG/M2 | DIASTOLIC BLOOD PRESSURE: 82 MMHG | WEIGHT: 172.6 LBS | HEIGHT: 64 IN | TEMPERATURE: 98.7 F | HEART RATE: 91 BPM

## 2018-09-17 DIAGNOSIS — E11.9 CONTROLLED TYPE 2 DIABETES MELLITUS WITHOUT COMPLICATION, WITH LONG-TERM CURRENT USE OF INSULIN (HCC): Primary | ICD-10-CM

## 2018-09-17 DIAGNOSIS — F41.9 ANXIETY: ICD-10-CM

## 2018-09-17 DIAGNOSIS — Z79.4 CONTROLLED TYPE 2 DIABETES MELLITUS WITHOUT COMPLICATION, WITH LONG-TERM CURRENT USE OF INSULIN (HCC): Primary | ICD-10-CM

## 2018-09-17 DIAGNOSIS — E78.00 HYPERCHOLESTEREMIA: ICD-10-CM

## 2018-09-17 DIAGNOSIS — M51.36 DDD (DEGENERATIVE DISC DISEASE), LUMBAR: ICD-10-CM

## 2018-09-17 DIAGNOSIS — G47.00 INSOMNIA, UNSPECIFIED TYPE: ICD-10-CM

## 2018-09-17 DIAGNOSIS — Z51.81 MEDICATION MONITORING ENCOUNTER: ICD-10-CM

## 2018-09-17 LAB
BARBITURATES UR POC: NEGATIVE
BENZODIAZEPINES UR POC: NEGATIVE
COCAINE QL URINE POC: NEGATIVE
LOT EXP DATE POC: NORMAL
LOT NUMBER POC: NORMAL
MARIJUANA (THC) QL URINE POC: NEGATIVE
MDMA/ECSTASY UR POC: NEGATIVE
METHADONE QL URINE POC: NEGATIVE
METHAMPHETAMINE QL URINE POC: NEGATIVE
NTI OTHER MICRO TRANSPORT 977598: NEGATIVE
OPIATES QL URINE POC: NEGATIVE
OXYCODONE UR POC: NEGATIVE
VALID INTERNAL CONTROL?: YES

## 2018-09-17 RX ORDER — LORAZEPAM 0.5 MG/1
TABLET ORAL
Qty: 60 TAB | Refills: 2 | Status: SHIPPED | OUTPATIENT
Start: 2018-09-17 | End: 2018-12-14 | Stop reason: SDUPTHER

## 2018-09-17 RX ORDER — TRAMADOL HYDROCHLORIDE 50 MG/1
TABLET ORAL
Qty: 60 TAB | Refills: 2 | Status: SHIPPED | OUTPATIENT
Start: 2018-09-17 | End: 2018-12-14 | Stop reason: SDUPTHER

## 2018-09-17 RX ORDER — CETIRIZINE HCL 10 MG
TABLET ORAL
Refills: 4 | COMMUNITY
Start: 2018-06-18

## 2018-09-17 NOTE — MR AVS SNAPSHOT
[address] [place] [address] 
[phone] Patient: Attila Woodard MRN: [id number] IKG:[id number]/[id number] Visit Information Date & Time Provider Department Dept. Phone Encounter #  
 9/17/2018 10:15 AM Delonte Flores NP 0[facility] [phone] [id number] Upcoming Health Maintenance Date Due Pneumococcal 19-64 Medium Risk (1 of 1 - PPSV23) 6/30/1977 DTaP/Tdap/Td series (1 - Tdap) 6/30/1979 EYE EXAM RETINAL OR DILATED Q1 8/18/2016 FOOT EXAM Q1 11/28/2017 COLONOSCOPY 1/1/2018 ZOSTER VACCINE AGE 60> 4/30/2018 Influenza Age 5 to Adult 8/1/2018 HEMOGLOBIN A1C Q6M 12/13/2018 MICROALBUMIN Q1 6/13/2019 LIPID PANEL Q1 6/13/2019 MEDICARE YEARLY EXAM 6/14/2019 BREAST CANCER SCRN MAMMOGRAM 7/16/2020 Allergies as of 9/17/2018  Review Complete On: 9/17/2018 By: Christine Maurer No Known Allergies Current Immunizations  Reviewed on 11/23/2015 Name Date Influenza Vaccine 9/12/2017, 10/29/2013 Influenza Vaccine Janyth Jac) 12/5/2014 Influenza Vaccine (Quad) PF 11/28/2016, 11/23/2015 Influenza Vaccine Whole 10/1/2011 Pneumococcal Conjugate (PCV-13) 9/12/2017 Not reviewed this visit You Were Diagnosed With   
  
 Codes Comments DDD (degenerative disc disease), lumbar    -  Primary ICD-10-CM: M51.36 
ICD-9-CM: 722.52 Anxiety     ICD-10-CM: F41.9 ICD-9-CM: 300.00 Insomnia, unspecified type     ICD-10-CM: G47.00 ICD-9-CM: 780.52 Medication monitoring encounter     ICD-10-CM: Z51.81 
ICD-9-CM: V58.83 Controlled type 2 diabetes mellitus without complication, with long-term current use of insulin (HCC)     ICD-10-CM: E11.9, Z79.4 ICD-9-CM: 250.00, V58.67 Hypercholesteremia     ICD-10-CM: E78.00 ICD-9-CM: 272.0 Vitals BP Pulse Temp Resp Height(growth percentile) Weight(growth percentile) 114/82 (BP 1 Location: Left arm, BP Patient Position: Sitting) 91 98.7 °F (37.1 °C) (Oral) 18 5' 4\" (1.626 m) 172 lb 9.6 oz (78.3 kg) LMP BMI OB Status Smoking Status (LMP Unknown) 29.63 kg/m2 Hysterectomy Never Smoker Vitals History BMI and BSA Data Body Mass Index Body Surface Area  
 29.63 kg/m 2 1.88 m 2 Preferred Pharmacy Pharmacy Name Phone 310 [facility], [facility] [address] ([address] [phone] Your Updated Medication List  
  
   
This list is accurate as of 9/17/18 10:46 AM.  Always use your most recent med list.  
  
  
  
  
 ACCU-CHEK LUCY PLUS TEST STRP strip Generic drug:  glucose blood VI test strips TEST TWO TIMES DAILY  
  
 BD Single Use Swabs Regular Padm Generic drug:  alcohol swabs USE WITH BLOOD SUGAR CHECKS  
  
 BD ULTRA-FINE SHORT PEN NEEDLE 31 gauge x 5/16\" Ndle Generic drug:  Insulin Needles (Disposable) USE  FOR  LEVEMIR  INJECTION ONE TIME DAILY  
  
 cetirizine 10 mg tablet Commonly known as:  ZYRTEC TK 1 T PO D PRN  
  
 cholecalciferol (vitamin D3) 2,000 unit Tab Take  by mouth. DULoxetine 30 mg capsule Commonly known as:  CYMBALTA TAKE 1 CAPSULE EVERY DAY  
  
 EYE VITAMIN AND MINERALS PO  
  
 gabapentin 300 mg capsule Commonly known as:  NEURONTIN  
TAKE 1 CAPSULE THREE TIMES DAILY  
  
 hydroCHLOROthiazide 12.5 mg tablet Commonly known as:  HYDRODIURIL  
TAKE 1 TABLET EVERY DAY INVOKANA 100 mg tablet Generic drug:  canagliflozin TAKE 2 TABLETS EVERY DAY BEFORE BREAKFAST LEVEMIR FLEXTOUCH U-100 INSULN 100 unit/mL (3 mL) Inpn Generic drug:  insulin detemir U-100 INJECT  40 UNITS AT BEDTIME  
  
 lidocaine 2 % jelly Commonly known as:  XYLOCAINE  
APPLY A THIN FILM TO AFFECTED AREA ONLY(LOW BACK AND NECK ONLY) ONE TIME DAILY AS NEEDED LORazepam 0.5 mg tablet Commonly known as:  ATIVAN  
 TAKE 1 TABLET BY MOUTH TWICE DAILY AS NEEDED FOR ANXIETY - must last 30 days  
  
 meloxicam 15 mg tablet Commonly known as:  MOBIC  
TAKE 1 TABLET EVERY DAY  
  
 metFORMIN  mg tablet Commonly known as:  GLUCOPHAGE XR  
TAKE 2 TABLETS DAILY WITH BREAKFAST AND DINNER  
  
 mometasone 0.1 % ointment Commonly known as:  Etha Im Apply  to affected area two (2) times daily as needed for Skin Irritation. montelukast 10 mg tablet Commonly known as:  SINGULAIR  
TAKE 1 TABLET EVERY DAY  
  
 mupirocin 2 % ointment Commonly known as:  [employer] Apply  to affected area two (2) times a day. As needed for skin infection  
  
 naloxone 2 mg/actuation Spry Use 1 spray intranasally into 1 nostril. Use a new Narcan nasal spray for subsequent doses and administer into alternating nostrils. May repeat every 2 to 3 minutes as needed. nystatin topical cream  
Commonly known as:  MYCOSTATIN Apply  to affected area two (2) times daily as needed for Skin Irritation. rosuvastatin 5 mg tablet Commonly known as:  CRESTOR  
TAKE 1 TABLET EVERY NIGHT  
  
 SUMAtriptan 100 mg tablet Commonly known as:  IMITREX Take 1 Tab by mouth once as needed for Migraine for up to 1 dose. topiramate 25 mg tablet Commonly known as:  TOPAMAX TAKE 2 TABLETS BY MOUTH EVERY NIGHT AT BEDTIME  
  
 traMADol 50 mg tablet Commonly known as:  ULTRAM  
TAKE 1 TABLET BY MOUTH TWICE DAILY AS NEEDED FOR PAIN - must last 30 days  
  
 undecylenic acid-chloroxylenol 25-3 % Soln  
by Apply Externally route. Prescriptions Printed Refills  
 traMADol (ULTRAM) 50 mg tablet 2 Sig: TAKE 1 TABLET BY MOUTH TWICE DAILY AS NEEDED FOR PAIN - must last 30 days Class: Print LORazepam (ATIVAN) 0.5 mg tablet 2 Sig: TAKE 1 TABLET BY MOUTH TWICE DAILY AS NEEDED FOR ANXIETY - must last 30 days Class: Print We Performed the Following AMB [facility] DX DRUG SCREEN 10 [id number] CPT(R)] HEMOGLOBIN A1C WITH EAG [07758 CPT(R)] LIPID PANEL [63937 CPT(R)] Introducing [facility]! Dear Sue: 
Thank you for requesting a [employer] account. Our records indicate that you already have an active [employer] account. You can access your account anytime at [email or web address] Did you know that you can access your hospital and ER discharge instructions at any time in [employer]? You can also review all of your test results from your hospital stay or ER visit. Additional Information If you have questions, please visit the Frequently Asked Questions section of the [employer] website at [email or web address] Remember, [employer] is NOT to be used for urgent needs. For medical emergencies, dial 911. Now available from your iPhone and Android! Please provide this summary of care documentation to your next provider. Your primary care clinician is listed as Sherry Leiva. If you have any questions after today's visit, please call [phone].

## 2018-09-17 NOTE — PROGRESS NOTES
Nikita Munguia is a 59 y.o. female      Fasting    Chief Complaint   Patient presents with    Medication Refill     ativan and ultram     Labs       1. Have you been to the ER, urgent care clinic since your last visit? Hospitalized since your last visit? No  M  2. Have you seen or consulted any other health care providers outside of the [address] [place] [address] since your last visit? Include any pap smears or colon screening.   No        Visit Vitals    /82 (BP 1 Location: Left arm, BP Patient Position: Sitting)    Pulse 91    Temp 98.7 °F (37.1 °C) (Oral)    Resp 18    Ht 5' 4\" (1.626 m)    Wt 172 lb 9.6 oz (78.3 kg)    BMI 29.63 kg/m2           Health Maintenance Due   Topic Date Due    Pneumococcal 19-64 Medium Risk (1 of 1 - PPSV23) 06/30/1977    DTaP/Tdap/Td series (1 - Tdap) 06/30/1979    EYE EXAM RETINAL OR DILATED Q1  08/18/2016    FOOT EXAM Q1  11/28/2017    COLONOSCOPY  01/01/2018    ZOSTER VACCINE AGE 60>  04/30/2018    Influenza Age 9 to Adult  08/01/2018

## 2018-09-17 NOTE — PROGRESS NOTES
HISTORY OF PRESENT ILLNESS  Scott Cooper is a 59 y.o. female. HPI  Cardiovascular Review:  The patient has hyperlipidemia. Diet and Lifestyle: generally follows a low fat low cholesterol diet, generally follows a low sodium diet, exercises sporadically, nonsmoker  Home BP Monitoring: is not measured at home. Pertinent ROS: taking medications as instructed, no medication side effects noted, no TIA's, no chest pain on exertion, no dyspnea on exertion, no swelling of ankles. Diabetes Mellitus:  She has diabetes mellitus, and  hyperlipidemia. Diabetic ROS - medication compliance: compliant all of the time, diabetic diet compliance: compliant all of the time, home glucose monitoring: fasting values range  except when she eats candy corn, max has been a few times at 175. Lab review: orders written for new lab studies as appropriate; see orders.    DDD lumbar spine and anxiety/insomnia:  Due for refill of her tramadol for chronic back pain, takes bid scheduled  This allows her to be able to do her house work, she is nonsurgical  Takes ativan at night only for sleep/anxiety  No early refill requests  Did not take med last night, needs refills      Patient Active Problem List    Diagnosis Date Noted    Advanced care planning/counseling discussion 11/23/2015    Urinary frequency 10/05/2015    Diabetes mellitus type 2, controlled ([facility] 04/17/2015    NICOLE (obstructive sleep apnea) 11/05/2013    Diverticulosis 10/29/2013    DDD (degenerative disc disease), lumbar 07/30/2013    Depression 07/30/2013    GERD (gastroesophageal reflux disease) 07/02/2013    Dysphagia 07/02/2013    Status post gastric banding 07/02/2013    Lumbar stenosis 11/07/2011     Current Outpatient Prescriptions   Medication Sig Dispense Refill    cetirizine (ZYRTEC) 10 mg tablet TK 1 T PO D PRN  4    vit A/C/E ac/ZnOx/cupric oxide (EYE VITAMIN AND MINERALS PO)       traMADol (ULTRAM) 50 mg tablet TAKE 1 TABLET BY MOUTH TWICE DAILY AS NEEDED FOR PAIN - must last 30 days 60 Tab 2    LORazepam (ATIVAN) 0.5 mg tablet TAKE 1 TABLET BY MOUTH TWICE DAILY AS NEEDED FOR ANXIETY - must last 30 days 60 Tab 2    INVOKANA 100 mg tablet TAKE 2 TABLETS EVERY DAY BEFORE BREAKFAST 180 Tab 1    LEVEMIR FLEXTOUCH U-100 INSULN 100 unit/mL (3 mL) inpn INJECT  40 UNITS AT BEDTIME 45 mL 3    BD ULTRA-FINE SHORT PEN NEEDLE 31 gauge x 5/16\" ndle USE  FOR  LEVEMIR  INJECTION ONE TIME DAILY 90 Pen Needle 3    nystatin (MYCOSTATIN) topical cream Apply  to affected area two (2) times daily as needed for Skin Irritation. 30 g 2    mometasone (ELOCON) 0.1 % ointment Apply  to affected area two (2) times daily as needed for Skin Irritation. 45 g 2    BD SINGLE USE SWABS REGULAR padm USE WITH BLOOD SUGAR CHECKS 200 Pad 3    montelukast (SINGULAIR) 10 mg tablet TAKE 1 TABLET EVERY DAY 90 Tab 3    ACCU-CHEK LUCY PLUS TEST STRP strip TEST TWO TIMES DAILY 200 Strip 3    naloxone 2 mg/actuation spry Use 1 spray intranasally into 1 nostril. Use a new Narcan nasal spray for subsequent doses and administer into alternating nostrils. May repeat every 2 to 3 minutes as needed.  1 Each 0    topiramate (TOPAMAX) 25 mg tablet TAKE 2 TABLETS BY MOUTH EVERY NIGHT AT BEDTIME 60 Tab 0    metFORMIN ER (GLUCOPHAGE XR) 500 mg tablet TAKE 2 TABLETS DAILY WITH BREAKFAST AND DINNER 360 Tab 3    DULoxetine (CYMBALTA) 30 mg capsule TAKE 1 CAPSULE EVERY DAY 90 Cap 3    gabapentin (NEURONTIN) 300 mg capsule TAKE 1 CAPSULE THREE TIMES DAILY 270 Cap 3    hydroCHLOROthiazide (HYDRODIURIL) 12.5 mg tablet TAKE 1 TABLET EVERY DAY 90 Tab 3    rosuvastatin (CRESTOR) 5 mg tablet TAKE 1 TABLET EVERY NIGHT 90 Tab 3    meloxicam (MOBIC) 15 mg tablet TAKE 1 TABLET EVERY DAY 90 Tab 3    lidocaine (XYLOCAINE) 2 % jelly APPLY A THIN FILM TO AFFECTED AREA ONLY(LOW BACK AND NECK ONLY) ONE TIME DAILY AS NEEDED 30 mL 1    SUMAtriptan (IMITREX) 100 mg tablet Take 1 Tab by mouth once as needed for Migraine for up to 1 dose. 12 Tab 6    undecylenic acid-chloroxylenol 25-3 % soln by Apply Externally route.  cholecalciferol, vitamin D3, 2,000 unit tab Take  by mouth.  mupirocin (BACTROBAN) 2 % ointment Apply  to affected area two (2) times a day. As needed for skin infection 22 g 0     Family History   Problem Relation Age of Onset    Post-op Nausea/Vomiting Mother     Delayed Awakening Mother     Psychiatric Disorder Mother     Hypertension Mother     Stroke Mother     Headache Mother     Post-op Nausea/Vomiting Daughter     Delayed Awakening Daughter     Cancer Father     Hypertension Father     Cancer Maternal Grandmother     Psychiatric Disorder Maternal Grandmother     Dementia Paternal Grandmother     Dementia Paternal Grandfather     Parkinson's Disease Paternal Grandfather      Social History   Substance Use Topics    Smoking status: Never Smoker    Smokeless tobacco: Never Used    Alcohol use No           ROS  A comprehensive review of system was obtained and negative except findings in the HPI    Visit Vitals    /82 (BP 1 Location: Left arm, BP Patient Position: Sitting)    Pulse 91    Temp 98.7 °F (37.1 °C) (Oral)    Resp 18    Ht 5' 4\" (1.626 m)    Wt 172 lb 9.6 oz (78.3 kg)    LMP  (LMP Unknown)    BMI 29.63 kg/m2     Physical Exam   Constitutional: She is oriented to person, place, and time. She appears well-developed and well-nourished. Neck: No JVD present. Cardiovascular: Normal rate, regular rhythm and intact distal pulses. Exam reveals no gallop and no friction rub. No murmur heard. Pulmonary/Chest: Effort normal and breath sounds normal. No respiratory distress. She has no wheezes. Musculoskeletal: She exhibits no edema. Neurological: She is alert and oriented to person, place, and time. Skin: Skin is warm. Nursing note and vitals reviewed. ASSESSMENT and PLAN  Encounter Diagnoses   Name Primary?     Controlled type 2 diabetes mellitus without complication, with long-term current use of insulin (HCC) Yes    Hypercholesteremia     DDD (degenerative disc disease), lumbar     Anxiety     Insomnia, unspecified type     Medication monitoring encounter      Orders Placed This Encounter    LIPID PANEL    HEMOGLOBIN A1C WITH EAG    AMB POC ALERE ICUP DX DRUG SCREEN 10    cetirizine (ZYRTEC) 10 mg tablet    vit A/C/E ac/ZnOx/cupric oxide (EYE VITAMIN AND MINERALS PO)    traMADol (ULTRAM) 50 mg tablet    LORazepam (ATIVAN) 0.5 mg tablet   UDS  appropriate for no med yesterday  Labs updated today  Recheck 3 mo  Diet for DM control also reviewed    I have discussed the diagnosis with the patient and the intended plan as seen in the above orders. The patient has received an after-visit summary and questions were answered concerning future plans. Patient conveyed understanding of the plan at the time of the visit.     Sven Baker, MSN, ANP  9/17/2018

## 2018-09-18 DIAGNOSIS — S80.861A INSECT BITE OF RIGHT LOWER EXTREMITY, INITIAL ENCOUNTER: ICD-10-CM
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
## 2018-09-18 LAB
CHOLEST SERPL-MCNC: 118 MG/DL (ref 100–199)
EST. AVERAGE GLUCOSE BLD GHB EST-MCNC: 154 MG/DL
HBA1C MFR BLD: 7 % (ref 4.8–5.6)
HDLC SERPL-MCNC: 54 MG/DL
INTERPRETATION, 910389: NORMAL
LDLC SERPL CALC-MCNC: 38 MG/DL (ref 0–99)
Lab: NORMAL
TRIGL SERPL-MCNC: 129 MG/DL (ref 0–149)
VLDLC SERPL CALC-MCNC: 26 MG/DL (ref 5–40)

## 2018-09-19 RX ORDER — MOMETASONE FUROATE 1 MG/G
OINTMENT TOPICAL
Qty: 45 G | Refills: 2 | Status: SHIPPED | OUTPATIENT
Start: 2018-09-19 | End: 2018-11-13 | Stop reason: SDUPTHER

## 2018-09-19 NOTE — PROGRESS NOTES
Hey there, your labs look great, no changes in meds and recheck 3 months as planned.  Lisseth Stevens
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
## 2018-11-13 DIAGNOSIS — S80.861A INSECT BITE OF RIGHT LOWER EXTREMITY, INITIAL ENCOUNTER: ICD-10-CM

## 2018-11-13 RX ORDER — NYSTATIN 100000 U/G
CREAM TOPICAL
Qty: 30 G | Refills: 2 | Status: SHIPPED | OUTPATIENT
Start: 2018-11-13 | End: 2019-02-25 | Stop reason: SDUPTHER

## 2018-11-14 RX ORDER — HYDROCHLOROTHIAZIDE 12.5 MG/1
TABLET ORAL
Qty: 90 TAB | Refills: 3 | Status: SHIPPED | OUTPATIENT
Start: 2018-11-14 | End: 2019-11-04 | Stop reason: SDUPTHER

## 2018-11-14 RX ORDER — GABAPENTIN 300 MG/1
CAPSULE ORAL
Qty: 270 CAP | Refills: 3 | Status: SHIPPED | OUTPATIENT
Start: 2018-11-14 | End: 2019-08-06 | Stop reason: SDUPTHER

## 2018-11-14 RX ORDER — MELOXICAM 15 MG/1
TABLET ORAL
Qty: 90 TAB | Refills: 3 | Status: SHIPPED | OUTPATIENT
Start: 2018-11-14 | End: 2019-11-04 | Stop reason: SDUPTHER

## 2018-11-14 RX ORDER — ROSUVASTATIN CALCIUM 5 MG/1
TABLET, COATED ORAL
Qty: 90 TAB | Refills: 3 | Status: SHIPPED | OUTPATIENT
Start: 2018-11-14 | End: 2019-11-04 | Stop reason: SDUPTHER

## 2018-11-14 RX ORDER — LIDOCAINE HYDROCHLORIDE 20 MG/ML
JELLY TOPICAL
Qty: 30 ML | Refills: 1 | Status: SHIPPED | OUTPATIENT
Start: 2018-11-14 | End: 2019-11-04 | Stop reason: SDUPTHER

## 2018-11-14 RX ORDER — TOPIRAMATE 50 MG/1
TABLET, FILM COATED ORAL
Qty: 180 TAB | Refills: 3 | Status: SHIPPED | OUTPATIENT
Start: 2018-11-14 | End: 2019-06-17 | Stop reason: SDUPTHER

## 2018-11-14 RX ORDER — DULOXETIN HYDROCHLORIDE 30 MG/1
CAPSULE, DELAYED RELEASE ORAL
Qty: 90 CAP | Refills: 3 | Status: SHIPPED | OUTPATIENT
Start: 2018-11-14 | End: 2020-07-24 | Stop reason: ALTCHOICE

## 2018-11-14 RX ORDER — METFORMIN HYDROCHLORIDE 500 MG/1
TABLET, EXTENDED RELEASE ORAL
Qty: 360 TAB | Refills: 3 | Status: SHIPPED | OUTPATIENT
Start: 2018-11-14 | End: 2019-02-25

## 2018-11-14 RX ORDER — MOMETASONE FUROATE 1 MG/G
OINTMENT TOPICAL
Qty: 45 G | Refills: 2 | Status: SHIPPED | OUTPATIENT
Start: 2018-11-14 | End: 2019-02-25 | Stop reason: SDUPTHER

## 2018-11-16 RX ORDER — TOPIRAMATE 25 MG/1
TABLET ORAL
Qty: 60 TAB | Refills: 0 | Status: SHIPPED | OUTPATIENT
Start: 2018-11-16 | End: 2019-01-15 | Stop reason: SDUPTHER

## 2018-11-23 ENCOUNTER — DOCUMENTATION ONLY (OUTPATIENT)
Dept: FAMILY MEDICINE CLINIC | Age: 58
End: 2018-11-23

## 2018-11-23 NOTE — PROGRESS NOTES
[facility] via [facility] My Med[employer]. Awaiting reponse.    Ivy MONAHAN Case ID: [id number]

## 2018-11-25 RX ORDER — TOPIRAMATE 25 MG/1
TABLET ORAL
Qty: 180 TAB | Refills: 0 | Status: SHIPPED | OUTPATIENT
Start: 2018-11-25 | End: 2019-03-20 | Stop reason: SDUPTHER

## 2018-11-27 NOTE — PROGRESS NOTES
Per [facility] the PA for Sadiq Mcbride has been approved until 11/24/19. Ref#: not given    Pharm notified via fax.

## 2018-12-14 ENCOUNTER — OFFICE VISIT (OUTPATIENT)
Dept: FAMILY MEDICINE CLINIC | Age: 58
End: 2018-12-14

## 2018-12-14 VITALS
HEIGHT: 64 IN | RESPIRATION RATE: 19 BRPM | OXYGEN SATURATION: 94 % | HEART RATE: 98 BPM | SYSTOLIC BLOOD PRESSURE: 127 MMHG | WEIGHT: 176.4 LBS | TEMPERATURE: 98.6 F | BODY MASS INDEX: 30.11 KG/M2 | DIASTOLIC BLOOD PRESSURE: 84 MMHG

## 2018-12-14 DIAGNOSIS — E11.9 CONTROLLED TYPE 2 DIABETES MELLITUS WITHOUT COMPLICATION, WITH LONG-TERM CURRENT USE OF INSULIN (HCC): Primary | ICD-10-CM

## 2018-12-14 DIAGNOSIS — F41.9 ANXIETY: ICD-10-CM

## 2018-12-14 DIAGNOSIS — Z79.4 CONTROLLED TYPE 2 DIABETES MELLITUS WITHOUT COMPLICATION, WITH LONG-TERM CURRENT USE OF INSULIN (HCC): Primary | ICD-10-CM

## 2018-12-14 DIAGNOSIS — E78.00 HYPERCHOLESTEREMIA: ICD-10-CM

## 2018-12-14 DIAGNOSIS — M51.36 DDD (DEGENERATIVE DISC DISEASE), LUMBAR: ICD-10-CM

## 2018-12-14 PROBLEM — E11.40 TYPE 2 DIABETES MELLITUS WITH DIABETIC NEUROPATHY (HCC): Status: ACTIVE | Noted: 2018-12-14

## 2018-12-14 RX ORDER — LORAZEPAM 0.5 MG/1
TABLET ORAL
Qty: 60 TAB | Refills: 2 | Status: SHIPPED | OUTPATIENT
Start: 2018-12-14 | End: 2019-03-20 | Stop reason: SDUPTHER

## 2018-12-14 RX ORDER — TRAMADOL HYDROCHLORIDE 50 MG/1
TABLET ORAL
Qty: 60 TAB | Refills: 2 | Status: SHIPPED | OUTPATIENT
Start: 2018-12-14 | End: 2019-03-20 | Stop reason: SDUPTHER

## 2018-12-14 NOTE — PROGRESS NOTES
Ashish Adames is a 59 y.o. female    Discuss metformin and invokana    Chief Complaint   Patient presents with    Medication Refill    Diabetes       1. Have you been to the ER, urgent care clinic since your last visit? Hospitalized since your last visit? No  M  2. Have you seen or consulted any other health care providers outside of the [address] [place] [address] since your last visit? Include any pap smears or colon screening. No      Visit Vitals  /84 (BP 1 Location: Left arm, BP Patient Position: Sitting)   Pulse 98   Temp 98.6 °F (37 °C) (Oral)   Resp 19   Ht 5' 4\" (1.626 m)   Wt 176 lb 6.4 oz (80 kg)   SpO2 94%   BMI 30.28 kg/m²           Health Maintenance Due   Topic Date Due    Pneumococcal 19-64 Medium Risk (1 of 1 - PPSV23) 06/30/1977    DTaP/Tdap/Td series (1 - Tdap) 06/30/1979    Shingrix Vaccine Age 50> (1 of 2) 06/30/2008    EYE EXAM RETINAL OR DILATED  08/18/2017    FOOT EXAM Q1  11/28/2017    COLONOSCOPY  01/01/2018         Medication Reconciliation completed, changes noted.   Please  Update medication list.

## 2018-12-14 NOTE — PROGRESS NOTES
HISTORY OF PRESENT ILLNESS  Km Banks is a 59 y.o. female. HPI  Cardiovascular Review:  The patient has hyperlipidemia. Diet and Lifestyle: generally follows a low fat low cholesterol diet, generally follows a low sodium diet, sedentary, nonsmoker  Home BP Monitoring: is not measured at home. Pertinent ROS: taking medications as instructed, no medication side effects noted, no TIA's, no chest pain on exertion, no dyspnea on exertion, no swelling of ankles. Diabetes Mellitus:  She has diabetes mellitus, and  hyperlipidemia. Diabetic ROS - medication compliance: compliant all of the time, diabetic diet compliance: compliant all of the time, home glucose monitoring: is not performed. Lab review: orders written for new lab studies as appropriate; see orders. DDD lumbar/anxiety  She is due for refills of tramadol and ativan  Takes at night prn sleep and pain of sciatica  No early refill requests    Patient Active Problem List    Diagnosis Date Noted    Type 2 diabetes mellitus with diabetic neuropathy ([facility] 12/14/2018    Advanced care planning/counseling discussion 11/23/2015    Urinary frequency 10/05/2015    Diabetes mellitus type 2, controlled ([facility] 04/17/2015    NICOLE (obstructive sleep apnea) 11/05/2013    Diverticulosis 10/29/2013    DDD (degenerative disc disease), lumbar 07/30/2013    Depression 07/30/2013    GERD (gastroesophageal reflux disease) 07/02/2013    Dysphagia 07/02/2013    Status post gastric banding 07/02/2013    Lumbar stenosis 11/07/2011     Current Outpatient Medications   Medication Sig Dispense Refill    traMADol (ULTRAM) 50 mg tablet TAKE 1 TABLET BY MOUTH TWICE DAILY AS NEEDED FOR PAIN - must last 30 days 60 Tab 2    LORazepam (ATIVAN) 0.5 mg tablet TAKE 1 TABLET BY MOUTH TWICE DAILY AS NEEDED FOR ANXIETY - must last 30 days 60 Tab 2    mometasone (ELOCON) 0.1 % ointment APPLY  TO AFFECTED AREA TWO TIMES DAILY AS NEEDED FOR SKIN IRRITATION.  45 g 2    lidocaine (XYLOCAINE) 2 % jelly APPLY A THIN FILM TO AFFECTED AREA ONLY (LOW BACK AND NECK ONLY) ONE TIME DAILY AS NEEDED 30 mL 1    metFORMIN ER (GLUCOPHAGE XR) 500 mg tablet TAKE 2 TABLETS DAILY WITH BREAKFAST AND DINNER 360 Tab 3    hydroCHLOROthiazide (HYDRODIURIL) 12.5 mg tablet TAKE 1 TABLET EVERY DAY 90 Tab 3    meloxicam (MOBIC) 15 mg tablet TAKE 1 TABLET EVERY DAY 90 Tab 3    rosuvastatin (CRESTOR) 5 mg tablet TAKE 1 TABLET EVERY NIGHT 90 Tab 3    gabapentin (NEURONTIN) 300 mg capsule TAKE 1 CAPSULE THREE TIMES DAILY 270 Cap 3    nystatin (MYCOSTATIN) topical cream APPLY  TO AFFECTED AREA TWO (2) TIMES DAILY AS NEEDED FOR SKIN IRRITATION. 30 g 2    cetirizine (ZYRTEC) 10 mg tablet TK 1 T PO D PRN  4    vit A/C/E ac/ZnOx/cupric oxide (EYE VITAMIN AND MINERALS PO)       INVOKANA 100 mg tablet TAKE 2 TABLETS EVERY DAY BEFORE BREAKFAST 180 Tab 1    LEVEMIR FLEXTOUCH U-100 INSULN 100 unit/mL (3 mL) inpn INJECT  40 UNITS AT BEDTIME 45 mL 3    BD ULTRA-FINE SHORT PEN NEEDLE 31 gauge x 5/16\" ndle USE  FOR  LEVEMIR  INJECTION ONE TIME DAILY 90 Pen Needle 3    BD SINGLE USE SWABS REGULAR padm USE WITH BLOOD SUGAR CHECKS 200 Pad 3    montelukast (SINGULAIR) 10 mg tablet TAKE 1 TABLET EVERY DAY 90 Tab 3    ACCU-CHEK LUCY PLUS TEST STRP strip TEST TWO TIMES DAILY 200 Strip 3    naloxone 2 mg/actuation spry Use 1 spray intranasally into 1 nostril. Use a new Narcan nasal spray for subsequent doses and administer into alternating nostrils. May repeat every 2 to 3 minutes as needed. 1 Each 0    topiramate (TOPAMAX) 25 mg tablet TAKE 2 TABLETS BY MOUTH EVERY NIGHT AT BEDTIME 60 Tab 0    SUMAtriptan (IMITREX) 100 mg tablet Take 1 Tab by mouth once as needed for Migraine for up to 1 dose. 12 Tab 6    undecylenic acid-chloroxylenol 25-3 % soln by Apply Externally route.  cholecalciferol, vitamin D3, 2,000 unit tab Take  by mouth.  mupirocin (BACTROBAN) 2 % ointment Apply  to affected area two (2) times a day. As needed for skin infection 22 g 0    topiramate (TOPAMAX) 25 mg tablet TAKE 2 TABLETS BY MOUTH EVERY NIGHT AT BEDTIME 180 Tab 0    topiramate (TOPAMAX) 25 mg tablet TAKE 2 TABLETS BY MOUTH EVERY NIGHT AT BEDTIME 60 Tab 0    DULoxetine (CYMBALTA) 30 mg capsule TAKE 1 CAPSULE EVERY DAY (Patient not taking: Reported on 12/14/2018) 90 Cap 3    topiramate (TOPAMAX) 50 mg tablet TAKE 1 TABLET TWICE DAILY 180 Tab 3     Family History   Problem Relation Age of Onset    Post-op Nausea/Vomiting Mother     Delayed Awakening Mother     Psychiatric Disorder Mother     Hypertension Mother     Stroke Mother     Headache Mother     Post-op Nausea/Vomiting Daughter     Delayed Awakening Daughter     Cancer Father     Hypertension Father     Cancer Maternal Grandmother     Psychiatric Disorder Maternal Grandmother     Dementia Paternal Grandmother     Dementia Paternal Grandfather     Parkinson's Disease Paternal Grandfather      Social History     Tobacco Use    Smoking status: Never Smoker    Smokeless tobacco: Never Used   Substance Use Topics    Alcohol use: No           ROS  A comprehensive review of system was obtained and negative except findings in the HPI    Visit Vitals  /84 (BP 1 Location: Left arm, BP Patient Position: Sitting)   Pulse 98   Temp 98.6 °F (37 °C) (Oral)   Resp 19   Ht 5' 4\" (1.626 m)   Wt 176 lb 6.4 oz (80 kg)   LMP  (LMP Unknown)   SpO2 94%   BMI 30.28 kg/m²     Physical Exam   Constitutional: She is oriented to person, place, and time. She appears well-developed and well-nourished. Neck: No JVD present. Cardiovascular: Normal rate, regular rhythm and intact distal pulses. Exam reveals no gallop and no friction rub. No murmur heard. Pulmonary/Chest: Effort normal and breath sounds normal. No respiratory distress. She has no wheezes. Musculoskeletal: She exhibits no edema. Neurological: She is alert and oriented to person, place, and time. Skin: Skin is warm. Nursing note and vitals reviewed. ASSESSMENT and PLAN  Encounter Diagnoses   Name Primary?  Controlled type 2 diabetes mellitus without complication, with long-term current use of insulin (HCC) Yes    Hypercholesteremia     DDD (degenerative disc disease), lumbar     Anxiety      Orders Placed This Encounter    LIPID PANEL    HEMOGLOBIN A1C WITH EAG    MICROALBUMIN, UR, RAND W/ MICROALB/CREAT RATIO    traMADol (ULTRAM) 50 mg tablet    LORazepam (ATIVAN) 0.5 mg tablet     Labs updated today  Refills updated as well  Recheck labs in 3 mo and refills due at that time  I have discussed the diagnosis with the patient and the intended plan as seen in the above orders. The patient has received an after-visit summary and questions were answered concerning future plans. Patient conveyed understanding of the plan at the time of the visit.     Zac Casanova, MSN, ANP  12/14/2018

## 2018-12-15 LAB
ALBUMIN/CREAT UR: 13.6 MG/G CREAT (ref 0–30)
CHOLEST SERPL-MCNC: 125 MG/DL (ref 100–199)
CREAT UR-MCNC: 96 MG/DL
EST. AVERAGE GLUCOSE BLD GHB EST-MCNC: 163 MG/DL
HBA1C MFR BLD: 7.3 % (ref 4.8–5.6)
HDLC SERPL-MCNC: 50 MG/DL
INTERPRETATION, 910389: NORMAL
LDLC SERPL CALC-MCNC: 48 MG/DL (ref 0–99)
Lab: NORMAL
MICROALBUMIN UR-MCNC: 13.1 UG/ML
TRIGL SERPL-MCNC: 137 MG/DL (ref 0–149)
VLDLC SERPL CALC-MCNC: 27 MG/DL (ref 5–40)

## 2018-12-16 RX ORDER — MONTELUKAST SODIUM 10 MG/1
TABLET ORAL
Qty: 90 TAB | Refills: 3 | Status: SHIPPED | OUTPATIENT
Start: 2018-12-16 | End: 2019-12-12 | Stop reason: SDUPTHER

## 2018-12-18 NOTE — PROGRESS NOTES
Hey there, your labs look pretty good for sugar and cholesterol. Continue to watch the carbs to help get the A1C under 7.0.  Recheck 3 months, Zoë

## 2019-01-16 RX ORDER — TOPIRAMATE 25 MG/1
TABLET ORAL
Qty: 180 TAB | Refills: 0 | Status: SHIPPED | OUTPATIENT
Start: 2019-01-16 | End: 2019-03-20 | Stop reason: SDUPTHER

## 2019-01-16 RX ORDER — TOPIRAMATE 25 MG/1
TABLET ORAL
Qty: 60 TAB | Refills: 0 | Status: SHIPPED | OUTPATIENT
Start: 2019-01-16 | End: 2019-03-20 | Stop reason: SDUPTHER

## 2019-02-25 ENCOUNTER — TELEPHONE (OUTPATIENT)
Dept: FAMILY MEDICINE CLINIC | Age: 59
End: 2019-02-25
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
## 2019-02-25 DIAGNOSIS — S80.861A INSECT BITE OF RIGHT LOWER EXTREMITY, INITIAL ENCOUNTER: ICD-10-CM

## 2019-02-25 RX ORDER — NYSTATIN 100000 U/G
CREAM TOPICAL
Qty: 30 G | Refills: 2 | Status: SHIPPED | OUTPATIENT
Start: 2019-02-25 | End: 2020-07-24 | Stop reason: ALTCHOICE

## 2019-02-25 RX ORDER — CANAGLIFLOZIN 100 MG/1
TABLET, FILM COATED ORAL
Qty: 180 TAB | Refills: 1 | Status: SHIPPED | OUTPATIENT
Start: 2019-02-25 | End: 2019-02-25

## 2019-02-25 RX ORDER — MOMETASONE FUROATE 1 MG/G
OINTMENT TOPICAL
Qty: 45 G | Refills: 2 | Status: SHIPPED | OUTPATIENT
Start: 2019-02-25 | End: 2020-01-17 | Stop reason: SDUPTHER

## 2019-02-25 RX ORDER — METFORMIN HYDROCHLORIDE 500 MG/1
500 TABLET, EXTENDED RELEASE ORAL 2 TIMES DAILY
Qty: 360 TAB | Refills: 3 | COMMUNITY
Start: 2019-02-25 | End: 2019-03-20

## 2019-02-25 NOTE — TELEPHONE ENCOUNTER
Spoke with patient, having low sugar episodes and confusion in the am. Cut back on her meds, see med list. Dayana Raines

## 2019-02-25 NOTE — TELEPHONE ENCOUNTER
----- Message from [facility] sent at 2/25/2019  1:12 PM EST -----  Regarding: Sriram Santos/jacklyn  Good Afternoon, The Pt would like a call back, pt is concerned because she is having spells where she is waking up and unaware/not conscious of what she is doing in that moment. Pt's best contact number is [phone].

## 2019-02-26 ENCOUNTER — TELEPHONE (OUTPATIENT)
Dept: FAMILY MEDICINE CLINIC | Age: 59
End: 2019-02-26

## 2019-02-26 NOTE — TELEPHONE ENCOUNTER
Faxed lab form to dr Chuck Ring @ [phone]. Confirmation number [id number].  Original form will be placed in scan folder for central scanning after 1month

## 2019-02-26 NOTE — TELEPHONE ENCOUNTER
Pt state she needs labs faxed to [facility] endocrinologist, appt is on 3/7. Fax Number for [facility] [phone]. Pt CB # [id number].

## 2019-02-26 NOTE — LETTER
2/26/2019 4:23 PM 
 
Ms. Jolley Form 
[address] [facility] [id number] Results for orders placed or performed in visit on 12/14/18 LIPID PANEL Result Value Ref Range Cholesterol, total 125 100 - 199 mg/dL Triglyceride 137 0 - 149 mg/dL HDL Cholesterol 50 >39 mg/dL VLDL, calculated 27 5 - 40 mg/dL LDL, calculated 48 0 - 99 mg/dL HEMOGLOBIN A1C WITH EAG Result Value Ref Range Hemoglobin A1c 7.3 (H) 4.8 - 5.6 % Estimated average glucose 163 mg/dL MICROALBUMIN, UR, RAND W/ MICROALB/CREAT RATIO Result Value Ref Range Creatinine, urine 96.0 Not Estab. mg/dL Microalbumin, urine 13.1 Not Estab. ug/mL Microalb/Creat ratio (ug/mg creat.) 13.6 0.0 - 30.0 mg/g creat CVD REPORT Result Value Ref Range INTERPRETATION Note DIABETES PATIENT EDUCATION Result Value Ref Range PDF Image Not applicable Sincerely, 
 
 
Elvin Paget, NP

## 2019-03-20 ENCOUNTER — OFFICE VISIT (OUTPATIENT)
Dept: FAMILY MEDICINE CLINIC | Age: 59
End: 2019-03-20

## 2019-03-20 VITALS
RESPIRATION RATE: 18 BRPM | BODY MASS INDEX: 28.17 KG/M2 | OXYGEN SATURATION: 98 % | HEART RATE: 96 BPM | DIASTOLIC BLOOD PRESSURE: 78 MMHG | WEIGHT: 165 LBS | HEIGHT: 64 IN | SYSTOLIC BLOOD PRESSURE: 120 MMHG | TEMPERATURE: 98.2 F

## 2019-03-20 DIAGNOSIS — M51.36 DDD (DEGENERATIVE DISC DISEASE), LUMBAR: ICD-10-CM

## 2019-03-20 DIAGNOSIS — F41.9 ANXIETY: ICD-10-CM

## 2019-03-20 RX ORDER — LORAZEPAM 0.5 MG/1
TABLET ORAL
Qty: 60 TAB | Refills: 2 | Status: SHIPPED | OUTPATIENT
Start: 2019-03-20 | End: 2019-06-24 | Stop reason: SDUPTHER

## 2019-03-20 RX ORDER — METFORMIN HYDROCHLORIDE 500 MG/1
1000 TABLET, EXTENDED RELEASE ORAL 2 TIMES DAILY
Qty: 360 TAB | Refills: 3 | COMMUNITY
Start: 2019-03-20 | End: 2020-07-24 | Stop reason: SDUPTHER

## 2019-03-20 RX ORDER — TRAMADOL HYDROCHLORIDE 50 MG/1
50 TABLET ORAL
Qty: 60 TAB | Refills: 2 | Status: SHIPPED | OUTPATIENT
Start: 2019-03-20 | End: 2019-04-19

## 2019-03-20 NOTE — PROGRESS NOTES
Identified pt with two pt identifiers(name and ). Reviewed record in preparation for visit and have obtained necessary documentation. Chief Complaint   Patient presents with    Medication Refill      Visit Vitals  /78 (BP 1 Location: Right arm, BP Patient Position: Sitting)   Pulse 96   Temp 98.2 °F (36.8 °C) (Oral)   Resp 18   Ht 5' 4\" (1.626 m)   Wt 165 lb (74.8 kg)   SpO2 98%   BMI 28.32 kg/m²       Health Maintenance Due   Topic    DTaP/Tdap/Td series (1 - Tdap)    Shingrix Vaccine Age 50> (1 of 2)    EYE EXAM RETINAL OR DILATED     Pneumococcal 0-64 years (1 of 1 - PPSV23)    FOOT EXAM Q1     COLONOSCOPY        Coordination of Care Questionnaire:  :   1) Have you been to an emergency room, urgent care, or hospitalized since your last visit? If yes, where when, and reason for visit? no       2. Have seen or consulted any other health care provider since your last visit? If yes, where when, and reason for visit? YES U[facility] Guzmán Endocrinologist end of 2019, and [address] [facility] [address] Foster Scales      3) Do you have an Advanced Directive/ Living Will in place? NO  If yes, do we have a copy on file NO  If no, would you like information NO    Patient is accompanied by self I have received verbal consent from Brigid Amaya to discuss any/all medical information while they are present in the room.

## 2019-03-20 NOTE — PROGRESS NOTES
HISTORY OF PRESENT ILLNESS  Shante Hawthorne is a 59 y.o. female. HPI  Patient is here for 3 mo follow up anxiety/pain management  Not taking meds, only prn  Stress is controlled at this time  DDD is managable on the tramadol  No early refill requests    ROS  A comprehensive review of system was obtained and negative except findings in the HPI    Visit Vitals  /78 (BP 1 Location: Right arm, BP Patient Position: Sitting)   Pulse 96   Temp 98.2 °F (36.8 °C) (Oral)   Resp 18   Ht 5' 4\" (1.626 m)   Wt 165 lb (74.8 kg)   LMP  (LMP Unknown)   SpO2 98%   BMI 28.32 kg/m²     Physical Exam   Constitutional: She is oriented to person, place, and time. She appears well-developed and well-nourished. Neck: No JVD present. Cardiovascular: Normal rate, regular rhythm and intact distal pulses. Exam reveals no gallop and no friction rub. No murmur heard. Pulmonary/Chest: Effort normal and breath sounds normal. No respiratory distress. She has no wheezes. Musculoskeletal: She exhibits no edema. Neurological: She is alert and oriented to person, place, and time. Skin: Skin is warm. Nursing note and vitals reviewed. ASSESSMENT and PLAN  Encounter Diagnoses   Name Primary?  DDD (degenerative disc disease), lumbar     Anxiety      Orders Placed This Encounter    canagliflozin (INVOKANA) 300 mg tablet    metFORMIN ER (GLUCOPHAGE XR) 500 mg tablet    insulin detemir U-100 (LEVEMIR FLEXTOUCH U-100 INSULN) 100 unit/mL (3 mL) inpn    traMADol (ULTRAM) 50 mg tablet    LORazepam (ATIVAN) 0.5 mg tablet     Given refills of all meds  Corrected dosages on DM meds from endo  Follow up 3 mo  I have discussed the diagnosis with the patient and the intended plan as seen in the above orders. The patient has received an after-visit summary and questions were answered concerning future plans. Patient conveyed understanding of the plan at the time of the visit.     Jazmyn Marcus, MSN, ANP  3/20/2019

## 2019-04-01 RX ORDER — PEN NEEDLE, DIABETIC 31 GX5/16"
NEEDLE, DISPOSABLE MISCELLANEOUS
Qty: 90 PEN NEEDLE | Refills: 3 | Status: SHIPPED | OUTPATIENT
Start: 2019-04-01 | End: 2019-12-12 | Stop reason: SDUPTHER

## 2019-05-22 RX ORDER — TOPIRAMATE 25 MG/1
TABLET ORAL
Qty: 60 TAB | Refills: 5 | Status: SHIPPED | OUTPATIENT
Start: 2019-05-22 | End: 2019-06-17 | Stop reason: SDUPTHER

## 2019-06-17 RX ORDER — TOPIRAMATE 25 MG/1
TABLET ORAL
Qty: 180 TAB | Refills: 0 | Status: SHIPPED | OUTPATIENT
Start: 2019-06-17 | End: 2019-06-24 | Stop reason: SDUPTHER

## 2019-06-24 ENCOUNTER — OFFICE VISIT (OUTPATIENT)
Dept: FAMILY MEDICINE CLINIC | Age: 59
End: 2019-06-24

## 2019-06-24 VITALS
BODY MASS INDEX: 26.29 KG/M2 | OXYGEN SATURATION: 96 % | DIASTOLIC BLOOD PRESSURE: 80 MMHG | SYSTOLIC BLOOD PRESSURE: 121 MMHG | HEIGHT: 64 IN | RESPIRATION RATE: 12 BRPM | HEART RATE: 96 BPM | WEIGHT: 154 LBS | TEMPERATURE: 98.7 F

## 2019-06-24 DIAGNOSIS — M51.36 DDD (DEGENERATIVE DISC DISEASE), LUMBAR: ICD-10-CM

## 2019-06-24 DIAGNOSIS — F41.9 ANXIETY: Primary | ICD-10-CM

## 2019-06-24 RX ORDER — TOPIRAMATE 25 MG/1
TABLET ORAL
Qty: 180 TAB | Refills: 3 | Status: SHIPPED | OUTPATIENT
Start: 2019-06-24 | End: 2019-12-12 | Stop reason: SDUPTHER

## 2019-06-24 RX ORDER — LORAZEPAM 0.5 MG/1
TABLET ORAL
Qty: 60 TAB | Refills: 2 | Status: SHIPPED | OUTPATIENT
Start: 2019-06-24 | End: 2019-09-16 | Stop reason: SDUPTHER

## 2019-06-24 RX ORDER — TRAMADOL HYDROCHLORIDE 50 MG/1
50 TABLET ORAL
Qty: 60 TAB | Refills: 2 | Status: SHIPPED | OUTPATIENT
Start: 2019-06-24 | End: 2019-07-24

## 2019-06-24 RX ORDER — TRAMADOL HYDROCHLORIDE 50 MG/1
50 TABLET ORAL
COMMUNITY
End: 2019-06-24 | Stop reason: SDUPTHER

## 2019-08-06 DIAGNOSIS — M51.36 DDD (DEGENERATIVE DISC DISEASE), LUMBAR: Primary | ICD-10-CM

## 2019-08-06 RX ORDER — GABAPENTIN 300 MG/1
CAPSULE ORAL
Qty: 270 CAP | Refills: 3 | OUTPATIENT
Start: 2019-08-06 | End: 2019-11-08 | Stop reason: SDUPTHER

## 2019-09-16 ENCOUNTER — OFFICE VISIT (OUTPATIENT)
Dept: FAMILY MEDICINE CLINIC | Age: 59
End: 2019-09-16

## 2019-09-16 VITALS
WEIGHT: 144 LBS | RESPIRATION RATE: 12 BRPM | TEMPERATURE: 97.8 F | OXYGEN SATURATION: 97 % | HEIGHT: 64 IN | DIASTOLIC BLOOD PRESSURE: 83 MMHG | SYSTOLIC BLOOD PRESSURE: 116 MMHG | HEART RATE: 80 BPM | BODY MASS INDEX: 24.59 KG/M2

## 2019-09-16 DIAGNOSIS — F41.9 ANXIETY: ICD-10-CM

## 2019-09-16 DIAGNOSIS — Z79.4 CONTROLLED TYPE 2 DIABETES MELLITUS WITHOUT COMPLICATION, WITH LONG-TERM CURRENT USE OF INSULIN (HCC): ICD-10-CM

## 2019-09-16 DIAGNOSIS — M51.36 DDD (DEGENERATIVE DISC DISEASE), LUMBAR: ICD-10-CM

## 2019-09-16 DIAGNOSIS — E11.9 CONTROLLED TYPE 2 DIABETES MELLITUS WITHOUT COMPLICATION, WITH LONG-TERM CURRENT USE OF INSULIN (HCC): ICD-10-CM

## 2019-09-16 DIAGNOSIS — E78.00 HYPERCHOLESTEREMIA: ICD-10-CM

## 2019-09-16 DIAGNOSIS — Z00.00 WELL ADULT EXAM: Primary | ICD-10-CM

## 2019-09-16 DIAGNOSIS — I10 ESSENTIAL HYPERTENSION: ICD-10-CM

## 2019-09-16 RX ORDER — TRAMADOL HYDROCHLORIDE 50 MG/1
50 TABLET ORAL
COMMUNITY
End: 2019-09-16 | Stop reason: SDUPTHER

## 2019-09-16 RX ORDER — TRAMADOL HYDROCHLORIDE 50 MG/1
50 TABLET ORAL
Qty: 60 TAB | Refills: 2 | Status: SHIPPED | OUTPATIENT
Start: 2019-09-16 | End: 2019-10-16

## 2019-09-16 RX ORDER — LORAZEPAM 0.5 MG/1
TABLET ORAL
Qty: 60 TAB | Refills: 2 | Status: SHIPPED | OUTPATIENT
Start: 2019-09-16 | End: 2019-12-20 | Stop reason: SDUPTHER

## 2019-09-16 NOTE — PATIENT INSTRUCTIONS
Medicare Wellness Visit, Female     The best way to live healthy is to have a lifestyle where you eat a well-balanced diet, exercise regularly, limit alcohol use, and quit all forms of tobacco/nicotine, if applicable. Regular preventive services are another way to keep healthy. Preventive services (vaccines, screening tests, monitoring & exams) can help personalize your care plan, which helps you manage your own care. Screening tests can find health problems at the earliest stages, when they are easiest to treat. [facility] follows the current, evidence-based guidelines published by the [facility] Lalo ([facility]) when recommending preventive services for our patients. Because we follow these guidelines, sometimes recommendations change over time as research supports it. (For example, mammograms used to be recommended annually. Even though Medicare will still pay for an annual mammogram, the newer guidelines recommend a mammogram every two years for women of average risk.)  Of course, you and your doctor may decide to screen more often for some diseases, based on your risk and your health status. Preventive services for you include:  - Medicare offers their members a free annual wellness visit, which is time for you and your primary care provider to discuss and plan for your preventive service needs. Take advantage of this benefit every year!  -All adults over the age of 72 should receive the recommended pneumonia vaccines. Current USPSTF guidelines recommend a series of two vaccines for the best pneumonia protection.   -All adults should have a flu vaccine yearly and a tetanus vaccine every 10 years. All adults age 61 and older should receive a shingles vaccine once in their lifetime.    -A bone mass density test is recommended when a woman turns 65 to screen for osteoporosis. This test is only recommended one time, as a screening.  Some providers will use this same test as a disease monitoring tool if you already have osteoporosis. -All adults age 38-68 who are overweight should have a diabetes screening test once every three years.   -Other screening tests and preventive services for persons with diabetes include: an eye exam to screen for diabetic retinopathy, a kidney function test, a foot exam, and stricter control over your cholesterol.   -Cardiovascular screening for adults with routine risk involves an electrocardiogram (ECG) at intervals determined by your doctor.   -Colorectal cancer screenings should be done for adults age 54-65 with no increased risk factors for colorectal cancer. There are a number of acceptable methods of screening for this type of cancer. Each test has its own benefits and drawbacks. Discuss with your doctor what is most appropriate for you during your annual wellness visit. The different tests include: colonoscopy (considered the best screening method), a fecal occult blood test, a fecal DNA test, and sigmoidoscopy. -Breast cancer screenings are recommended every other year for women of normal risk, age 54-69.  -Cervical cancer screenings for women over age 72 are only recommended with certain risk factors.   -All adults born between [facility] should be screened once for Hepatitis C.      Here is a list of your current Health Maintenance items (your personalized list of preventive services) with a due date:  Health Maintenance Due   Topic Date Due    DTaP/Tdap/Td  (1 - Tdap) 06/30/1979    Shingles Vaccine (1 of 2) 06/30/2008    Eye Exam  08/18/2017    Pneumococcal Vaccine (1 of 1 - PPSV23) 11/07/2017    Diabetic Foot Care  11/28/2017    Colonoscopy  01/01/2018    Annual Well Visit  06/14/2019    Hemoglobin A1C    06/14/2019    Flu Vaccine  08/01/2019

## 2019-09-16 NOTE — PROGRESS NOTES
This is the Subsequent Medicare Annual Wellness Exam, performed 12 months or more after the Initial AWV or the last Subsequent AWV  I have reviewed the patient's medical history in detail and updated the computerized patient record. History     Past Medical History:   Diagnosis Date    Arrhythmia     \"IRREG HEARTBEAT\"  (DR MARTINEZ)    Arthritis     Chronic pain     BACK    Depression     Diabetes [facility]     Dyspepsia and other specified disorders of function of stomach     GERD (gastroesophageal reflux disease)     Headache     Headache(784.0)     Hypertension     Hyperthyroidism     Morbid obesity ([facility] [address] [facility]     Psychiatric disorder     DEPRESSION    Sleep apnea     no CPAP    Stool color black     Sun-damaged skin     Tanning bed exposure       Past Surgical History:   Procedure Laterality Date    HAND/FINGER SURGERY UNLISTED      x3    HX BACK SURGERY      x4- 2001,2003,2009,2011    HX CHOLECYSTECTOMY  2002    HX GASTRIC BYPASS  5/2008    LAP BAND ( RESHMA RODRIGUEZ)    HX HEENT  2006    LASIK BILAT    HX HYSTERECTOMY  2005    HX ORTHOPAEDIC  2006,2010    HANDS  RIGHT X2; LEFT X1    HX TONSILLECTOMY  1964    HX TUBAL LIGATION  4040,1585,3287 REV    x2 and a reversal    HX UROLOGICAL  04/29/2015    urethra sling    LAP, PLACE ADJUST GASTR BAND  5/2008     Current Outpatient Medications   Medication Sig Dispense Refill    LORazepam (ATIVAN) 0.5 mg tablet TAKE 1 TABLET BY MOUTH TWICE DAILY AS NEEDED FOR ANXIETY - must last 30 days 60 Tab 2    traMADol (ULTRAM) 50 mg tablet Take 1 Tab by mouth every six (6) hours as needed for Pain for up to 30 days.  60 Tab 2    gabapentin (NEURONTIN) 300 mg capsule TAKE 1 CAPSULE THREE TIMES DAILY 270 Cap 3    topiramate (TOPAMAX) 25 mg tablet TAKE 2 TABLETS BY MOUTH EVERY NIGHT AT BEDTIME 180 Tab 3    BD ULTRA-FINE SHORT PEN NEEDLE 31 gauge x 5/16\" ndle USE  FOR  LEVEMIR  INJECTION ONE TIME DAILY 90 Pen Needle 3    canagliflozin (INVOKANA) 300 mg tablet Take 1 Tab by mouth Daily (before breakfast).  metFORMIN ER (GLUCOPHAGE XR) 500 mg tablet Take 2 Tabs by mouth two (2) times a day. 360 Tab 3    insulin detemir U-100 (LEVEMIR FLEXTOUCH U-100 INSULN) 100 unit/mL (3 mL) inpn INJECT  20 UNITS AT BEDTIME 45 mL 3    nystatin (MYCOSTATIN) topical cream APPLY  TO AFFECTED AREA TWO (2) TIMES DAILY AS NEEDED FOR SKIN IRRITATION. 30 g 2    mometasone (ELOCON) 0.1 % ointment APPLY  TO AFFECTED AREA TWO TIMES DAILY AS NEEDED FOR SKIN IRRITATION. 45 g 2    montelukast (SINGULAIR) 10 mg tablet TAKE 1 TABLET EVERY DAY 90 Tab 3    lidocaine (XYLOCAINE) 2 % jelly APPLY A THIN FILM TO AFFECTED AREA ONLY (LOW BACK AND NECK ONLY) ONE TIME DAILY AS NEEDED 30 mL 1    hydroCHLOROthiazide (HYDRODIURIL) 12.5 mg tablet TAKE 1 TABLET EVERY DAY 90 Tab 3    meloxicam (MOBIC) 15 mg tablet TAKE 1 TABLET EVERY DAY 90 Tab 3    rosuvastatin (CRESTOR) 5 mg tablet TAKE 1 TABLET EVERY NIGHT 90 Tab 3    cetirizine (ZYRTEC) 10 mg tablet TK 1 T PO D PRN  4    vit A/C/E ac/ZnOx/cupric oxide (EYE VITAMIN AND MINERALS PO)       BD SINGLE USE SWABS REGULAR padm USE WITH BLOOD SUGAR CHECKS 200 Pad 3    ACCU-CHEK LUCY PLUS TEST STRP strip TEST TWO TIMES DAILY 200 Strip 3    naloxone 2 mg/actuation spry Use 1 spray intranasally into 1 nostril. Use a new Narcan nasal spray for subsequent doses and administer into alternating nostrils. May repeat every 2 to 3 minutes as needed. 1 Each 0    SUMAtriptan (IMITREX) 100 mg tablet Take 1 Tab by mouth once as needed for Migraine for up to 1 dose. 12 Tab 6    undecylenic acid-chloroxylenol 25-3 % soln by Apply Externally route.  cholecalciferol, vitamin D3, 2,000 unit tab Take  by mouth.  mupirocin (BACTROBAN) 2 % ointment Apply  to affected area two (2) times a day.  As needed for skin infection 22 g 0    DULoxetine (CYMBALTA) 30 mg capsule TAKE 1 CAPSULE EVERY DAY (Patient not taking: Reported on 12/14/2018) 90 Cap 3     No Known Allergies  Family History   Problem Relation Age of Onset    Post-op Nausea/Vomiting Mother    Vandana Ghotra Delayed Awakening Mother     Psychiatric Disorder Mother     Hypertension Mother     Stroke Mother     Headache Mother     Post-op Nausea/Vomiting Daughter     Delayed Awakening Daughter     Cancer Father     Hypertension Father     Cancer Maternal Grandmother     Psychiatric Disorder Maternal Grandmother     Dementia Paternal Grandmother     Dementia Paternal Grandfather     Parkinson's Disease Paternal Grandfather      Social History     Tobacco Use    Smoking status: Never Smoker    Smokeless tobacco: Never Used   Substance Use Topics    Alcohol use: No     Patient Active Problem List   Diagnosis Code    Lumbar stenosis M48.061    GERD (gastroesophageal reflux disease) K21.9    Dysphagia R13.10    Status post gastric banding Z98.84    DDD (degenerative disc disease), lumbar M51.36    Depression F32.9    Diverticulosis K57.90    NICOLE (obstructive sleep apnea) G47.33    Diabetes mellitus type 2, controlled ([facility] [address] [facility] E11.9    Urinary frequency R35.0    Advanced care planning/counseling discussion Z71.89    Type 2 diabetes mellitus with diabetic neuropathy ([facility] [address] E11.40       Depression Risk Factor Screening:     3 most recent PHQ Screens 9/16/2019   Little interest or pleasure in doing things Not at all   Feeling down, depressed, irritable, or hopeless Not at all   Total Score PHQ 2 0     Alcohol Risk Factor Screening: You do not drink alcohol or very rarely. Functional Ability and Level of Safety:   Hearing Loss  Hearing is good. Activities of Daily Living  The home contains: no safety equipment. Patient does total self care    Fall Risk  Fall Risk Assessment, last 12 mths 12/14/2018   Able to walk? Yes   Fall in past 12 months? Yes   Fall with injury?  Yes   Number of falls in past 12 months 1   Fall Risk Score 2       Abuse Screen  Patient is not abused    Cognitive Screening Evaluation of Cognitive Function:  Has your family/caregiver stated any concerns about your memory: no  Normal    Patient Care Team   Patient Care Team:  Fozia Jensen NP as PCP - General (Family Practice)  Evelia Saleh MD (Neurology)    Visit Vitals  /83 (BP 1 Location: Left arm, BP Patient Position: Sitting)   Pulse 80   Temp 97.8 °F (36.6 °C) (Oral)   Resp 12   Ht 5' 4\" (1.626 m)   Wt 144 lb (65.3 kg)   LMP  (LMP Unknown)   SpO2 97%   BMI 24.72 kg/m²     Physical Examination:   GENERAL ASSESSMENT: well developed and well nourished  CHEST: normal air exchange, no rales, no rhonchi, no wheezes, respiratory effort normal with no retractions  HEART: regular rate and rhythm, normal S1/S2, no murmurs    Assessment/Plan   Education and counseling provided:  Are appropriate based on today's review and evaluation    Diagnoses and all orders for this visit:    1. DDD (degenerative disc disease), lumbar  -     traMADol (ULTRAM) 50 mg tablet; Take 1 Tab by mouth every six (6) hours as needed for Pain for up to 30 days. 2. Anxiety  -     LORazepam (ATIVAN) 0.5 mg tablet; TAKE 1 TABLET BY MOUTH TWICE DAILY AS NEEDED FOR ANXIETY - must last 30 days    3. Well adult exam  -     LIPID PANEL  -     CBC WITH AUTOMATED DIFF  -     METABOLIC PANEL, COMPREHENSIVE  -     HEMOGLOBIN A1C WITH EAG    4. Controlled type 2 diabetes mellitus without complication, with long-term current use of insulin (HCC)  -     HEMOGLOBIN A1C WITH EAG    5. Hypercholesteremia  -     LIPID PANEL    6. Essential hypertension  -     CBC WITH AUTOMATED DIFF  -     METABOLIC PANEL, COMPREHENSIVE      Refills updated today  Follow up 6 mo if stable    I have discussed the diagnosis with the patient and the intended plan as seen in the above orders. The patient has received an after-visit summary and questions were answered concerning future plans. Patient conveyed understanding of the plan at the time of the visit.     Kathi Scott, MSN, ANP  9/16/2019

## 2019-09-16 NOTE — PROGRESS NOTES
Chief Complaint   Patient presents with    Medication Refill     Pt in office today for med refill  Tramadol and ativan  1. Have you been to the ER, urgent care clinic since your last visit? Hospitalized since your last visit? No    2. Have you seen or consulted any other health care providers outside of the [address] [place] [address] since your last visit? Include any pap smears or colon screening.  Diabetes doc    Pt has no other concerns

## 2019-09-17 LAB
ALBUMIN SERPL-MCNC: 4.1 G/DL (ref 3.6–4.8)
ALBUMIN/GLOB SERPL: 1.7 {RATIO} (ref 1.2–2.2)
ALP SERPL-CCNC: 68 IU/L (ref 39–117)
ALT SERPL-CCNC: 9 IU/L (ref 0–32)
AST SERPL-CCNC: 14 IU/L (ref 0–40)
BASOPHILS # BLD AUTO: 0 X10E3/UL (ref 0–0.2)
BASOPHILS NFR BLD AUTO: 1 %
BILIRUB SERPL-MCNC: 1.1 MG/DL (ref 0–1.2)
BUN SERPL-MCNC: 22 MG/DL (ref 8–27)
BUN/CREAT SERPL: 28 (ref 12–28)
CALCIUM SERPL-MCNC: 9.5 MG/DL (ref 8.7–10.3)
CHLORIDE SERPL-SCNC: 102 MMOL/L (ref 96–106)
CHOLEST SERPL-MCNC: 110 MG/DL (ref 100–199)
CO2 SERPL-SCNC: 25 MMOL/L (ref 20–29)
CREAT SERPL-MCNC: 0.8 MG/DL (ref 0.57–1)
EOSINOPHIL # BLD AUTO: 0.1 X10E3/UL (ref 0–0.4)
EOSINOPHIL NFR BLD AUTO: 1 %
ERYTHROCYTE [DISTWIDTH] IN BLOOD BY AUTOMATED COUNT: 12.6 % (ref 12.3–15.4)
EST. AVERAGE GLUCOSE BLD GHB EST-MCNC: 128 MG/DL
GLOBULIN SER CALC-MCNC: 2.4 G/DL (ref 1.5–4.5)
GLUCOSE SERPL-MCNC: 118 MG/DL (ref 65–99)
HBA1C MFR BLD: 6.1 % (ref 4.8–5.6)
HCT VFR BLD AUTO: 41.5 % (ref 34–46.6)
HDLC SERPL-MCNC: 51 MG/DL
HGB BLD-MCNC: 13.9 G/DL (ref 11.1–15.9)
IMM GRANULOCYTES # BLD AUTO: 0 X10E3/UL (ref 0–0.1)
IMM GRANULOCYTES NFR BLD AUTO: 0 %
INTERPRETATION, 910389: NORMAL
LDLC SERPL CALC-MCNC: 39 MG/DL (ref 0–99)
LYMPHOCYTES # BLD AUTO: 1.7 X10E3/UL (ref 0.7–3.1)
LYMPHOCYTES NFR BLD AUTO: 28 %
Lab: NORMAL
MCH RBC QN AUTO: 30.3 PG (ref 26.6–33)
MCHC RBC AUTO-ENTMCNC: 33.5 G/DL (ref 31.5–35.7)
MCV RBC AUTO: 91 FL (ref 79–97)
MONOCYTES # BLD AUTO: 0.5 X10E3/UL (ref 0.1–0.9)
MONOCYTES NFR BLD AUTO: 8 %
NEUTROPHILS # BLD AUTO: 3.7 X10E3/UL (ref 1.4–7)
NEUTROPHILS NFR BLD AUTO: 62 %
PLATELET # BLD AUTO: 180 X10E3/UL (ref 150–450)
POTASSIUM SERPL-SCNC: 3.7 MMOL/L (ref 3.5–5.2)
PROT SERPL-MCNC: 6.5 G/DL (ref 6–8.5)
RBC # BLD AUTO: 4.58 X10E6/UL (ref 3.77–5.28)
SODIUM SERPL-SCNC: 142 MMOL/L (ref 134–144)
TRIGL SERPL-MCNC: 99 MG/DL (ref 0–149)
VLDLC SERPL CALC-MCNC: 20 MG/DL (ref 5–40)
WBC # BLD AUTO: 5.9 X10E3/UL (ref 3.4–10.8)

## 2019-09-24 ENCOUNTER — DOCUMENTATION ONLY (OUTPATIENT)
Dept: FAMILY MEDICINE CLINIC | Age: 59
End: 2019-09-24

## 2019-09-24 NOTE — PROGRESS NOTES
Faxed 09/16/19 office note/lab results to Dr Apple Stroud per [facility] request. Fax #[phone] confirmation received.

## 2019-11-05 RX ORDER — LIDOCAINE HYDROCHLORIDE 20 MG/ML
JELLY TOPICAL
Qty: 30 ML | Refills: 1 | Status: SHIPPED | OUTPATIENT
Start: 2019-11-05 | End: 2019-12-12 | Stop reason: SDUPTHER

## 2019-11-05 RX ORDER — MELOXICAM 15 MG/1
TABLET ORAL
Qty: 90 TAB | Refills: 3 | Status: SHIPPED | OUTPATIENT
Start: 2019-11-05 | End: 2020-07-30 | Stop reason: SDUPTHER

## 2019-11-05 RX ORDER — ROSUVASTATIN CALCIUM 5 MG/1
TABLET, COATED ORAL
Qty: 90 TAB | Refills: 3 | Status: SHIPPED | OUTPATIENT
Start: 2019-11-05 | End: 2020-07-24 | Stop reason: SDUPTHER

## 2019-11-05 RX ORDER — HYDROCHLOROTHIAZIDE 12.5 MG/1
TABLET ORAL
Qty: 90 TAB | Refills: 3 | Status: SHIPPED | OUTPATIENT
Start: 2019-11-05 | End: 2020-07-30 | Stop reason: SDUPTHER

## 2019-11-08 DIAGNOSIS — M51.36 DDD (DEGENERATIVE DISC DISEASE), LUMBAR: ICD-10-CM

## 2019-11-08 RX ORDER — GABAPENTIN 300 MG/1
CAPSULE ORAL
Qty: 270 CAP | Refills: 0 | Status: SHIPPED | OUTPATIENT
Start: 2019-11-08 | End: 2020-01-16

## 2019-11-21 ENCOUNTER — DOCUMENTATION ONLY (OUTPATIENT)
Dept: FAMILY MEDICINE CLINIC | Age: 59
End: 2019-11-21

## 2019-11-21 NOTE — PROGRESS NOTES
Per [facility] [employer] PA for Cetirizine 10mg was denied. Denial reason is: OTC med exclusion. Alt's are none.   Ref# not given

## 2019-12-12 RX ORDER — TOPIRAMATE 25 MG/1
TABLET ORAL
Qty: 180 TAB | Refills: 0 | Status: SHIPPED | OUTPATIENT
Start: 2019-12-12 | End: 2020-01-16

## 2019-12-12 RX ORDER — MONTELUKAST SODIUM 10 MG/1
TABLET ORAL
Qty: 90 TAB | Refills: 0 | Status: SHIPPED | OUTPATIENT
Start: 2019-12-12 | End: 2020-01-16

## 2019-12-12 RX ORDER — LIDOCAINE HYDROCHLORIDE 20 MG/ML
JELLY TOPICAL
Qty: 30 ML | Refills: 0 | Status: SHIPPED | OUTPATIENT
Start: 2019-12-12 | End: 2020-01-16

## 2019-12-12 RX ORDER — PEN NEEDLE, DIABETIC 31 GX5/16"
NEEDLE, DISPOSABLE MISCELLANEOUS
Qty: 100 PEN NEEDLE | Refills: 3 | Status: SHIPPED | OUTPATIENT
Start: 2019-12-12 | End: 2020-07-24 | Stop reason: SDUPTHER

## 2019-12-20 ENCOUNTER — OFFICE VISIT (OUTPATIENT)
Dept: FAMILY MEDICINE CLINIC | Age: 59
End: 2019-12-20

## 2019-12-20 VITALS
TEMPERATURE: 98.2 F | RESPIRATION RATE: 16 BRPM | BODY MASS INDEX: 25.61 KG/M2 | OXYGEN SATURATION: 98 % | HEART RATE: 76 BPM | WEIGHT: 150 LBS | HEIGHT: 64 IN | SYSTOLIC BLOOD PRESSURE: 111 MMHG | DIASTOLIC BLOOD PRESSURE: 84 MMHG

## 2019-12-20 DIAGNOSIS — F41.9 ANXIETY: ICD-10-CM

## 2019-12-20 DIAGNOSIS — M51.36 DDD (DEGENERATIVE DISC DISEASE), LUMBAR: Primary | ICD-10-CM

## 2019-12-20 RX ORDER — TRAMADOL HYDROCHLORIDE 50 MG/1
50 TABLET ORAL
COMMUNITY
End: 2019-12-20 | Stop reason: SDUPTHER

## 2019-12-20 RX ORDER — TRAMADOL HYDROCHLORIDE 50 MG/1
50 TABLET ORAL
Qty: 60 TAB | Refills: 2 | Status: SHIPPED | OUTPATIENT
Start: 2019-12-20 | End: 2020-01-19

## 2019-12-20 RX ORDER — LORAZEPAM 0.5 MG/1
TABLET ORAL
Qty: 60 TAB | Refills: 2 | Status: SHIPPED | OUTPATIENT
Start: 2019-12-20 | End: 2020-03-05 | Stop reason: SDUPTHER

## 2019-12-20 NOTE — PROGRESS NOTES
HISTORY OF PRESENT ILLNESS  Anjum Sinha is a 62 y.o. female. HPI  Patient is here for follow up chronic pain and anxiety  Due for refill of meds tramadol and ativan  No adr/se of meds  Doing PT for neck  No early refill requests    ROS  A comprehensive review of system was obtained and negative except findings in the HPI    Visit Vitals  /84 (BP 1 Location: Right arm, BP Patient Position: Sitting)   Pulse 76   Temp 98.2 °F (36.8 °C) (Oral)   Resp 16   Ht 5' 4\" (1.626 m)   Wt 150 lb (68 kg)   LMP  (LMP Unknown)   SpO2 98%   BMI 25.75 kg/m²     Physical Exam  Vitals signs and nursing note reviewed. Constitutional:       Appearance: She is well-developed. Comments:      Neck:      Vascular: No JVD. Cardiovascular:      Rate and Rhythm: Normal rate and regular rhythm. Heart sounds: No murmur. No friction rub. No gallop. Pulmonary:      Effort: Pulmonary effort is normal. No respiratory distress. Breath sounds: Normal breath sounds. No wheezing. Skin:     General: Skin is warm. Neurological:      Mental Status: She is alert and oriented to person, place, and time. ASSESSMENT and PLAN  Encounter Diagnoses   Name Primary?  DDD (degenerative disc disease), lumbar Yes    Anxiety      Orders Placed This Encounter    DISCONTD: traMADol (ULTRAM) 50 mg tablet    LORazepam (ATIVAN) 0.5 mg tablet    traMADol (ULTRAM) 50 mg tablet     Refilled tramadol and ativan  Follow up 3 mo  I have discussed the diagnosis with the patient and the intended plan as seen in the above orders. The patient has received an after-visit summary and questions were answered concerning future plans. Patient conveyed understanding of the plan at the time of the visit.     Agnieszka Sabillon, MSN, ANP  12/20/2019

## 2019-12-20 NOTE — PROGRESS NOTES
Chief Complaint   Patient presents with    Medication Refill     Pt in office today for med refill    1. Have you been to the ER, urgent care clinic since your last visit? Hospitalized since your last visit? No    2. Have you seen or consulted any other health care providers outside of the [address] [place] [address] since your last visit? Include any pap smears or colon screening.  No     Pt has no other concerns

## 2020-01-16 DIAGNOSIS — M51.36 DDD (DEGENERATIVE DISC DISEASE), LUMBAR: ICD-10-CM

## 2020-01-16 RX ORDER — LIDOCAINE HYDROCHLORIDE 20 MG/ML
JELLY TOPICAL
Qty: 30 ML | Refills: 0 | Status: SHIPPED | OUTPATIENT
Start: 2020-01-16 | End: 2020-04-16

## 2020-01-16 RX ORDER — MONTELUKAST SODIUM 10 MG/1
TABLET ORAL
Qty: 90 TAB | Refills: 0 | Status: SHIPPED | OUTPATIENT
Start: 2020-01-16 | End: 2020-05-11

## 2020-01-16 RX ORDER — TOPIRAMATE 25 MG/1
TABLET ORAL
Qty: 180 TAB | Refills: 0 | Status: SHIPPED | OUTPATIENT
Start: 2020-01-16 | End: 2020-05-11

## 2020-01-16 RX ORDER — GABAPENTIN 300 MG/1
CAPSULE ORAL
Qty: 270 CAP | Refills: 0 | Status: SHIPPED | OUTPATIENT
Start: 2020-01-16 | End: 2020-05-11 | Stop reason: SDUPTHER

## 2020-01-17 DIAGNOSIS — S80.861A INSECT BITE OF RIGHT LOWER EXTREMITY, INITIAL ENCOUNTER: ICD-10-CM
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
## 2020-01-18 RX ORDER — MOMETASONE FUROATE 1 MG/G
OINTMENT TOPICAL
Qty: 45 G | Refills: 2 | Status: SHIPPED | OUTPATIENT
Start: 2020-01-18 | End: 2020-07-24 | Stop reason: ALTCHOICE

## 2020-01-24 LAB — HBA1C MFR BLD HPLC: 6.3 %

## 2020-01-25 LAB — CREATININE, EXTERNAL: 0.87

## 2020-02-06 ENCOUNTER — APPOINTMENT (OUTPATIENT)
Dept: CT IMAGING | Age: 60
End: 2020-02-06
Attending: STUDENT IN AN ORGANIZED HEALTH CARE EDUCATION/TRAINING PROGRAM
Payer: MEDICARE

## 2020-02-06 ENCOUNTER — HOSPITAL ENCOUNTER (EMERGENCY)
Age: 60
Discharge: HOME | End: 2020-02-07
Attending: STUDENT IN AN ORGANIZED HEALTH CARE EDUCATION/TRAINING PROGRAM
Payer: MEDICARE

## 2020-02-06 DIAGNOSIS — N20.0 RENAL STONE: Primary | ICD-10-CM

## 2020-02-06 LAB
ALBUMIN SERPL-MCNC: 3.5 G/DL (ref 3.5–5)
ALBUMIN/GLOB SERPL: 0.9 {RATIO} (ref 1.1–2.2)
ALP SERPL-CCNC: 75 U/L (ref 45–117)
ALT SERPL-CCNC: 15 U/L (ref 12–78)
ANION GAP SERPL CALC-SCNC: 8 MMOL/L (ref 5–15)
AST SERPL-CCNC: 10 U/L (ref 15–37)
BASOPHILS # BLD: 0 K/UL (ref 0–0.1)
BASOPHILS NFR BLD: 0 % (ref 0–1)
BILIRUB SERPL-MCNC: 2.4 MG/DL (ref 0.2–1)
BUN SERPL-MCNC: 18 MG/DL (ref 6–20)
BUN/CREAT SERPL: 20 (ref 12–20)
CALCIUM SERPL-MCNC: 9.3 MG/DL (ref 8.5–10.1)
CHLORIDE SERPL-SCNC: 105 MMOL/L (ref 97–108)
CO2 SERPL-SCNC: 23 MMOL/L (ref 21–32)
COMMENT, HOLDF: NORMAL
CREAT SERPL-MCNC: 0.89 MG/DL (ref 0.55–1.02)
DIFFERENTIAL METHOD BLD: ABNORMAL
EOSINOPHIL # BLD: 0 K/UL (ref 0–0.4)
EOSINOPHIL NFR BLD: 0 % (ref 0–7)
ERYTHROCYTE [DISTWIDTH] IN BLOOD BY AUTOMATED COUNT: 12.1 % (ref 11.5–14.5)
GLOBULIN SER CALC-MCNC: 3.8 G/DL (ref 2–4)
GLUCOSE SERPL-MCNC: 157 MG/DL (ref 65–100)
HCT VFR BLD AUTO: 42.1 % (ref 35–47)
HGB BLD-MCNC: 13.9 G/DL (ref 11.5–16)
IMM GRANULOCYTES # BLD AUTO: 0.1 K/UL (ref 0–0.04)
IMM GRANULOCYTES NFR BLD AUTO: 1 % (ref 0–0.5)
LACTATE SERPL-SCNC: 1 MMOL/L (ref 0.4–2)
LYMPHOCYTES # BLD: 0.6 K/UL (ref 0.8–3.5)
LYMPHOCYTES NFR BLD: 4 % (ref 12–49)
MCH RBC QN AUTO: 30.1 PG (ref 26–34)
MCHC RBC AUTO-ENTMCNC: 33 G/DL (ref 30–36.5)
MCV RBC AUTO: 91.1 FL (ref 80–99)
MONOCYTES # BLD: 1 K/UL (ref 0–1)
MONOCYTES NFR BLD: 7 % (ref 5–13)
NEUTS SEG # BLD: 12.2 K/UL (ref 1.8–8)
NEUTS SEG NFR BLD: 88 % (ref 32–75)
NRBC # BLD: 0 K/UL (ref 0–0.01)
NRBC BLD-RTO: 0 PER 100 WBC
PLATELET # BLD AUTO: 133 K/UL (ref 150–400)
PMV BLD AUTO: 10.3 FL (ref 8.9–12.9)
POTASSIUM SERPL-SCNC: 3.4 MMOL/L (ref 3.5–5.1)
PROT SERPL-MCNC: 7.3 G/DL (ref 6.4–8.2)
RBC # BLD AUTO: 4.62 M/UL (ref 3.8–5.2)
RBC MORPH BLD: ABNORMAL
SAMPLES BEING HELD,HOLD: NORMAL
SODIUM SERPL-SCNC: 136 MMOL/L (ref 136–145)
UR CULT HOLD, URHOLD: NORMAL
WBC # BLD AUTO: 13.9 K/UL (ref 3.6–11)

## 2020-02-06 PROCEDURE — 83605 ASSAY OF LACTIC ACID: CPT

## 2020-02-06 PROCEDURE — 80053 COMPREHEN METABOLIC PANEL: CPT

## 2020-02-06 PROCEDURE — 96374 THER/PROPH/DIAG INJ IV PUSH: CPT

## 2020-02-06 PROCEDURE — 99285 EMERGENCY DEPT VISIT HI MDM: CPT

## 2020-02-06 PROCEDURE — 81001 URINALYSIS AUTO W/SCOPE: CPT

## 2020-02-06 PROCEDURE — 74011250636 HC RX REV CODE- 250/636: Performed by: STUDENT IN AN ORGANIZED HEALTH CARE EDUCATION/TRAINING PROGRAM

## 2020-02-06 PROCEDURE — 74011636320 HC RX REV CODE- 636/320: Performed by: RADIOLOGY

## 2020-02-06 PROCEDURE — 36415 COLL VENOUS BLD VENIPUNCTURE: CPT

## 2020-02-06 PROCEDURE — 93005 ELECTROCARDIOGRAM TRACING: CPT

## 2020-02-06 PROCEDURE — 74177 CT ABD & PELVIS W/CONTRAST: CPT

## 2020-02-06 PROCEDURE — 85025 COMPLETE CBC W/AUTO DIFF WBC: CPT

## 2020-02-06 RX ADMIN — IOPAMIDOL 100 ML: 755 INJECTION, SOLUTION INTRAVENOUS at 22:59

## 2020-02-06 RX ADMIN — SODIUM CHLORIDE 1000 ML: 900 INJECTION, SOLUTION INTRAVENOUS at 22:26

## 2020-02-07 VITALS
DIASTOLIC BLOOD PRESSURE: 66 MMHG | WEIGHT: 150 LBS | SYSTOLIC BLOOD PRESSURE: 101 MMHG | BODY MASS INDEX: 25.61 KG/M2 | HEIGHT: 64 IN | RESPIRATION RATE: 19 BRPM | HEART RATE: 104 BPM | OXYGEN SATURATION: 98 % | TEMPERATURE: 99.4 F

## 2020-02-07 LAB
APPEARANCE UR: ABNORMAL
ATRIAL RATE: 123 BPM
BACTERIA URNS QL MICRO: ABNORMAL /HPF
BILIRUB UR QL: NEGATIVE
CALCULATED P AXIS, ECG09: 32 DEGREES
CALCULATED R AXIS, ECG10: 173 DEGREES
CALCULATED T AXIS, ECG11: 26 DEGREES
COLOR UR: ABNORMAL
DIAGNOSIS, 93000: NORMAL
EPITH CASTS URNS QL MICRO: ABNORMAL /LPF
GLUCOSE UR STRIP.AUTO-MCNC: >1000 MG/DL
HGB UR QL STRIP: ABNORMAL
HYALINE CASTS URNS QL MICRO: ABNORMAL /LPF (ref 0–5)
KETONES UR QL STRIP.AUTO: 80 MG/DL
LEUKOCYTE ESTERASE UR QL STRIP.AUTO: ABNORMAL
NITRITE UR QL STRIP.AUTO: POSITIVE
P-R INTERVAL, ECG05: 176 MS
PH UR STRIP: 6 [PH] (ref 5–8)
PROT UR STRIP-MCNC: NEGATIVE MG/DL
Q-T INTERVAL, ECG07: 300 MS
QRS DURATION, ECG06: 78 MS
QTC CALCULATION (BEZET), ECG08: 429 MS
RBC #/AREA URNS HPF: ABNORMAL /HPF (ref 0–5)
SP GR UR REFRACTOMETRY: 1.01 (ref 1–1.03)
UROBILINOGEN UR QL STRIP.AUTO: 0.2 EU/DL (ref 0.2–1)
VENTRICULAR RATE, ECG03: 123 BPM
WBC URNS QL MICRO: ABNORMAL /HPF (ref 0–4)

## 2020-02-07 PROCEDURE — 74011250636 HC RX REV CODE- 250/636: Performed by: STUDENT IN AN ORGANIZED HEALTH CARE EDUCATION/TRAINING PROGRAM

## 2020-02-07 RX ORDER — OXYCODONE AND ACETAMINOPHEN 5; 325 MG/1; MG/1
1 TABLET ORAL
Qty: 12 TAB | Refills: 0 | Status: SHIPPED | OUTPATIENT
Start: 2020-02-07 | End: 2020-02-10

## 2020-02-07 RX ORDER — KETOROLAC TROMETHAMINE 30 MG/ML
15 INJECTION, SOLUTION INTRAMUSCULAR; INTRAVENOUS
Status: COMPLETED | OUTPATIENT
Start: 2020-02-07 | End: 2020-02-07

## 2020-02-07 RX ORDER — KETOROLAC TROMETHAMINE 10 MG/1
10 TABLET, FILM COATED ORAL
Qty: 12 TAB | Refills: 0 | Status: SHIPPED | OUTPATIENT
Start: 2020-02-07 | End: 2020-02-10

## 2020-02-07 RX ADMIN — KETOROLAC TROMETHAMINE 15 MG: 30 INJECTION, SOLUTION INTRAMUSCULAR at 00:40

## 2020-02-07 NOTE — ED PROVIDER NOTES
62 y.o. female with past medical history significant for dyspepsia, GERD, hypertension, hyperthyroidism, diabetes, arthritis, and depression who presents from home with chief complaint of abdominal pain. She states that she first felt pain in her upper abdomen when she woke up last night at 11:30 PM to use the bathroom. She describes the location of the pain as under her ribcage and more concentrated on the left. She reports taking a Pepto-Bismol tablet, as well as 500 mg arthritis pain medication, with no relief, and notes that she was unable to sleep due to the pain. She also notes headache, cough and fever, reporting her temperature as 99 PTA. She indicates nausea as well, and though she denies vomiting, she mentions she was \"gagging\" and would have vomited were it not for her lap band procedure done in 2008. She rates her pain as 8/10. There are no other acute medical concerns at this time. Social hx: denies tobacco use, denies alcohol use, denies illicit drug use  Surgical hx: Significant for lap band, hysterectomy, tubal ligation, cholecystectomy, gastric bypass  PCP: Rbeecca Leonardo NP    Note written by Lucien Bejarano, as dictated by Dulce Hester MD 7:36 PM        The history is provided by the patient. No  was used.         Past Medical History:   Diagnosis Date    Arrhythmia     \"IRREG HEARTBEAT\"  (DR Rose Garcia)    Arthritis     Chronic pain     BACK    Depression     Diabetes ([address]     Dyspepsia and other specified disorders of function of stomach     GERD (gastroesophageal reflux disease)     Headache     Headache(784.0)     Hypertension     Hyperthyroidism     Morbid obesity ([address]     Psychiatric disorder     DEPRESSION    Sleep apnea     no CPAP    Stool color black     Sun-damaged skin     Tanning bed exposure        Past Surgical History:   Procedure Laterality Date    HAND/FINGER SURGERY UNLISTED      x3    HX BACK SURGERY      x4- 2001,2003,2009,2011    HX CHOLECYSTECTOMY  2002    HX GASTRIC BYPASS  5/2008    LAP BAND ( MONTSERRATChristina Clarissacharly Purvis)    HX HEENT  2006    LASIK BILAT    HX HYSTERECTOMY  2005    HX ORTHOPAEDIC  2006,2010    HANDS  RIGHT X2; LEFT X1    HX TONSILLECTOMY  1964    HX TUBAL LIGATION  [id number] REV    x2 and a reversal    HX UROLOGICAL  04/29/2015    urethra sling    LAP, PLACE ADJUST GASTR BAND  5/2008         Family History:   Problem Relation Age of Onset    Post-op Nausea/Vomiting Mother     Delayed Awakening Mother     Psychiatric Disorder Mother     Hypertension Mother     Stroke Mother     Headache Mother     Post-op Nausea/Vomiting Daughter     Delayed Awakening Daughter     Cancer Father     Hypertension Father     Cancer Maternal Grandmother     Psychiatric Disorder Maternal Grandmother     Dementia Paternal Grandmother     Dementia Paternal Grandfather     Parkinson's Disease Paternal Grandfather        Social History     Socioeconomic History    Marital status:      Spouse name: Not on file    Number of children: Not on file    Years of education: Not on file    Highest education level: Not on file   Occupational History    Not on file   Social Needs    Financial resource strain: Not on file    Food insecurity:     Worry: Not on file     Inability: Not on file    Transportation needs:     Medical: Not on file     Non-medical: Not on file   Tobacco Use    Smoking status: Never Smoker    Smokeless tobacco: Never Used   Substance and Sexual Activity    Alcohol use: No    Drug use: No    Sexual activity: Yes     Partners: Male   Lifestyle    Physical activity:     Days per week: Not on file     Minutes per session: Not on file    Stress: Not on file   Relationships    Social connections:     Talks on phone: Not on file     Gets together: Not on file     Attends Alevism service: Not on file     Active member of club or organization: Not on file     Attends meetings of clubs or organizations: Not on file     Relationship status: Not on file    Intimate partner violence:     Fear of current or ex partner: Not on file     Emotionally abused: Not on file     Physically abused: Not on file     Forced sexual activity: Not on file   Other Topics Concern    Not on file   Social History Narrative    Not on file         ALLERGIES: Patient has no known allergies. Review of Systems   Constitutional: Positive for fever. Negative for activity change, diaphoresis and fatigue. HENT: Negative for congestion and sore throat. Eyes: Negative for photophobia and visual disturbance. Respiratory: Positive for cough. Negative for chest tightness and shortness of breath. Cardiovascular: Negative for chest pain, palpitations and leg swelling. Gastrointestinal: Positive for abdominal pain and nausea. Negative for blood in stool, constipation, diarrhea and vomiting. Genitourinary: Negative for difficulty urinating, dysuria, flank pain, frequency and hematuria. Musculoskeletal: Negative for back pain. Neurological: Positive for headaches. Negative for dizziness, syncope and numbness. All other systems reviewed and are negative. Vitals:    02/06/20 1945 02/06/20 1950 02/06/20 2114 02/06/20 2145   BP: 123/66   104/61   Pulse: (!) 123  (!) 116 (!) 113   Resp: 15  25 17   Temp:       SpO2: 95% 95% 95% 94%   Weight:       Height:                Physical Exam  Vitals signs and nursing note reviewed. Constitutional:       General: She is not in acute distress. Appearance: She is well-developed. She is not diaphoretic. Comments: Uncomfortable appearing   HENT:      Head: Normocephalic and atraumatic. Nose: Nose normal.      Mouth/Throat:      Pharynx: No oropharyngeal exudate. Eyes:      General: No scleral icterus. Right eye: No discharge. Left eye: No discharge.       Conjunctiva/sclera: Conjunctivae normal.   Neck:      Musculoskeletal: Normal range of motion and neck supple. Thyroid: No thyromegaly. Vascular: No JVD. Trachea: No tracheal deviation. Cardiovascular:      Rate and Rhythm: Regular rhythm. Tachycardia present. Heart sounds: Normal heart sounds. No murmur. No friction rub. No gallop. Comments: Tachycardic in the 120s  Pulmonary:      Effort: Pulmonary effort is normal. No respiratory distress. Breath sounds: Normal breath sounds. No stridor. No wheezing or rales. Chest:      Chest wall: No tenderness. Abdominal:      General: Bowel sounds are normal. There is no distension. Palpations: There is no mass. Tenderness: There is abdominal tenderness in the left upper quadrant. There is no rebound. Musculoskeletal: Normal range of motion. General: No tenderness. Lymphadenopathy:      Cervical: No cervical adenopathy. Skin:     General: Skin is warm and dry. Coloration: Skin is not pale. Findings: No erythema or rash. Neurological:      Mental Status: She is alert and oriented to person, place, and time. Cranial Nerves: No cranial nerve deficit. Coordination: Coordination normal.   Psychiatric:         Behavior: Behavior normal.         Thought Content:  Thought content normal.         Judgment: Judgment normal.     Note written by Lucien Samayoa, as dictated by Darío Garcia MD 7:36 PM      MDM       Procedures

## 2020-02-07 NOTE — ED TRIAGE NOTES
Triage: Since midnight last night has been having LUQ abd pain.  + N/V, low grade fever.   Denies SOB, Denies C/P

## 2020-02-07 NOTE — DISCHARGE INSTRUCTIONS
Patient Education      You were seen and evaluated today in the Emergency Department and diagnosed with a kidney stone. Please call [facility] at [phone] for follow up. Kidney Stone: Care Instructions  Your Care Instructions    Kidney stones are formed when salts, minerals, and other substances normally found in the urine clump together. They can be as small as grains of sand or, rarely, as large as golf balls. While the stone is traveling through the ureter, which is the tube that carries urine from the kidney to the bladder, you will probably feel pain. The pain may be mild or very severe. You may also have some blood in your urine. As soon as the stone reaches the bladder, any intense pain should go away. If a stone is too large to pass on its own, you may need a medical procedure to help you pass the stone. The doctor has checked you carefully, but problems can develop later. If you notice any problems or new symptoms, get medical treatment right away. Follow-up care is a key part of your treatment and safety. Be sure to make and go to all appointments, and call your doctor if you are having problems. It's also a good idea to know your test results and keep a list of the medicines you take. How can you care for yourself at home? · Drink plenty of fluids, enough so that your urine is light yellow or clear like water. If you have kidney, heart, or liver disease and have to limit fluids, talk with your doctor before you increase the amount of fluids you drink. · Take pain medicines exactly as directed. Call your doctor if you think you are having a problem with your medicine. ? If the doctor gave you a prescription medicine for pain, take it as prescribed. ? If you are not taking a prescription pain medicine, ask your doctor if you can take an over-the-counter medicine. Read and follow all instructions on the label.   · Your doctor may ask you to strain your urine so that you can collect your kidney stone when it passes. You can use a kitchen strainer or a tea strainer to catch the stone. Store it in a plastic bag until you see your doctor again. Preventing future kidney stones  Some changes in your diet may help prevent kidney stones. Depending on the cause of your stones, your doctor may recommend that you:  · Drink plenty of fluids, enough so that your urine is light yellow or clear like water. If you have kidney, heart, or liver disease and have to limit fluids, talk with your doctor before you increase the amount of fluids you drink. · Limit coffee, tea, and alcohol. Also avoid grapefruit juice. · Do not take more than the recommended daily dose of vitamins C and D.  · Avoid antacids such as Gaviscon, Maalox, Mylanta, or Tums. · Limit the amount of salt (sodium) in your diet. · Eat a balanced diet that is not too high in protein. · Limit foods that are high in a substance called oxalate, which can cause kidney stones. These foods include dark green vegetables, rhubarb, chocolate, wheat bran, nuts, cranberries, and beans. When should you call for help? Call your doctor now or seek immediate medical care if:    · You cannot keep down fluids.     · Your pain gets worse.     · You have a fever or chills.     · You have new or worse pain in your back just below your rib cage (the flank area).     · You have new or more blood in your urine.    Watch closely for changes in your health, and be sure to contact your doctor if:    · You do not get better as expected. Where can you learn more? Go to http://anish-andrews.info/. Enter R111 in the search box to learn more about \"Kidney Stone: Care Instructions. \"  Current as of: October 31, 2018  Content Version: 12.2  © 4460-2430 [employer]. Care instructions adapted under license by [employer] (which disclaims liability or warranty for this information).  If you have questions about a medical condition or this instruction, always ask your healthcare professional. [place] [address] any warranty or liability for your use of this information.

## 2020-02-21 ENCOUNTER — PATIENT OUTREACH (OUTPATIENT)
Dept: FAMILY MEDICINE CLINIC | Age: 60
End: 2020-02-21

## 2020-02-21 NOTE — PROGRESS NOTES
Ambulatory Care Management Note    Date/Time:  2/21/2020 1:40 PM    This [facility] to patient today to offer care management services. Introduction to self and role of care manager provided. Patient accepted care management services at this time. Follow up call scheduled at this time. Patient has Ambulatory Care Manager's contact number for for any questions or concerns.

## 2020-03-05 ENCOUNTER — OFFICE VISIT (OUTPATIENT)
Dept: FAMILY MEDICINE CLINIC | Age: 60
End: 2020-03-05

## 2020-03-05 VITALS
HEIGHT: 64 IN | TEMPERATURE: 97.3 F | HEART RATE: 87 BPM | BODY MASS INDEX: 25.95 KG/M2 | DIASTOLIC BLOOD PRESSURE: 82 MMHG | SYSTOLIC BLOOD PRESSURE: 121 MMHG | OXYGEN SATURATION: 96 % | WEIGHT: 152 LBS | RESPIRATION RATE: 16 BRPM

## 2020-03-05 DIAGNOSIS — N20.0 KIDNEY STONE: Primary | ICD-10-CM

## 2020-03-05 DIAGNOSIS — F41.9 ANXIETY: ICD-10-CM

## 2020-03-05 RX ORDER — LORAZEPAM 0.5 MG/1
TABLET ORAL
Qty: 60 TAB | Refills: 2 | Status: SHIPPED | OUTPATIENT
Start: 2020-03-05 | End: 2020-11-24 | Stop reason: ALTCHOICE

## 2020-03-05 RX ORDER — SUMATRIPTAN 100 MG/1
100 TABLET, FILM COATED ORAL
Qty: 12 TAB | Refills: 6 | Status: SHIPPED | OUTPATIENT
Start: 2020-03-05 | End: 2020-03-05

## 2020-03-05 RX ORDER — TRAMADOL HYDROCHLORIDE 50 MG/1
50 TABLET ORAL
Qty: 60 TAB | Refills: 2 | Status: SHIPPED | OUTPATIENT
Start: 2020-03-05 | End: 2020-04-04

## 2020-03-05 NOTE — PROGRESS NOTES
Chief Complaint   Patient presents with    Follow-up     Pt in office today for follow up visit  -pt states that she had kidney stones and is felling better  -pt would like provider to fill out paper    1. Have you been to the ER, urgent care clinic since your last visit? Hospitalized since your last visit? No    2. Have you seen or consulted any other health care providers outside of the [address] [place] [address] since your last visit? Include any pap smears or colon screening.  No     Pt has no other concerns

## 2020-03-06 ENCOUNTER — PATIENT OUTREACH (OUTPATIENT)
Dept: FAMILY MEDICINE CLINIC | Age: 60
End: 2020-03-06

## 2020-03-06 NOTE — PROGRESS NOTES
HISTORY OF PRESENT ILLNESS  Maurice Limon is a 62 y.o. female. HPI  Patient recently seen in ER for new onset kidney stone, obstructed to had to have lithotripsy and stent  They have since passed  Needs to get renewal of her pain medication and anxiety meds for the 3 months. ROS  A comprehensive review of system was obtained and negative except findings in the HPI    Visit Vitals  /82 (BP 1 Location: Left arm, BP Patient Position: Sitting)   Pulse 87   Temp 97.3 °F (36.3 °C) (Oral)   Resp 16   Ht 5' 4\" (1.626 m)   Wt 152 lb (68.9 kg)   LMP  (LMP Unknown)   SpO2 96%   BMI 26.09 kg/m²     Physical Exam  Vitals signs and nursing note reviewed. Constitutional:       Appearance: She is well-developed. Comments:      Neck:      Vascular: No JVD. Cardiovascular:      Rate and Rhythm: Normal rate and regular rhythm. Heart sounds: No murmur. No friction rub. No gallop. Pulmonary:      Effort: Pulmonary effort is normal. No respiratory distress. Breath sounds: Normal breath sounds. No wheezing. Skin:     General: Skin is warm. Neurological:      Mental Status: She is alert and oriented to person, place, and time. ASSESSMENT and PLAN  Encounter Diagnoses   Name Primary?  Kidney stone Yes    Anxiety      Orders Placed This Encounter    traMADoL (ULTRAM) 50 mg tablet    SUMAtriptan (IMITREX) 100 mg tablet    LORazepam (ATIVAN) 0.5 mg tablet     Refills updated today  Recheck 3 mo  I have discussed the diagnosis with the patient and the intended plan as seen in the above orders. The patient has received an after-visit summary and questions were answered concerning future plans. Patient conveyed understanding of the plan at the time of the visit.     Manual Neth, MSN, ANP  3/6/2020

## 2020-04-16 RX ORDER — LIDOCAINE HYDROCHLORIDE 20 MG/ML
JELLY TOPICAL
Qty: 30 ML | Refills: 0 | Status: SHIPPED | OUTPATIENT
Start: 2020-04-16 | End: 2020-05-11

## 2020-05-10 ENCOUNTER — ED HISTORICAL/CONVERTED ENCOUNTER (OUTPATIENT)
Dept: OTHER | Age: 60
End: 2020-05-10

## 2020-05-11 DIAGNOSIS — M51.36 DDD (DEGENERATIVE DISC DISEASE), LUMBAR: ICD-10-CM

## 2020-05-11 RX ORDER — LIDOCAINE HYDROCHLORIDE 20 MG/ML
JELLY TOPICAL
Qty: 30 ML | Refills: 0 | Status: SHIPPED | OUTPATIENT
Start: 2020-05-11 | End: 2020-07-30 | Stop reason: SDUPTHER

## 2020-05-11 RX ORDER — TOPIRAMATE 25 MG/1
TABLET ORAL
Qty: 180 TAB | Refills: 0 | Status: SHIPPED | OUTPATIENT
Start: 2020-05-11 | End: 2020-07-30 | Stop reason: SDUPTHER

## 2020-05-11 RX ORDER — GABAPENTIN 300 MG/1
CAPSULE ORAL
Qty: 270 CAP | Refills: 0 | Status: SHIPPED | OUTPATIENT
Start: 2020-05-11 | End: 2020-07-30 | Stop reason: SDUPTHER

## 2020-05-11 RX ORDER — MONTELUKAST SODIUM 10 MG/1
TABLET ORAL
Qty: 90 TAB | Refills: 0 | Status: SHIPPED | OUTPATIENT
Start: 2020-05-11 | End: 2020-07-30 | Stop reason: SDUPTHER

## 2020-06-08 ENCOUNTER — VIRTUAL VISIT (OUTPATIENT)
Dept: FAMILY MEDICINE CLINIC | Age: 60
End: 2020-06-08

## 2020-06-08 DIAGNOSIS — Z79.4 CONTROLLED TYPE 2 DIABETES MELLITUS WITHOUT COMPLICATION, WITH LONG-TERM CURRENT USE OF INSULIN (HCC): ICD-10-CM

## 2020-06-08 DIAGNOSIS — M51.36 DDD (DEGENERATIVE DISC DISEASE), LUMBAR: Primary | ICD-10-CM

## 2020-06-08 DIAGNOSIS — E11.9 CONTROLLED TYPE 2 DIABETES MELLITUS WITHOUT COMPLICATION, WITH LONG-TERM CURRENT USE OF INSULIN (HCC): ICD-10-CM

## 2020-06-08 RX ORDER — TRAMADOL HYDROCHLORIDE 50 MG/1
50 TABLET ORAL
Qty: 60 TAB | Refills: 2 | Status: SHIPPED | OUTPATIENT
Start: 2020-06-08 | End: 2020-07-08

## 2020-06-08 NOTE — PROGRESS NOTES
Rebecca Jewell is a 62 y.o. female who was seen by synchronous (real-time) audio-video technology on 6/8/2020. Consent: Rebecca Jewell, who was seen by synchronous (real-time) audio-video technology, and/or her healthcare decision maker, is aware that this patient-initiated, Telehealth encounter on 6/8/2020 is a billable service, with coverage as determined by her insurance carrier. She is aware that she may receive a bill and has provided verbal consent to proceed: Yes. I spent at least 15 minutes on this visit with this established patient. 712  Subjective:   Rebecca Jewell is a 62 y.o. female who was seen for Medication Refill  she is due for refill of tramadol  Takes bid scheduled daily  No early refill requests,  reviewed    Blood sugar readings have been good  140 or less most mornings, did have some high readings due to steroid injection in ER due to allergic reaction      Prior to Admission medications    Medication Sig Start Date End Date Taking? Authorizing Provider   traMADoL (ULTRAM) 50 mg tablet Take 1 Tab by mouth two (2) times daily as needed for Pain for up to 30 days. Max Daily Amount: 100 mg. 6/8/20 7/8/20 Yes Michelle Ramon NP   topiramate (TOPAMAX) 25 mg tablet TAKE 2 TABLETS EVERY NIGHT AT BEDTIME 5/11/20   Michelle Ramon NP   montelukast (SINGULAIR) 10 mg tablet TAKE 1 TABLET EVERY DAY 5/11/20   Michelle Ramon NP   lidocaine (XYLOCAINE) 2 % jelly APPLY A THIN FILM TO AFFECTED AREA ONLY (LOW BACK AND NECK ONLY) ONE TIME DAILY AS NEEDED 5/11/20   Michelle Ramon NP   gabapentin (NEURONTIN) 300 mg capsule TAKE 1 CAPSULE THREE TIMES DAILY 5/11/20   Michelle Ramon NP   LORazepam (ATIVAN) 0.5 mg tablet TAKE 1 TABLET BY MOUTH TWICE DAILY AS NEEDED FOR ANXIETY - must last 30 days 3/5/20   Michelle Ramon NP   mometasone (ELOCON) 0.1 % ointment APPLY  TO AFFECTED AREA TWO TIMES DAILY AS NEEDED FOR SKIN IRRITATION.  1/18/20   Michelle Ramon NP insulin detemir U-100 (LEVEMIR FLEXTOUCH U-100 INSULN) 100 unit/mL (3 mL) inpn INJECT  40 UNITS  AT BEDTIME 1/16/20   Vanessa Mahajan NP   DROPLET PEN NEEDLE 31 gauge x 5/16\" ndle USE  FOR  LEVEMIR  INJECTION ONE TIME DAILY 12/12/19   Vanessa Mahajan NP   rosuvastatin (CRESTOR) 5 mg tablet TAKE 1 TABLET EVERY NIGHT 11/5/19   Vanessa Mahajan NP   meloxicam (MOBIC) 15 mg tablet TAKE 1 TABLET EVERY DAY 11/5/19   Vanessa Mahajan NP   hydroCHLOROthiazide (HYDRODIURIL) 12.5 mg tablet TAKE 1 TABLET EVERY DAY 11/5/19   Vanessa Mahajan NP   canagliflozin (INVOKANA) 300 mg tablet Take 1 Tab by mouth Daily (before breakfast). 3/20/19   Vanessa Mahajan NP   metFORMIN ER (GLUCOPHAGE XR) 500 mg tablet Take 2 Tabs by mouth two (2) times a day. 3/20/19   Vanessa Mahajan NP   nystatin (MYCOSTATIN) topical cream APPLY  TO AFFECTED AREA TWO (2) TIMES DAILY AS NEEDED FOR SKIN IRRITATION. 2/25/19   Vanessa Mahajan NP   DULoxetine (CYMBALTA) 30 mg capsule TAKE 1 CAPSULE EVERY DAY  Patient not taking: Reported on 12/14/2018 11/14/18   Yuliet JONES DO   cetirizine (ZYRTEC) 10 mg tablet TK 1 T PO D PRN 6/18/18   Provider, Historical   vit A/C/E ac/ZnOx/cupric oxide (EYE VITAMIN AND MINERALS PO)  7/1/18   Provider, Historical   BD SINGLE USE SWABS REGULAR padm USE WITH BLOOD SUGAR CHECKS 4/3/18   Vanessa Mahajan NP   ACCU-CHEK LUCY PLUS TEST STRP strip TEST TWO TIMES DAILY 4/3/18   Vanessa Mahajan NP   naloxone 2 mg/actuation spry Use 1 spray intranasally into 1 nostril. Use a new Narcan nasal spray for subsequent doses and administer into alternating nostrils. May repeat every 2 to 3 minutes as needed. 3/8/18   Sonja Flores, NAYELI   undecylenic acid-chloroxylenol 25-3 % soln by Apply Externally route. Provider, Historical   cholecalciferol, vitamin D3, 2,000 unit tab Take  by mouth. Provider, Historical   mupirocin (BACTROBAN) 2 % ointment Apply  to affected area two (2) times a day.  As needed for skin infection 12/16/15   Hieu Marquez NP     No Known Allergies    Patient Active Problem List    Diagnosis Date Noted    Type 2 diabetes mellitus with diabetic neuropathy ([facility] 12/14/2018    Advanced care planning/counseling discussion 11/23/2015    Urinary frequency 10/05/2015    Diabetes mellitus type 2, controlled ([facility] 04/17/2015    NICOLE (obstructive sleep apnea) 11/05/2013    Diverticulosis 10/29/2013    DDD (degenerative disc disease), lumbar 07/30/2013    Depression 07/30/2013    GERD (gastroesophageal reflux disease) 07/02/2013    Dysphagia 07/02/2013    Status post gastric banding 07/02/2013    Lumbar stenosis 11/07/2011       ROS  A comprehensive review of system was obtained and negative except findings in the HPI      Objective:     Visit Vitals  LMP  (LMP Unknown)      General: alert, cooperative, no distress   Mental  status: normal mood, behavior, speech, dress, motor activity, and thought processes, able to follow commands   HENT: NCAT   Neck: no visualized mass   Resp: no respiratory distress   Neuro: no gross deficits   Skin: no discoloration or lesions of concern on visible areas   Psychiatric: normal affect, consistent with stated mood, no evidence of hallucinations     Additional exam findings: none    Diagnoses and all orders for this visit:    1. DDD (degenerative disc disease), lumbar  -     traMADoL (ULTRAM) 50 mg tablet; Take 1 Tab by mouth two (2) times daily as needed for Pain for up to 30 days. Max Daily Amount: 100 mg.    2. DM, noninsulin, well controlled    Given refills x 3 mo  Follow up 3 mo  No changes in DM meds, readings in good range        We discussed the expected course, resolution and complications of the diagnosis(es) in detail. Medication risks, benefits, costs, interactions, and alternatives were discussed as indicated. I advised her to contact the office if her condition worsens, changes or fails to improve as anticipated.  She expressed understanding with the diagnosis(es) and plan. Murphy Mcneil is a 62 y.o. female who was evaluated by a video visit encounter for concerns as above. Patient identification was verified prior to start of the visit. A caregiver was present when appropriate. Due to this being a TeleHealth encounter (During [facility]-13 public health emergency), evaluation of the following organ systems was limited: Vitals/Constitutional/EENT/Resp/CV/GI//MS/Neuro/Skin/Heme-Lymph-Imm. Pursuant to the emergency declaration under the [address] [place] [address], [facility] waiver authority and the [employer] Act, this Virtual  Visit was conducted, with patient's (and/or legal guardian's) consent, to reduce the patient's risk of exposure to COVID-19 and provide necessary medical care. Services were provided through a video synchronous discussion virtually to substitute for in-person clinic visit. Patient and provider were located at their individual homes.       Patria Gonzalez NP

## 2020-07-14 RX ORDER — GLUCOSAMINE/CHONDR SU A SOD 750-600 MG
TABLET ORAL
COMMUNITY

## 2020-07-24 ENCOUNTER — OFFICE VISIT (OUTPATIENT)
Dept: ENDOCRINOLOGY | Age: 60
End: 2020-07-24
Payer: MEDICARE

## 2020-07-24 VITALS
DIASTOLIC BLOOD PRESSURE: 85 MMHG | TEMPERATURE: 97.9 F | BODY MASS INDEX: 32.53 KG/M2 | HEIGHT: 60 IN | HEART RATE: 104 BPM | WEIGHT: 165.7 LBS | OXYGEN SATURATION: 97 % | SYSTOLIC BLOOD PRESSURE: 115 MMHG

## 2020-07-24 DIAGNOSIS — E78.2 MIXED HYPERLIPIDEMIA: ICD-10-CM

## 2020-07-24 DIAGNOSIS — E11.69 TYPE 2 DIABETES MELLITUS WITH OTHER SPECIFIED COMPLICATION, WITHOUT LONG-TERM CURRENT USE OF INSULIN (HCC): Primary | ICD-10-CM

## 2020-07-24 DIAGNOSIS — E11.65 UNCONTROLLED TYPE 2 DIABETES MELLITUS WITH HYPERGLYCEMIA (HCC): ICD-10-CM

## 2020-07-24 LAB
GLUCOSE POC: 165 MG/DL
HBA1C MFR BLD HPLC: 7.9 %

## 2020-07-24 PROCEDURE — 82962 GLUCOSE BLOOD TEST: CPT | Performed by: INTERNAL MEDICINE

## 2020-07-24 PROCEDURE — 99214 OFFICE O/P EST MOD 30 MIN: CPT | Performed by: INTERNAL MEDICINE

## 2020-07-24 PROCEDURE — 83036 HEMOGLOBIN GLYCOSYLATED A1C: CPT | Performed by: INTERNAL MEDICINE

## 2020-07-24 RX ORDER — SITAGLIPTIN 100 MG/1
100 TABLET, FILM COATED ORAL DAILY
Qty: 90 TAB | Refills: 2 | Status: SHIPPED | OUTPATIENT
Start: 2020-07-24 | End: 2020-10-16

## 2020-07-24 RX ORDER — ROSUVASTATIN CALCIUM 5 MG/1
5 TABLET, COATED ORAL
Qty: 90 TAB | Refills: 2 | Status: SHIPPED | OUTPATIENT
Start: 2020-07-24 | End: 2020-10-22

## 2020-07-24 RX ORDER — SITAGLIPTIN 100 MG/1
100 TABLET, FILM COATED ORAL DAILY
Qty: 30 TAB | Refills: 4 | Status: SHIPPED | OUTPATIENT
Start: 2020-07-24 | End: 2020-07-24

## 2020-07-24 RX ORDER — BLOOD SUGAR DIAGNOSTIC
STRIP MISCELLANEOUS
Qty: 100 STRIP | Refills: 4 | Status: SHIPPED | OUTPATIENT
Start: 2020-07-24 | End: 2020-07-24

## 2020-07-24 RX ORDER — METFORMIN HYDROCHLORIDE 500 MG/1
1000 TABLET, EXTENDED RELEASE ORAL 2 TIMES DAILY
Qty: 360 TAB | Refills: 4 | Status: SHIPPED | OUTPATIENT
Start: 2020-07-24 | End: 2020-07-24

## 2020-07-24 RX ORDER — SITAGLIPTIN 100 MG/1
TABLET, FILM COATED ORAL
COMMUNITY
Start: 2020-05-26 | End: 2020-07-24 | Stop reason: SDUPTHER

## 2020-07-24 RX ORDER — ROSUVASTATIN CALCIUM 5 MG/1
5 TABLET, COATED ORAL
Qty: 90 TAB | Refills: 4 | Status: SHIPPED | OUTPATIENT
Start: 2020-07-24 | End: 2020-07-24

## 2020-07-24 RX ORDER — PEN NEEDLE, DIABETIC 30 GX3/16"
NEEDLE, DISPOSABLE MISCELLANEOUS
Qty: 100 PEN NEEDLE | Refills: 4 | Status: SHIPPED | OUTPATIENT
Start: 2020-07-24 | End: 2020-07-24

## 2020-07-24 RX ORDER — INSULIN DETEMIR 100 [IU]/ML
10 INJECTION, SOLUTION SUBCUTANEOUS
Qty: 3 ADJUSTABLE DOSE PRE-FILLED PEN SYRINGE | Refills: 2 | Status: SHIPPED | OUTPATIENT
Start: 2020-07-24 | End: 2020-10-22

## 2020-07-24 RX ORDER — BLOOD SUGAR DIAGNOSTIC
STRIP MISCELLANEOUS
Qty: 300 STRIP | Refills: 2 | Status: SHIPPED | OUTPATIENT
Start: 2020-07-24 | End: 2020-11-24 | Stop reason: SDUPTHER

## 2020-07-24 RX ORDER — PEN NEEDLE, DIABETIC 30 GX3/16"
NEEDLE, DISPOSABLE MISCELLANEOUS
Qty: 200 PEN NEEDLE | Refills: 2 | Status: SHIPPED | OUTPATIENT
Start: 2020-07-24 | End: 2020-11-24 | Stop reason: SDUPTHER

## 2020-07-24 RX ORDER — METFORMIN HYDROCHLORIDE 500 MG/1
1000 TABLET, EXTENDED RELEASE ORAL 2 TIMES DAILY
Qty: 360 TAB | Refills: 2 | Status: SHIPPED | OUTPATIENT
Start: 2020-07-24 | End: 2020-10-22

## 2020-07-24 NOTE — PROGRESS NOTES
History and Physical    Patient: Kathleen Hayes MRN: [id number]  SSN: [id number]    YOB: 1960  Age: 56 y.o. Sex: female      Subjective:      Kathleen Hayes is a 56 y.o. female with past medical history of hypertension, hyperlipidemia, obesity status post lap band in 2008 (lost 100 pounds) is here for follow-up of type 2 diabetes mellitus. She remains in primary care of Dr. Isabel Das. she has not had any more episodes of confusion since we decreased Levemir dose. at the last visit we continued metformin 500 mg 2 tablets twice a day, Januvia 100 mg daily, Invokana 300 mg daily and Levemir 10 units daily. Patient has gained around 15 pounds since last visit. She has not been following diabetic diet, she has not been exercising, she was not walking for a while. She feels tired and fatigued. She does not check her blood glucose regularly. Just a couple weeks back she started walking 2 miles every day for exercise and now she has started working part-time. She had diabetic eye exam last month and everything looked normal.    Glucometer reading: Patient is not checking blood glucose much.   There are only a few readings, ranging from 103 to 158 mg/dL    Updated diabetes history:  · Diagnosis: 5 years  · Current treatment: Metformin 500 mg 2 tabs bid, Invokana 300 mg daily, Januvia 100 mg daily, Levemir 10 units at night  · Past treatment: no  · Glucose checks: 1-2 times a day as above  · Hyperglycemia: rarely  · Hypoglycemia: yes  · Meals per day: 3, breakfast: egg/breakfast bar, lunch: squash and string beans, cabbage and chicken, dinner: chicken, shrimp, fish, snacks: bakes pretzels  · Exercise: sometimes walks when it is warm  · DM related hospitalizations: no  Complications of DM:  · CAD: no  · CVA: no  · PVD: no  · Amputations: no   · Retinopathy: no; last exam was 12/2018  · Gastropathy: no  · Nephropathy: no  · Neuropathy: no  Medications:  · Statin: crestor 5 mg  · ACE-I: no  · ASA: no  · Diabetes education: no      Past Medical History:   Diagnosis Date    Arrhythmia     \"IRREG HEARTBEAT\"  (DR MARTINEZ)    Arthritis     Chronic pain     BACK    Depression     Diabetes ([address]     Dyspepsia and other specified disorders of function of stomach     GERD (gastroesophageal reflux disease)     Headache     Headache(784.0)     Hypertension     Hypertension 7/14/2020    Hyperthyroidism     Morbid obesity ([address]     Psychiatric disorder     DEPRESSION    Sleep apnea     no CPAP    Stool color black     Sun-damaged skin     Tanning bed exposure      Past Surgical History:   Procedure Laterality Date    HAND/FINGER SURGERY UNLISTED      x3    HX BACK SURGERY      x4- 2001,2003,2009,2011    HX CHOLECYSTECTOMY  2002    HX GASTRIC BYPASS  5/2008    LAP BAND ( [address] [place])    HX HEENT  2006    LASIK BILAT    HX HYSTERECTOMY  2005    HX ORTHOPAEDIC  2006,2010    HANDS  RIGHT X2; LEFT X1    HX TONSILLECTOMY  1964    HX TUBAL LIGATION  [id number] REV    x2 and a reversal    HX UROLOGICAL  04/29/2015    urethra sling    LAP, PLACE ADJUST GASTR BAND  5/2008      Family History   Problem Relation Age of Onset    Post-op Nausea/Vomiting Mother     Delayed Awakening Mother     Psychiatric Disorder Mother     Hypertension Mother     Stroke Mother     Headache Mother     Post-op Nausea/Vomiting Daughter     Delayed Awakening Daughter     Cancer Father     Hypertension Father     Cancer Maternal Grandmother     Psychiatric Disorder Maternal Grandmother     Dementia Paternal Grandmother     Dementia Paternal Grandfather     Parkinson's Disease Paternal Grandfather      Social History     Tobacco Use    Smoking status: Never Smoker    Smokeless tobacco: Never Used   Substance Use Topics    Alcohol use: No      Prior to Admission medications    Medication Sig Start Date End Date Taking?  Authorizing Provider   rosuvastatin (CRESTOR) 5 mg tablet Take 1 Tab by mouth nightly for 90 days. 7/24/20 10/22/20 Yes Baby MD Tamy   Januvia 100 mg tablet Take 1 Tab by mouth daily for 90 days. 7/24/20 10/22/20 Yes Raegan Morelos MD   canagliflozin (INVOKANA) 300 mg tablet Take 1 Tab by mouth Daily (before breakfast) for 90 days. 7/24/20 10/22/20 Yes Raegan Morelos MD   insulin detemir U-100 (Levemir FlexTouch U-100 Insuln) 100 unit/mL (3 mL) inpn 10 Units by SubCUTAneous route nightly for 90 days. INJECT  40 UNITS  AT BEDTIME 7/24/20 10/22/20 Yes Raegan Morelos MD   glucose blood VI test strips (Accu-Chek Larissa Plus test strp) strip Use to check glucose twice a day, 90 days 7/24/20  Yes Raegan Morelos MD   Insulin Needles, Disposable, (Droplet Pen Needle) 31 gauge x 5/16\" ndle Use to take insulin once a day, 90 days 7/24/20  Yes Raegan Morelos MD   metFORMIN ER (GLUCOPHAGE XR) 500 mg tablet Take 2 Tabs by mouth two (2) times a day for 90 days. 7/24/20 10/22/20 Yes Raegan Morelos MD   Biotin 2,500 mcg cap Take  by mouth.     Provider, Historical   topiramate (TOPAMAX) 25 mg tablet TAKE 2 TABLETS EVERY NIGHT AT BEDTIME 5/11/20   Anita Bosch NP   montelukast (SINGULAIR) 10 mg tablet TAKE 1 TABLET EVERY DAY 5/11/20   Anita Bosch NP   lidocaine (XYLOCAINE) 2 % jelly APPLY A THIN FILM TO AFFECTED AREA ONLY (LOW BACK AND NECK ONLY) ONE TIME DAILY AS NEEDED 5/11/20   Anita Bosch NP   gabapentin (NEURONTIN) 300 mg capsule TAKE 1 CAPSULE THREE TIMES DAILY 5/11/20   Anita Bosch NP   LORazepam (ATIVAN) 0.5 mg tablet TAKE 1 TABLET BY MOUTH TWICE DAILY AS NEEDED FOR ANXIETY - must last 30 days 3/5/20   Anita Bosch NP   meloxicam (MOBIC) 15 mg tablet TAKE 1 TABLET EVERY DAY 11/5/19   Anita Bosch NP   hydroCHLOROthiazide (HYDRODIURIL) 12.5 mg tablet TAKE 1 TABLET EVERY DAY 11/5/19   Anita Conquest, NP   cetirizine (ZYRTEC) 10 mg tablet TK 1 T PO D PRN 6/18/18   Provider, Historical   BD SINGLE USE SWABS REGULAR padm USE WITH BLOOD SUGAR CHECKS 4/3/18 Irwin Louis NP   naloxone 2 mg/actuation spry Use 1 spray intranasally into 1 nostril. Use a new Narcan nasal spray for subsequent doses and administer into alternating nostrils. May repeat every 2 to 3 minutes as needed. 3/8/18   Catarino Wilson NP   cholecalciferol, vitamin D3, 2,000 unit tab Take  by mouth. Provider, Historical        No Known Allergies    Review of Systems:  ROS    A comprehensive review of systems was preformed and it is negative except mentioned in HPI    Objective:     Vitals:    07/24/20 1404   BP: 115/85   Pulse: (!) 104   Temp: 97.9 °F (36.6 °C)   TempSrc: Temporal   SpO2: 97%   Weight: 165 lb 11.2 oz (75.2 kg)   Height: 5' (1.524 m)        Physical Exam:    Physical Exam  Vitals signs and nursing note reviewed. Constitutional:       Appearance: Normal appearance. HENT:      Head: Normocephalic and atraumatic. Eyes:      Extraocular Movements: Extraocular movements intact. Cardiovascular:      Rate and Rhythm: Normal rate and regular rhythm. Pulmonary:      Effort: Pulmonary effort is normal.      Breath sounds: Normal breath sounds. Skin:     General: Skin is warm and dry. Neurological:      Mental Status: She is alert. Psychiatric:         Mood and Affect: Mood normal.         Behavior: Behavior normal.         Thought Content: Thought content normal.         Judgment: Judgment normal.          Labs and Imaging:  Results for Lazaro Chi (MRN [id number]) as of 7/24/2020 14:23   Ref. Range 7/24/2020 14:16   Hemoglobin A1c (POC) Latest Units: % 7.9   Results for Lazaro Chi (MRN [id number]) as of 7/24/2020 14:23   Ref.  Range 7/24/2020 14:16   GLUCOSE,FAST - POC Latest Units: mg/dL 165     Last 3 Recorded Weights in this Encounter    07/24/20 1404   Weight: 165 lb 11.2 oz (75.2 kg)        Lab Results   Component Value Date/Time    Hemoglobin A1c 6.1 (H) 09/16/2019 02:28 PM    Hemoglobin A1c 7.3 (H) 12/14/2018 11:12 AM    Hemoglobin A1c 7.0 (H) 09/17/2018 10:45 AM    Hemoglobin A1c, External 6.3 01/24/2020        Assessment:     Patient Active Problem List   Diagnosis Code    Lumbar stenosis M48.061    GERD (gastroesophageal reflux disease) K21.9    Dysphagia R13.10    Status post gastric banding Z98.84    DDD (degenerative disc disease), lumbar M51.36    Depression F32.9    Diverticulosis K57.90    NICOLE (obstructive sleep apnea) G47.33    Diabetes mellitus type 2, controlled ([facility] E11.9    Urinary frequency R35.0    Advanced care planning/counseling discussion Z71.89    Type 2 diabetes mellitus with diabetic neuropathy ([facility] E11.40           Plan:   type 2 diabetes mellitus uncontrolled   Hemoglobin A1c was 7.3% on 16441582, 6.8% on 4-8-2019, 6.8% in 7-, 6.3% on 1-, 7.9% today. Fingerstick blood glucose is 165 mg/dL in my office today. Up to date with diabetes related annual labs: yes 1/2020  Up to date with diabetic eye exam: yes 6/2020    plan:  Glucose control has deteriorated, appears to be because of noncompliance with diabetic diet and exercise, weight gain. Discussed with patient options between adding more medication versus working on lifestyle. Patient agrees to work on lifestyle. We discussed at length about healthy snacking, physical activity, foods to avoid. Continue Metformin 500 mg 2 tabs bid, Invokana 300 mg daily, Januvia 100 mg daily, Levemir 10 units at night. chest blood glucose one to 2 times per day and bring glucometer to next visit in 4 months. essential hypertension   blood pressure well-controlled on current medications. No microalbuminuria. mixed hyperlipidemia     LDL was adequate at 36 in 1/2020. Continue Crestor 5 mg daily. intermittent confusion   resolved after decreasing insulin.     Orders Placed This Encounter    AMB POC GLUCOSE BLOOD, BY GLUCOSE MONITORING DEVICE    AMB POC HEMOGLOBIN A1C    DISCONTD: glucose blood VI test strips (Accu-Chek Larissa Plus test strp) strip     Sig: Use to check glucose twice a day     Dispense:  100 Strip     Refill:  4    DISCONTD: canagliflozin (INVOKANA) 300 mg tablet     Sig: Take 1 Tab by mouth Daily (before breakfast) for 30 days. Dispense:  30 Tab     Refill:  4    DISCONTD: metFORMIN ER (GLUCOPHAGE XR) 500 mg tablet     Sig: Take 2 Tabs by mouth two (2) times a day for 30 days. Dispense:  360 Tab     Refill:  4    DISCONTD: Januvia 100 mg tablet     Sig: Take 1 Tab by mouth daily for 30 days. Dispense:  30 Tab     Refill:  4    DISCONTD: rosuvastatin (CRESTOR) 5 mg tablet     Sig: Take 1 Tab by mouth nightly for 30 days. Dispense:  90 Tab     Refill:  4    DISCONTD: Insulin Needles, Disposable, (Droplet Pen Needle) 31 gauge x 5/16\" ndle     Sig: Use to take insulin once a day     Dispense:  100 Pen Needle     Refill:  4    rosuvastatin (CRESTOR) 5 mg tablet     Sig: Take 1 Tab by mouth nightly for 90 days. Dispense:  90 Tab     Refill:  2    Januvia 100 mg tablet     Sig: Take 1 Tab by mouth daily for 90 days. Dispense:  90 Tab     Refill:  2    canagliflozin (INVOKANA) 300 mg tablet     Sig: Take 1 Tab by mouth Daily (before breakfast) for 90 days. Dispense:  90 Tab     Refill:  2    insulin detemir U-100 (Levemir FlexTouch U-100 Insuln) 100 unit/mL (3 mL) inpn     Sig: 10 Units by SubCUTAneous route nightly for 90 days. INJECT  40 UNITS  AT BEDTIME     Dispense:  3 Adjustable Dose Pre-filled Pen Syringe     Refill:  2    glucose blood VI test strips (Accu-Chek Larissa Plus test strp) strip     Sig: Use to check glucose twice a day, 90 days     Dispense:  300 Strip     Refill:  2    Insulin Needles, Disposable, (Droplet Pen Needle) 31 gauge x 5/16\" ndle     Sig: Use to take insulin once a day, 90 days     Dispense:  200 Pen Needle     Refill:  2    metFORMIN ER (GLUCOPHAGE XR) 500 mg tablet     Sig: Take 2 Tabs by mouth two (2) times a day for 90 days.      Dispense:  360 Tab     Refill:  2        Signed By: Jody Roberts MD     July 24, 2020      Return to clinic

## 2020-07-24 NOTE — PATIENT INSTRUCTIONS

## 2020-07-30 DIAGNOSIS — M51.36 DDD (DEGENERATIVE DISC DISEASE), LUMBAR: ICD-10-CM

## 2020-07-30 RX ORDER — MOMETASONE FUROATE 1 MG/G
OINTMENT TOPICAL
Qty: 45 G | Refills: 2 | Status: SHIPPED | OUTPATIENT
Start: 2020-07-30

## 2020-07-30 RX ORDER — GABAPENTIN 300 MG/1
CAPSULE ORAL
Qty: 270 CAP | Refills: 0 | Status: SHIPPED | OUTPATIENT
Start: 2020-07-30 | End: 2020-08-04 | Stop reason: SDUPTHER

## 2020-07-30 RX ORDER — MELOXICAM 15 MG/1
TABLET ORAL
Qty: 90 TAB | Refills: 3 | Status: SHIPPED | OUTPATIENT
Start: 2020-07-30 | End: 2021-06-09

## 2020-07-30 RX ORDER — LIDOCAINE HYDROCHLORIDE 20 MG/ML
JELLY TOPICAL
Qty: 30 ML | Refills: 0 | Status: SHIPPED | OUTPATIENT
Start: 2020-07-30 | End: 2020-10-12

## 2020-07-30 RX ORDER — TOPIRAMATE 25 MG/1
TABLET ORAL
Qty: 180 TAB | Refills: 0 | Status: SHIPPED | OUTPATIENT
Start: 2020-07-30 | End: 2020-10-12

## 2020-07-30 RX ORDER — MONTELUKAST SODIUM 10 MG/1
TABLET ORAL
Qty: 90 TAB | Refills: 0 | Status: SHIPPED | OUTPATIENT
Start: 2020-07-30 | End: 2020-10-12

## 2020-07-30 RX ORDER — HYDROCHLOROTHIAZIDE 12.5 MG/1
TABLET ORAL
Qty: 90 TAB | Refills: 3 | Status: SHIPPED | OUTPATIENT
Start: 2020-07-30 | End: 2021-06-09

## 2020-08-03 ENCOUNTER — TELEPHONE (OUTPATIENT)
Dept: ENDOCRINOLOGY | Age: 60
End: 2020-08-03

## 2020-08-03 NOTE — TELEPHONE ENCOUNTER
Ventura Isaac from pharmacy needs clarification on directions 10 units at night or 40 at midnight? You can call them @ [phone].

## 2020-08-04 DIAGNOSIS — M51.36 DDD (DEGENERATIVE DISC DISEASE), LUMBAR: ICD-10-CM

## 2020-08-04 RX ORDER — GABAPENTIN 300 MG/1
CAPSULE ORAL
Qty: 270 CAP | Refills: 0 | Status: SHIPPED | OUTPATIENT
Start: 2020-08-04 | End: 2020-11-10 | Stop reason: SDUPTHER

## 2020-08-06 ENCOUNTER — VIRTUAL VISIT (OUTPATIENT)
Dept: FAMILY MEDICINE CLINIC | Age: 60
End: 2020-08-06
Payer: MEDICARE

## 2020-08-06 DIAGNOSIS — L03.011 CELLULITIS OF RIGHT THUMB: Primary | ICD-10-CM

## 2020-08-06 PROCEDURE — 99213 OFFICE O/P EST LOW 20 MIN: CPT | Performed by: NURSE PRACTITIONER

## 2020-08-06 PROCEDURE — G9717 DOC PT DX DEP/BP F/U NT REQ: HCPCS | Performed by: NURSE PRACTITIONER

## 2020-08-06 PROCEDURE — G8428 CUR MEDS NOT DOCUMENT: HCPCS | Performed by: NURSE PRACTITIONER

## 2020-08-06 PROCEDURE — 3017F COLORECTAL CA SCREEN DOC REV: CPT | Performed by: NURSE PRACTITIONER

## 2020-08-06 NOTE — PROGRESS NOTES
Jerel Perdue is a 56 y.o. female who was seen by synchronous (real-time) audio-video technology on 8/6/2020 for No chief complaint on file. I spent at least 15 minutes on this visit with this established patient. 712  Subjective:   Seen at [facility] yesterday  Right thumb infection of the cuticle  Picked a hang nail that got infected  Given bactrim, feeling better today    Prior to Admission medications    Medication Sig Start Date End Date Taking? Authorizing Provider   gabapentin (NEURONTIN) 300 mg capsule TAKE 1 CAPSULE THREE TIMES DAILY 8/4/20  Yes Latonya Schmitz NP   montelukast (SINGULAIR) 10 mg tablet TAKE 1 TABLET EVERY DAY 7/30/20  Yes Latonya Schmitz NP   topiramate (TOPAMAX) 25 mg tablet TAKE 2 TABLETS EVERY NIGHT AT BEDTIME 7/30/20  Yes Latonya Schmitz NP   lidocaine (XYLOCAINE) 2 % jelly APPLY A THIN FILM TO AFFECTED AREA ONLY (LOW BACK AND NECK ONLY) ONE TIME DAILY AS NEEDED 7/30/20  Yes Latonya Schmitz NP   meloxicam (MOBIC) 15 mg tablet TAKE 1 TABLET EVERY DAY 7/30/20  Yes Latonya Schmitz NP   hydroCHLOROthiazide (HYDRODIURIL) 12.5 mg tablet TAKE 1 TABLET EVERY DAY 7/30/20  Yes Jennette Scheuermann, Melissa N, NP   mometasone (ELOCON) 0.1 % ointment APPLY  TO AFFECTED AREA TWO TIMES DAILY AS NEEDED FOR SKIN IRRITATION. 7/30/20  Yes Latonya Schmitz NP   rosuvastatin (CRESTOR) 5 mg tablet Take 1 Tab by mouth nightly for 90 days. 7/24/20 10/22/20 Yes Carolyn Block MD   Januvia 100 mg tablet Take 1 Tab by mouth daily for 90 days. 7/24/20 10/22/20 Yes Carolyn Block MD   canagliflozin (INVOKANA) 300 mg tablet Take 1 Tab by mouth Daily (before breakfast) for 90 days. 7/24/20 10/22/20 Yes Carolyn Block MD   insulin detemir U-100 (Levemir FlexTouch U-100 Insuln) 100 unit/mL (3 mL) inpn 10 Units by SubCUTAneous route nightly for 90 days.  INJECT  40 UNITS  AT BEDTIME 7/24/20 10/22/20 Yes Carolyn Block MD   glucose blood VI test strips (Accu-Chek Larissa Plus test strp) strip Use to check glucose twice a day, 90 days 7/24/20  Yes Norma Tenorio MD   Insulin Needles, Disposable, (Droplet Pen Needle) 31 gauge x 5/16\" ndle Use to take insulin once a day, 90 days 7/24/20  Yes Norma Tenorio MD   metFORMIN ER (GLUCOPHAGE XR) 500 mg tablet Take 2 Tabs by mouth two (2) times a day for 90 days. 7/24/20 10/22/20 Yes Norma Tenorio MD   Biotin 2,500 mcg cap Take  by mouth. Yes Provider, Historical   LORazepam (ATIVAN) 0.5 mg tablet TAKE 1 TABLET BY MOUTH TWICE DAILY AS NEEDED FOR ANXIETY - must last 30 days 3/5/20  Yes Becky Hendricks NP   cetirizine (ZYRTEC) 10 mg tablet TK 1 T PO D PRN 6/18/18  Yes Provider, Historical   BD SINGLE USE SWABS REGULAR padm USE WITH BLOOD SUGAR CHECKS 4/3/18  Yes Becky Hendricks NP   naloxone 2 mg/actuation spry Use 1 spray intranasally into 1 nostril. Use a new Narcan nasal spray for subsequent doses and administer into alternating nostrils. May repeat every 2 to 3 minutes as needed. 3/8/18  Yes Wash Sloop, NP   cholecalciferol, vitamin D3, 2,000 unit tab Take  by mouth. Yes Provider, Historical     Patient Active Problem List    Diagnosis Date Noted    Type 2 diabetes mellitus with diabetic neuropathy ([facility] 12/14/2018    Advanced care planning/counseling discussion 11/23/2015    Urinary frequency 10/05/2015    Diabetes mellitus type 2, controlled ([facility] 04/17/2015    NICOLE (obstructive sleep apnea) 11/05/2013    Diverticulosis 10/29/2013    DDD (degenerative disc disease), lumbar 07/30/2013    Depression 07/30/2013    GERD (gastroesophageal reflux disease) 07/02/2013    Dysphagia 07/02/2013    Status post gastric banding 07/02/2013    Lumbar stenosis 11/07/2011       ROS    Objective:   No flowsheet data found.    General: alert, cooperative, no distress   Mental  status: normal mood, behavior, speech, dress, motor activity, and thought processes, able to follow commands   HENT: NCAT   Neck: no visualized mass   Resp: no respiratory distress   Neuro: no gross deficits   Skin: no discoloration or lesions of concern on visible areas   Psychiatric: normal affect, consistent with stated mood, no evidence of hallucinations     Additional exam findings: none    Patient advised to use epsom water soaks  Finish bactrim as prescribed  Reviewed wound care      We discussed the expected course, resolution and complications of the diagnosis(es) in detail. Medication risks, benefits, costs, interactions, and alternatives were discussed as indicated. I advised her to contact the office if her condition worsens, changes or fails to improve as anticipated. She expressed understanding with the diagnosis(es) and plan. Amy Cho, who was evaluated through a patient-initiated, synchronous (real-time) audio-video encounter, and/or her healthcare decision maker, is aware that it is a billable service, with coverage as determined by her insurance carrier. She provided verbal consent to proceed: Yes, and patient identification was verified. It was conducted pursuant to the emergency declaration under the [facility], [address] [place] [address] authority and the [employer] Act. A caregiver was present when appropriate. Ability to conduct physical exam was limited. I was at home. The patient was at home.       Swapna Kincaid NP

## 2020-08-20 ENCOUNTER — TELEPHONE (OUTPATIENT)
Dept: ENDOCRINOLOGY | Age: 60
End: 2020-08-20

## 2020-08-20 NOTE — TELEPHONE ENCOUNTER
Called patients pharmacy for clarification that they faxed over for Metformin. After speaking with Dr. Prince Hernández and reviewing the patients last encounter and med list, I let them know that Dr. Prince Hernández sent a new script for Metformin  2 TABS BID. They said that she will get it in 3-5 business days. I tried to call patient to let her know, her cell phone is not set up for  and her home line rings busy.

## 2020-08-24 ENCOUNTER — TELEPHONE (OUTPATIENT)
Dept: ENDOCRINOLOGY | Age: 60
End: 2020-08-24

## 2020-08-25 ENCOUNTER — DOCUMENTATION ONLY (OUTPATIENT)
Dept: FAMILY MEDICINE CLINIC | Age: 60
End: 2020-08-25

## 2020-08-28 ENCOUNTER — DOCUMENTATION ONLY (OUTPATIENT)
Dept: FAMILY MEDICINE CLINIC | Age: 60
End: 2020-08-28

## 2020-09-15 ENCOUNTER — OFFICE VISIT (OUTPATIENT)
Dept: FAMILY MEDICINE CLINIC | Age: 60
End: 2020-09-15
Payer: MEDICARE

## 2020-09-15 VITALS
TEMPERATURE: 98.4 F | HEIGHT: 60 IN | HEART RATE: 84 BPM | RESPIRATION RATE: 12 BRPM | WEIGHT: 157 LBS | BODY MASS INDEX: 30.82 KG/M2 | DIASTOLIC BLOOD PRESSURE: 70 MMHG | SYSTOLIC BLOOD PRESSURE: 103 MMHG | OXYGEN SATURATION: 98 %

## 2020-09-15 DIAGNOSIS — M51.36 DDD (DEGENERATIVE DISC DISEASE), LUMBAR: Primary | ICD-10-CM

## 2020-09-15 DIAGNOSIS — F41.9 ANXIETY: ICD-10-CM

## 2020-09-15 PROCEDURE — G8417 CALC BMI ABV UP PARAM F/U: HCPCS | Performed by: NURSE PRACTITIONER

## 2020-09-15 PROCEDURE — 99213 OFFICE O/P EST LOW 20 MIN: CPT | Performed by: NURSE PRACTITIONER

## 2020-09-15 PROCEDURE — G9717 DOC PT DX DEP/BP F/U NT REQ: HCPCS | Performed by: NURSE PRACTITIONER

## 2020-09-15 PROCEDURE — G8427 DOCREV CUR MEDS BY ELIG CLIN: HCPCS | Performed by: NURSE PRACTITIONER

## 2020-09-15 PROCEDURE — 3017F COLORECTAL CA SCREEN DOC REV: CPT | Performed by: NURSE PRACTITIONER

## 2020-09-15 RX ORDER — TRAMADOL HYDROCHLORIDE 50 MG/1
50 TABLET ORAL
Qty: 60 TAB | Refills: 2 | Status: SHIPPED | OUTPATIENT
Start: 2020-09-15 | End: 2020-10-15

## 2020-09-15 RX ORDER — LORAZEPAM 0.5 MG/1
0.5 TABLET ORAL
Qty: 60 TAB | Refills: 2 | Status: SHIPPED | OUTPATIENT
Start: 2020-09-15 | End: 2020-12-02 | Stop reason: SDUPTHER

## 2020-09-15 NOTE — PROGRESS NOTES
HISTORY OF PRESENT ILLNESS  Armen Almanza is a 56 y.o. female. HPI  Patient is due for refills of meds for chronic pain and anxiety  She is moving to [facility] and needs rx printed until she can find a new pharmacy   No early refill requests, may cont to come here for care depending on resources for MD offices in [address] [place] [address]  A comprehensive review of system was obtained and negative except findings in the HPI    Visit Vitals  /70 (BP 1 Location: Right arm, BP Patient Position: Sitting)   Pulse 84   Temp 98.4 °F (36.9 °C) (Oral)   Resp 12   Ht 5' (1.524 m)   Wt 157 lb (71.2 kg)   LMP  (LMP Unknown)   SpO2 98%   BMI 30.66 kg/m²     Physical Exam  Vitals signs and nursing note reviewed. Constitutional:       Appearance: She is well-developed. Comments:      Neck:      Vascular: No JVD. Cardiovascular:      Rate and Rhythm: Normal rate and regular rhythm. Heart sounds: No murmur. No friction rub. No gallop. Pulmonary:      Effort: Pulmonary effort is normal. No respiratory distress. Breath sounds: Normal breath sounds. No wheezing. Skin:     General: Skin is warm. Neurological:      Mental Status: She is alert and oriented to person, place, and time. ASSESSMENT and PLAN  Encounter Diagnoses   Name Primary?  DDD (degenerative disc disease), lumbar Yes    Anxiety      Orders Placed This Encounter    traMADoL (ULTRAM) 50 mg tablet    LORazepam (ATIVAN) 0.5 mg tablet     Refills updated today  follow up 3 mo  I have discussed the diagnosis with the patient and the intended plan as seen in the above orders. The patient has received an after-visit summary and questions were answered concerning future plans. Patient conveyed understanding of the plan at the time of the visit.     Sol Ann, MSN, ANP  9/15/2020

## 2020-09-15 NOTE — PROGRESS NOTES
Chief Complaint   Patient presents with    Medication Refill     Pt in office today for med refill    1. Have you been to the ER, urgent care clinic since your last visit? Hospitalized since your last visit? No    2. Have you seen or consulted any other health care providers outside of the [facility] since your last visit? Include any pap smears or colon screening.  No     Pt has no other concerns

## 2020-10-12 RX ORDER — MONTELUKAST SODIUM 10 MG/1
TABLET ORAL
Qty: 90 TAB | Refills: 0 | Status: SHIPPED | OUTPATIENT
Start: 2020-10-12 | End: 2020-12-21

## 2020-10-12 RX ORDER — TOPIRAMATE 25 MG/1
TABLET ORAL
Qty: 180 TAB | Refills: 0 | Status: SHIPPED | OUTPATIENT
Start: 2020-10-12 | End: 2021-05-21 | Stop reason: ALTCHOICE

## 2020-10-12 RX ORDER — LIDOCAINE HYDROCHLORIDE 20 MG/ML
JELLY TOPICAL
Qty: 30 ML | Refills: 0 | Status: SHIPPED | OUTPATIENT
Start: 2020-10-12 | End: 2020-12-21

## 2020-10-14 RX ORDER — PHENOL/SODIUM PHENOLATE
20 AEROSOL, SPRAY (ML) MUCOUS MEMBRANE DAILY
Qty: 90 TAB | Refills: 3 | Status: SHIPPED | OUTPATIENT
Start: 2020-10-14 | End: 2020-11-24 | Stop reason: ALTCHOICE

## 2020-11-10 ENCOUNTER — TELEPHONE (OUTPATIENT)
Dept: FAMILY MEDICINE CLINIC | Age: 60
End: 2020-11-10

## 2020-11-10 DIAGNOSIS — M51.36 DDD (DEGENERATIVE DISC DISEASE), LUMBAR: ICD-10-CM

## 2020-11-10 RX ORDER — GABAPENTIN 300 MG/1
CAPSULE ORAL
Qty: 270 CAP | Refills: 0 | Status: SHIPPED | OUTPATIENT
Start: 2020-11-10 | End: 2020-12-29 | Stop reason: SDUPTHER

## 2020-11-10 NOTE — TELEPHONE ENCOUNTER
[facility] mail order pharmacy calling to get refill of medication Gabapentin. You can send back via [employer]. If you have any questions they can be reached at [phone].

## 2020-11-24 ENCOUNTER — OFFICE VISIT (OUTPATIENT)
Dept: ENDOCRINOLOGY | Age: 60
End: 2020-11-24
Payer: MEDICARE

## 2020-11-24 VITALS
HEIGHT: 64 IN | WEIGHT: 159.4 LBS | HEART RATE: 103 BPM | BODY MASS INDEX: 27.21 KG/M2 | TEMPERATURE: 98.7 F | DIASTOLIC BLOOD PRESSURE: 86 MMHG | SYSTOLIC BLOOD PRESSURE: 127 MMHG | OXYGEN SATURATION: 97 %

## 2020-11-24 DIAGNOSIS — E11.69 TYPE 2 DIABETES MELLITUS WITH OTHER SPECIFIED COMPLICATION, WITH LONG-TERM CURRENT USE OF INSULIN (HCC): ICD-10-CM

## 2020-11-24 DIAGNOSIS — Z79.4 TYPE 2 DIABETES MELLITUS WITH OTHER SPECIFIED COMPLICATION, WITH LONG-TERM CURRENT USE OF INSULIN (HCC): ICD-10-CM

## 2020-11-24 PROCEDURE — 99214 OFFICE O/P EST MOD 30 MIN: CPT | Performed by: INTERNAL MEDICINE

## 2020-11-24 RX ORDER — METFORMIN HYDROCHLORIDE 500 MG/1
TABLET ORAL
Qty: 360 TAB | Refills: 1 | Status: SHIPPED | OUTPATIENT
Start: 2020-11-24 | End: 2021-03-15

## 2020-11-24 RX ORDER — PEN NEEDLE, DIABETIC 30 GX3/16"
NEEDLE, DISPOSABLE MISCELLANEOUS
Qty: 200 PEN NEEDLE | Refills: 2 | Status: SHIPPED | OUTPATIENT
Start: 2020-11-24 | End: 2021-05-21 | Stop reason: SDUPTHER

## 2020-11-24 RX ORDER — OMEPRAZOLE 20 MG/1
20 CAPSULE, DELAYED RELEASE ORAL DAILY
COMMUNITY
End: 2021-08-04

## 2020-11-24 RX ORDER — BLOOD SUGAR DIAGNOSTIC
STRIP MISCELLANEOUS
Qty: 300 STRIP | Refills: 2 | Status: SHIPPED | OUTPATIENT
Start: 2020-11-24 | End: 2021-08-02

## 2020-11-24 NOTE — PROGRESS NOTES
History and Physical    Patient: Nidia Stafford MRN: [id number]  SSN: [id number]    YOB: 1960  Age: 56 y.o. Sex: female      Subjective:      Nidia Stafford is a 56 y.o. female with past medical history of hypertension, hyperlipidemia, obesity status post lap band in 2008 (lost 100 pounds) is here for follow-up of type 2 diabetes mellitus. She remains in primary care of Dr. Karen Bartholomew. she has not had any more episodes of confusion since we decreased Levemir dose. At the last visit it was noted that patient had worsening of glucose control. At that time she was not following diabetic diet and not exercising. Since then patient has gone back to following diabetic diet and she is walking regularly for exercise. She is taking metformin 500 mg 2 tablets twice a day, Invokana 300 mg daily, Januvia 100 mg daily, Levemir 10 units at bedtime. Denies any hypoglycemia. Check blood glucose sometimes. She had diabetic eye exam in August 2020 and everything looked okay. Glucometer reading: Patient is not checking blood glucose much.   There are only a few readings, ranging from 128 to 204 mg/dL    Updated diabetes history:  · Diagnosis: 5 years  · Current treatment: Metformin 500 mg 2 tabs bid, Invokana 300 mg daily, Januvia 100 mg daily, Levemir 10 units at night  · Past treatment: no  · Glucose checks: 1-2 times a day as above  · Hyperglycemia: rarely  · Hypoglycemia: yes  · Meals per day: 3, breakfast: egg/breakfast bar, lunch: squash and string beans, cabbage and chicken, dinner: chicken, shrimp, fish, snacks: bakes pretzels  · Exercise: sometimes walks when it is warm  · DM related hospitalizations: no  Complications of DM:  · CAD: no  · CVA: no  · PVD: no  · Amputations: no   · Retinopathy: no; last exam was 12/2018  · Gastropathy: no  · Nephropathy: no  · Neuropathy: no  Medications:  · Statin: crestor 5 mg  · ACE-I: no  · ASA: no  · Diabetes education: no      Past Medical History:   Diagnosis Date    Arrhythmia     \"IRREG HEARTBEAT\"  (DR MARTINEZ)    Arthritis     Chronic pain     BACK    Depression     Diabetes ([facility] [address]     Dyspepsia and other specified disorders of function of stomach     GERD (gastroesophageal reflux disease)     Headache     Headache(784.0)     Hypertension     Hypertension 7/14/2020    Hyperthyroidism     Morbid obesity ([facility] [address]     Psychiatric disorder     DEPRESSION    Sleep apnea     no CPAP    Stool color black     Sun-damaged skin     Tanning bed exposure      Past Surgical History:   Procedure Laterality Date    HAND/FINGER SURGERY UNLISTED      x3    HX BACK SURGERY      x4- 2001,2003,2009,2011    HX CHOLECYSTECTOMY  2002    HX GASTRIC BYPASS  5/2008    LAP BAND ( [address])    HX HEENT  2006    LASIK BILAT    HX HYSTERECTOMY  2005    HX ORTHOPAEDIC  2006,2010    HANDS  RIGHT X2; LEFT X1    HX TONSILLECTOMY  1964    HX TUBAL LIGATION  [id number] REV    x2 and a reversal    HX UROLOGICAL  04/29/2015    urethra sling    IR INJ SPINE FLUORO GUIDED  8/3/2020    LAP, PLACE ADJUST GASTR BAND  5/2008      Family History   Problem Relation Age of Onset    Post-op Nausea/Vomiting Mother     Delayed Awakening Mother     Psychiatric Disorder Mother     Hypertension Mother     Stroke Mother     Headache Mother     Post-op Nausea/Vomiting Daughter     Delayed Awakening Daughter     Cancer Father     Hypertension Father     Cancer Maternal Grandmother     Psychiatric Disorder Maternal Grandmother     Dementia Paternal Grandmother     Dementia Paternal Grandfather     Parkinson's Disease Paternal Grandfather      Social History     Tobacco Use    Smoking status: Never Smoker    Smokeless tobacco: Never Used   Substance Use Topics    Alcohol use: No      Prior to Admission medications    Medication Sig Start Date End Date Taking? Authorizing Provider   omeprazole (PRILOSEC) 20 mg capsule Take 20 mg by mouth daily.    Yes Provider, Historical   SITagliptin (Januvia) 100 mg tablet TAKE 1 TABLET EVERY DAY 11/24/20  Yes Mariella Cobb MD   Insulin Needles, Disposable, (Droplet Pen Needle) 31 gauge x 5/16\" ndle Use to take insulin once a day, 90 days 11/24/20  Yes Mariella Cobb MD   glucose blood VI test strips (Accu-Chek Larissa Plus test strp) strip Use to check glucose twice a day, 90 days 11/24/20  Yes Mariella Cobb MD   metFORMIN (GLUCOPHAGE) 500 mg tablet Take 2 tabs twice a day with meals, 90 days 11/24/20  Yes Mariella Cobb MD   canagliflozin (Invokana) 300 mg tablet Take 1 Tab by mouth Daily (before breakfast) for 90 days. 11/24/20 2/22/21 Yes Mariella Cobb MD   insulin detemir U-100 (LEVEMIR FLEXTOUCH) 100 unit/mL (3 mL) inpn Inject 10 units at bedtime, 90 days 11/24/20  Yes Mariella Cobb MD   gabapentin (NEURONTIN) 300 mg capsule TAKE 1 CAPSULE THREE TIMES DAILY 11/10/20  Yes Marguerite Wade NP   lidocaine (XYLOCAINE) 2 % jelly APPLY A THIN FILM TO AFFECTED AREA ONLY (LOW BACK AND NECK ONLY) ONE TIME DAILY AS NEEDED 10/12/20  Yes Marguerite Wade NP   topiramate (TOPAMAX) 25 mg tablet TAKE 2 TABLETS EVERY NIGHT AT BEDTIME 10/12/20  Yes Marguerite Wdae NP   montelukast (SINGULAIR) 10 mg tablet TAKE 1 TABLET EVERY DAY 10/12/20  Yes Marguerite Wade NP   LORazepam (ATIVAN) 0.5 mg tablet Take 1 Tab by mouth two (2) times daily as needed for Anxiety. Max Daily Amount: 1 mg. 9/15/20  Yes Marguerite Wade NP   meloxicam (MOBIC) 15 mg tablet TAKE 1 TABLET EVERY DAY 7/30/20  Yes Marguerite Wade NP   hydroCHLOROthiazide (HYDRODIURIL) 12.5 mg tablet TAKE 1 TABLET EVERY DAY 7/30/20  Yes Zoë Rosario NP   mometasone (ELOCON) 0.1 % ointment APPLY  TO AFFECTED AREA TWO TIMES DAILY AS NEEDED FOR SKIN IRRITATION. 7/30/20  Yes Marguerite Wade NP   Biotin 2,500 mcg cap Take  by mouth.    Yes Provider, Historical   cetirizine (ZYRTEC) 10 mg tablet TK 1 T PO D PRN 6/18/18  Yes Provider, Historical   BD SINGLE USE SWABS REGULAR padm USE WITH BLOOD SUGAR CHECKS 4/3/18  Yes Cristine Moya NP   naloxone 2 mg/actuation spry Use 1 spray intranasally into 1 nostril. Use a new Narcan nasal spray for subsequent doses and administer into alternating nostrils. May repeat every 2 to 3 minutes as needed. 3/8/18  Yes Kimmy Leyva NP   cholecalciferol, vitamin D3, 2,000 unit tab Take  by mouth. Yes Provider, Historical        No Known Allergies    Review of Systems:  ROS    A comprehensive review of systems was preformed and it is negative except mentioned in HPI    Objective:     Vitals:    11/24/20 1554   BP: 127/86   Pulse: (!) 103   Temp: 98.7 °F (37.1 °C)   TempSrc: Temporal   SpO2: 97%   Weight: 159 lb 6.4 oz (72.3 kg)   Height: 5' 4\" (1.626 m)        Physical Exam:    Physical Exam  Vitals signs and nursing note reviewed. Constitutional:       Appearance: Normal appearance. HENT:      Head: Normocephalic and atraumatic. Eyes:      Extraocular Movements: Extraocular movements intact. Cardiovascular:      Rate and Rhythm: Normal rate and regular rhythm. Pulmonary:      Effort: Pulmonary effort is normal.      Breath sounds: Normal breath sounds. Skin:     General: Skin is warm and dry. Neurological:      Mental Status: She is alert. Psychiatric:         Mood and Affect: Mood normal.         Behavior: Behavior normal.         Thought Content: Thought content normal.         Judgment: Judgment normal.        diabetic foot exam:  Bilateral diabetic foot exam was performed today. Dorsalis pedis pulses 2+ bilaterally. Monofilament sensation normal bilaterally. No ulcers or skin breakdown. Labs and Imaging:  Results for Jorge A Kinsey (MRN [id number]) as of 7/24/2020 14:23   Ref. Range 7/24/2020 14:16   Hemoglobin A1c (POC) Latest Units: % 7.9   Results for Jorge A Kinsey (MRN [id number]) as of 7/24/2020 14:23   Ref.  Range 7/24/2020 14:16   GLUCOSE,FAST - POC Latest Units: mg/dL 165     Last 3 Recorded Weights in this Encounter    11/24/20 1554   Weight: 159 lb 6.4 oz (72.3 kg)        Lab Results   Component Value Date/Time    Hemoglobin A1c 6.1 (H) 09/16/2019 02:28 PM    Hemoglobin A1c 7.3 (H) 12/14/2018 11:12 AM    Hemoglobin A1c 7.0 (H) 09/17/2018 10:45 AM    Hemoglobin A1c, External 6.3 01/24/2020        Assessment:     Patient Active Problem List   Diagnosis Code    Lumbar stenosis M48.061    GERD (gastroesophageal reflux disease) K21.9    Dysphagia R13.10    Status post gastric banding Z98.84    DDD (degenerative disc disease), lumbar M51.36    Depression F32.9    Diverticulosis K57.90    NICOLE (obstructive sleep apnea) G47.33    Diabetes mellitus ([facility] [address] E11.9    Urinary frequency R35.0    Advanced care planning/counseling discussion Z71.89    Type 2 diabetes mellitus with diabetic neuropathy ([facility] [address] E11.40           Plan:   type 2 diabetes mellitus uncontrolled   Hemoglobin A1c was 7.3% on 65410738, 6.8% on 4-8-2019, 6.8% in 7-, 6.3% on 1-, 7.9% on 7-, 6.4% today  Fingerstick blood glucose is 91 mg/dL in my office today. Up to date with diabetes related annual labs: yes 1/2020  Up to date with diabetic eye exam: yes 8/2020    plan:  Glucose control is adequate. Continue current medications. Check labs before next visit in 6 months. essential hypertension   blood pressure well-controlled on current medications. No microalbuminuria. mixed hyperlipidemia   LDL was adequate at 36 in 1/2020. Continue Crestor 5 mg daily. intermittent confusion   resolved after decreasing insulin.     Orders Placed This Encounter    LIPID PANEL     Standing Status:   Future     Standing Expiration Date:   4/80/4845    METABOLIC PANEL, COMPREHENSIVE     Standing Status:   Future     Standing Expiration Date:   5/24/2021    MICROALBUMIN, UR, RAND W/ MICROALB/CREAT RATIO     Standing Status:   Future     Standing Expiration Date:   5/24/2021    TSH RFX ON ABNORMAL TO FREE T4     Standing Status: Future     Standing Expiration Date:   5/24/2021    SITagliptin (Januvia) 100 mg tablet     Sig: TAKE 1 TABLET EVERY DAY     Dispense:  90 Tab     Refill:  1    Insulin Needles, Disposable, (Droplet Pen Needle) 31 gauge x 5/16\" ndle     Sig: Use to take insulin once a day, 90 days     Dispense:  200 Pen Needle     Refill:  2    glucose blood VI test strips (Accu-Chek Larissa Plus test strp) strip     Sig: Use to check glucose twice a day, 90 days     Dispense:  300 Strip     Refill:  2    metFORMIN (GLUCOPHAGE) 500 mg tablet     Sig: Take 2 tabs twice a day with meals, 90 days     Dispense:  360 Tab     Refill:  1    canagliflozin (Invokana) 300 mg tablet     Sig: Take 1 Tab by mouth Daily (before breakfast) for 90 days.      Dispense:  90 Tab     Refill:  1    insulin detemir U-100 (LEVEMIR FLEXTOUCH) 100 unit/mL (3 mL) inpn     Sig: Inject 10 units at bedtime, 90 days     Dispense:  3 Adjustable Dose Pre-filled Pen Syringe     Refill:  1        Signed By: Estiven De Leon MD     November 24, 2020      Return to clinic 6 months

## 2020-12-02 ENCOUNTER — VIRTUAL VISIT (OUTPATIENT)
Dept: FAMILY MEDICINE CLINIC | Age: 60
End: 2020-12-02
Payer: MEDICARE

## 2020-12-02 DIAGNOSIS — F41.9 ANXIETY: ICD-10-CM

## 2020-12-02 DIAGNOSIS — M51.36 DDD (DEGENERATIVE DISC DISEASE), LUMBAR: Primary | ICD-10-CM

## 2020-12-02 PROCEDURE — G9899 SCRN MAM PERF RSLTS DOC: HCPCS | Performed by: NURSE PRACTITIONER

## 2020-12-02 PROCEDURE — 3017F COLORECTAL CA SCREEN DOC REV: CPT | Performed by: NURSE PRACTITIONER

## 2020-12-02 PROCEDURE — 99213 OFFICE O/P EST LOW 20 MIN: CPT | Performed by: NURSE PRACTITIONER

## 2020-12-02 PROCEDURE — G8428 CUR MEDS NOT DOCUMENT: HCPCS | Performed by: NURSE PRACTITIONER

## 2020-12-02 PROCEDURE — G9717 DOC PT DX DEP/BP F/U NT REQ: HCPCS | Performed by: NURSE PRACTITIONER

## 2020-12-02 RX ORDER — LORAZEPAM 0.5 MG/1
0.5 TABLET ORAL
Qty: 60 TAB | Refills: 2 | Status: SHIPPED | OUTPATIENT
Start: 2020-12-02

## 2020-12-02 RX ORDER — TRAMADOL HYDROCHLORIDE 50 MG/1
50 TABLET ORAL
Qty: 60 TAB | Refills: 2 | Status: SHIPPED | OUTPATIENT
Start: 2020-12-02 | End: 2021-01-01

## 2020-12-02 NOTE — PROGRESS NOTES
Devora Hatchet is a 56 y.o. female who was seen by synchronous (real-time) audio-video technology on 12/2/2020 for No chief complaint on file. I spent at least 15 minutes on this visit with this established patient. 712  Subjective:   Patient is here for follow up chronic pain management and anxiety   Takes lorazepam and tramadol scheduled daily  No adr/se of med and no early refill requests      Prior to Admission medications    Medication Sig Start Date End Date Taking? Authorizing Provider   LORazepam (ATIVAN) 0.5 mg tablet Take 1 Tab by mouth two (2) times daily as needed for Anxiety. Max Daily Amount: 1 mg. 12/2/20  Yes Polina Carmona NP   traMADoL (ULTRAM) 50 mg tablet Take 1 Tab by mouth two (2) times daily as needed for Pain for up to 30 days. Max Daily Amount: 100 mg. 12/2/20 1/1/21 Yes Polina Carmona NP   omeprazole (PRILOSEC) 20 mg capsule Take 20 mg by mouth daily. Provider, Historical   SITagliptin (Januvia) 100 mg tablet TAKE 1 TABLET EVERY DAY 11/24/20   Sonja Ledesma MD   Insulin Needles, Disposable, (Droplet Pen Needle) 31 gauge x 5/16\" ndle Use to take insulin once a day, 90 days 11/24/20   Sonja Ledesma MD   glucose blood VI test strips (Accu-Chek Larissa Plus test strp) strip Use to check glucose twice a day, 90 days 11/24/20   Sonja Ledesma MD   metFORMIN (GLUCOPHAGE) 500 mg tablet Take 2 tabs twice a day with meals, 90 days 11/24/20   Sonja Ledesma MD   canagliflozin (Invokana) 300 mg tablet Take 1 Tab by mouth Daily (before breakfast) for 90 days.  11/24/20 2/22/21  Sonja Ledesma MD   insulin detemir U-100 (LEVEMIR FLEXTOUCH) 100 unit/mL (3 mL) inpn Inject 10 units at bedtime, 90 days 11/24/20   Sonja Ledesma MD   gabapentin (NEURONTIN) 300 mg capsule TAKE 1 CAPSULE THREE TIMES DAILY 11/10/20   Polina Carmona NP   lidocaine (XYLOCAINE) 2 % jelly APPLY A THIN FILM TO AFFECTED AREA ONLY (LOW BACK AND NECK ONLY) ONE TIME DAILY AS NEEDED 10/12/20   Polina Carmona, NP topiramate (TOPAMAX) 25 mg tablet TAKE 2 TABLETS EVERY NIGHT AT BEDTIME 10/12/20   Kyra Garland NP   montelukast (SINGULAIR) 10 mg tablet TAKE 1 TABLET EVERY DAY 10/12/20   Kyra Garland, NAYELI   LORazepam (ATIVAN) 0.5 mg tablet Take 1 Tab by mouth two (2) times daily as needed for Anxiety. Max Daily Amount: 1 mg. 9/15/20 12/2/20  Kyra Garland, NAYELI   meloxicam (MOBIC) 15 mg tablet TAKE 1 TABLET EVERY DAY 7/30/20   Kyra Garland, NP   hydroCHLOROthiazide (HYDRODIURIL) 12.5 mg tablet TAKE 1 TABLET EVERY DAY 7/30/20   Kyra Garland, NP   mometasone (ELOCON) 0.1 % ointment APPLY  TO AFFECTED AREA TWO TIMES DAILY AS NEEDED FOR SKIN IRRITATION. 7/30/20   Kyra Garland, NAYELI   Biotin 2,500 mcg cap Take  by mouth. Provider, Historical   cetirizine (ZYRTEC) 10 mg tablet TK 1 T PO D PRN 6/18/18   Provider, Historical   BD SINGLE USE SWABS REGULAR padm USE WITH BLOOD SUGAR CHECKS 4/3/18   Kyra Garland NP   naloxone 2 mg/actuation spry Use 1 spray intranasally into 1 nostril. Use a new Narcan nasal spray for subsequent doses and administer into alternating nostrils. May repeat every 2 to 3 minutes as needed. 3/8/18   Sunday Nelson NP   cholecalciferol, vitamin D3, 2,000 unit tab Take  by mouth.     Provider, Historical     Patient Active Problem List    Diagnosis Date Noted    Type 2 diabetes mellitus with diabetic neuropathy ([facility] [address] 12/14/2018    Advanced care planning/counseling discussion 11/23/2015    Urinary frequency 10/05/2015    Diabetes mellitus ([facility] [address] 04/17/2015    NICOLE (obstructive sleep apnea) 11/05/2013    Diverticulosis 10/29/2013    DDD (degenerative disc disease), lumbar 07/30/2013    Depression 07/30/2013    GERD (gastroesophageal reflux disease) 07/02/2013    Dysphagia 07/02/2013    Status post gastric banding 07/02/2013    Lumbar stenosis 11/07/2011       ROS  A comprehensive review of system was obtained and negative except findings in the HPI    Objective:   No flowsheet data found. General: alert, cooperative, no distress   Mental  status: normal mood, behavior, speech, dress, motor activity, and thought processes, able to follow commands   HENT: NCAT   Neck: no visualized mass   Resp: no respiratory distress   Neuro: no gross deficits   Skin: no discoloration or lesions of concern on visible areas   Psychiatric: normal affect, consistent with stated mood, no evidence of hallucinations     Additional exam findings: We discussed the expected course, resolution and complications of the diagnosis(es) in detail. Medication risks, benefits, costs, interactions, and alternatives were discussed as indicated. I advised her to contact the office if her condition worsens, changes or fails to improve as anticipated. She expressed understanding with the diagnosis(es) and plan. Vinayak Calvert, who was evaluated through a patient-initiated, synchronous (real-time) audio-video encounter, and/or her healthcare decision maker, is aware that it is a billable service, with coverage as determined by her insurance carrier. She provided verbal consent to proceed: Yes, and patient identification was verified. It was conducted pursuant to the emergency declaration under the [facility], [address] [place] [address] authority and the [employer] General Act. A caregiver was present when appropriate. Ability to conduct physical exam was limited. I was at home. The patient was at home.       Sarah Shah NP

## 2020-12-21 RX ORDER — MONTELUKAST SODIUM 10 MG/1
TABLET ORAL
Qty: 90 TAB | Refills: 0 | Status: SHIPPED | OUTPATIENT
Start: 2020-12-21 | End: 2021-03-14

## 2020-12-21 RX ORDER — LIDOCAINE HYDROCHLORIDE 20 MG/ML
JELLY TOPICAL
Qty: 30 ML | Refills: 0 | Status: SHIPPED | OUTPATIENT
Start: 2020-12-21 | End: 2021-02-18

## 2020-12-23 ENCOUNTER — TELEPHONE (OUTPATIENT)
Dept: ENDOCRINOLOGY | Age: 60
End: 2020-12-23

## 2020-12-23 DIAGNOSIS — Z79.4 TYPE 2 DIABETES MELLITUS WITH OTHER SPECIFIED COMPLICATION, WITH LONG-TERM CURRENT USE OF INSULIN (HCC): Primary | ICD-10-CM

## 2020-12-23 DIAGNOSIS — E11.69 TYPE 2 DIABETES MELLITUS WITH OTHER SPECIFIED COMPLICATION, WITH LONG-TERM CURRENT USE OF INSULIN (HCC): Primary | ICD-10-CM

## 2020-12-24 RX ORDER — ISOPROPYL ALCOHOL 70 ML/100ML
SWAB TOPICAL
Qty: 300 PAD | Refills: 1 | Status: SHIPPED | OUTPATIENT
Start: 2020-12-24 | End: 2021-09-27

## 2020-12-29 DIAGNOSIS — M51.36 DDD (DEGENERATIVE DISC DISEASE), LUMBAR: ICD-10-CM

## 2020-12-29 RX ORDER — GABAPENTIN 300 MG/1
CAPSULE ORAL
Qty: 270 CAP | Refills: 0 | Status: SHIPPED | OUTPATIENT
Start: 2020-12-29 | End: 2021-02-18 | Stop reason: SDUPTHER

## 2021-02-09 ENCOUNTER — DOCUMENTATION ONLY (OUTPATIENT)
Dept: FAMILY MEDICINE CLINIC | Age: 61
End: 2021-02-09

## 2021-02-09 NOTE — PROGRESS NOTES
[facility]'s medical records request was faxed to Alfreda at [phone] to be processed on 02/09/2021

## 2021-02-18 DIAGNOSIS — M51.36 DDD (DEGENERATIVE DISC DISEASE), LUMBAR: ICD-10-CM

## 2021-02-18 RX ORDER — LIDOCAINE HYDROCHLORIDE 20 MG/ML
JELLY TOPICAL
Qty: 30 ML | Refills: 0 | Status: SHIPPED | OUTPATIENT
Start: 2021-02-18

## 2021-02-18 RX ORDER — GABAPENTIN 300 MG/1
CAPSULE ORAL
Qty: 270 CAP | Refills: 0 | Status: SHIPPED | OUTPATIENT
Start: 2021-02-18 | End: 2021-05-17 | Stop reason: SDUPTHER

## 2021-02-25 LAB
ALBUMIN SERPL-MCNC: 4.7 G/DL (ref 3.8–4.8)
ALBUMIN/CREAT UR: 6 MG/G CREAT (ref 0–29)
ALBUMIN/GLOB SERPL: 1.8 {RATIO} (ref 1.2–2.2)
ALP SERPL-CCNC: 87 IU/L (ref 39–117)
ALT SERPL-CCNC: 13 IU/L (ref 0–32)
AST SERPL-CCNC: 15 IU/L (ref 0–40)
BILIRUB SERPL-MCNC: 1.5 MG/DL (ref 0–1.2)
BUN SERPL-MCNC: 14 MG/DL (ref 8–27)
BUN/CREAT SERPL: 14 (ref 12–28)
CALCIUM SERPL-MCNC: 10.1 MG/DL (ref 8.7–10.3)
CHLORIDE SERPL-SCNC: 100 MMOL/L (ref 96–106)
CHOLEST SERPL-MCNC: 151 MG/DL (ref 100–199)
CO2 SERPL-SCNC: 28 MMOL/L (ref 20–29)
CREAT SERPL-MCNC: 1 MG/DL (ref 0.57–1)
CREAT UR-MCNC: 117.2 MG/DL
GLOBULIN SER CALC-MCNC: 2.6 G/DL (ref 1.5–4.5)
GLUCOSE SERPL-MCNC: 101 MG/DL (ref 65–99)
HDLC SERPL-MCNC: 62 MG/DL
LDLC SERPL CALC-MCNC: 60 MG/DL (ref 0–99)
MICROALBUMIN UR-MCNC: 7.4 UG/ML
POTASSIUM SERPL-SCNC: 4.7 MMOL/L (ref 3.5–5.2)
PROT SERPL-MCNC: 7.3 G/DL (ref 6–8.5)
SODIUM SERPL-SCNC: 143 MMOL/L (ref 134–144)
TRIGL SERPL-MCNC: 175 MG/DL (ref 0–149)
TSH SERPL DL<=0.005 MIU/L-ACNC: 2.32 UIU/ML (ref 0.45–4.5)
VLDLC SERPL CALC-MCNC: 29 MG/DL (ref 5–40)

## 2021-03-10 NOTE — PROGRESS NOTES
Phone call to patient. No answer. I left VM letting the patient know that her lab results are available to her I her My Chart.

## 2021-03-14 RX ORDER — MONTELUKAST SODIUM 10 MG/1
TABLET ORAL
Qty: 90 TAB | Refills: 0 | Status: SHIPPED | OUTPATIENT
Start: 2021-03-14 | End: 2021-03-29

## 2021-03-29 RX ORDER — MONTELUKAST SODIUM 10 MG/1
TABLET ORAL
Qty: 90 TAB | Refills: 0 | Status: SHIPPED | OUTPATIENT
Start: 2021-03-29 | End: 2021-06-09

## 2021-05-17 DIAGNOSIS — M51.36 DDD (DEGENERATIVE DISC DISEASE), LUMBAR: ICD-10-CM

## 2021-05-17 RX ORDER — GABAPENTIN 300 MG/1
CAPSULE ORAL
Qty: 270 CAP | Refills: 0 | Status: SHIPPED | OUTPATIENT
Start: 2021-05-17 | End: 2021-06-14 | Stop reason: SDUPTHER

## 2021-05-21 ENCOUNTER — OFFICE VISIT (OUTPATIENT)
Dept: ENDOCRINOLOGY | Age: 61
End: 2021-05-21
Payer: MEDICARE

## 2021-05-21 VITALS
TEMPERATURE: 98 F | WEIGHT: 167.7 LBS | HEART RATE: 73 BPM | BODY MASS INDEX: 28.63 KG/M2 | OXYGEN SATURATION: 100 % | HEIGHT: 64 IN | SYSTOLIC BLOOD PRESSURE: 125 MMHG | DIASTOLIC BLOOD PRESSURE: 89 MMHG

## 2021-05-21 DIAGNOSIS — Z79.4 TYPE 2 DIABETES MELLITUS WITH OTHER SPECIFIED COMPLICATION, WITH LONG-TERM CURRENT USE OF INSULIN (HCC): Primary | ICD-10-CM

## 2021-05-21 DIAGNOSIS — E11.69 TYPE 2 DIABETES MELLITUS WITH OTHER SPECIFIED COMPLICATION, WITH LONG-TERM CURRENT USE OF INSULIN (HCC): Primary | ICD-10-CM

## 2021-05-21 LAB
GLUCOSE POC: 177 MG/DL
HBA1C MFR BLD HPLC: 7.4 %

## 2021-05-21 PROCEDURE — 99214 OFFICE O/P EST MOD 30 MIN: CPT | Performed by: INTERNAL MEDICINE

## 2021-05-21 PROCEDURE — 83036 HEMOGLOBIN GLYCOSYLATED A1C: CPT | Performed by: INTERNAL MEDICINE

## 2021-05-21 PROCEDURE — 3051F HG A1C>EQUAL 7.0%<8.0%: CPT | Performed by: INTERNAL MEDICINE

## 2021-05-21 PROCEDURE — G9717 DOC PT DX DEP/BP F/U NT REQ: HCPCS | Performed by: INTERNAL MEDICINE

## 2021-05-21 PROCEDURE — G9899 SCRN MAM PERF RSLTS DOC: HCPCS | Performed by: INTERNAL MEDICINE

## 2021-05-21 PROCEDURE — 2022F DILAT RTA XM EVC RTNOPTHY: CPT | Performed by: INTERNAL MEDICINE

## 2021-05-21 PROCEDURE — 82962 GLUCOSE BLOOD TEST: CPT | Performed by: INTERNAL MEDICINE

## 2021-05-21 PROCEDURE — 3017F COLORECTAL CA SCREEN DOC REV: CPT | Performed by: INTERNAL MEDICINE

## 2021-05-21 PROCEDURE — G8417 CALC BMI ABV UP PARAM F/U: HCPCS | Performed by: INTERNAL MEDICINE

## 2021-05-21 PROCEDURE — G8427 DOCREV CUR MEDS BY ELIG CLIN: HCPCS | Performed by: INTERNAL MEDICINE

## 2021-05-21 RX ORDER — LANCETS
EACH MISCELLANEOUS
Qty: 100 EACH | Refills: 1 | Status: SHIPPED | OUTPATIENT
Start: 2021-05-21 | End: 2021-09-27

## 2021-05-21 RX ORDER — TRAMADOL HYDROCHLORIDE 50 MG/1
TABLET ORAL
COMMUNITY
Start: 2021-05-10

## 2021-05-21 RX ORDER — CANAGLIFLOZIN 300 MG/1
TABLET, FILM COATED ORAL
COMMUNITY
Start: 2021-04-21 | End: 2021-05-21 | Stop reason: SDUPTHER

## 2021-05-21 RX ORDER — BLOOD SUGAR DIAGNOSTIC
STRIP MISCELLANEOUS
Qty: 100 STRIP | Refills: 1 | Status: SHIPPED | OUTPATIENT
Start: 2021-05-21

## 2021-05-21 RX ORDER — SITAGLIPTIN 100 MG/1
TABLET, FILM COATED ORAL
Qty: 90 TABLET | Refills: 1 | Status: SHIPPED | OUTPATIENT
Start: 2021-05-21 | End: 2021-06-09

## 2021-05-21 RX ORDER — METFORMIN HYDROCHLORIDE 500 MG/1
TABLET ORAL
Qty: 360 TABLET | Refills: 1 | Status: SHIPPED | OUTPATIENT
Start: 2021-05-21 | End: 2021-09-27

## 2021-05-21 RX ORDER — CANAGLIFLOZIN 300 MG/1
300 TABLET, FILM COATED ORAL DAILY
Qty: 90 TABLET | Refills: 1 | Status: SHIPPED | OUTPATIENT
Start: 2021-05-21 | End: 2021-08-04

## 2021-05-21 RX ORDER — BLOOD-GLUCOSE METER
EACH MISCELLANEOUS
Qty: 1 EACH | Refills: 0 | Status: SHIPPED | OUTPATIENT
Start: 2021-05-21

## 2021-05-21 RX ORDER — PEN NEEDLE, DIABETIC 30 GX3/16"
NEEDLE, DISPOSABLE MISCELLANEOUS
Qty: 200 PEN NEEDLE | Refills: 2 | Status: SHIPPED | OUTPATIENT
Start: 2021-05-21

## 2021-05-21 NOTE — LETTER
5/21/2021 Patient: Gerardo Lorenz YOB: 1960 Date of Visit: 5/21/2021 Krystal Peres MD 
[address] [phone] [address] Fax: [phone] Dear Krystal Peres MD, Thank you for referring Ms. Ramya Langston to [address] [place] [address] for evaluation. My notes for this consultation are attached. If you have questions, please do not hesitate to call me. I look forward to following your patient along with you. Sincerely, Dawna Overton MD

## 2021-05-21 NOTE — PROGRESS NOTES
History and Physical    Patient: Jacy Russo MRN: [id number]  SSN: [id number]    YOB: 1960  Age: 56 y.o. Sex: female      Subjective:      Jacy Russo is a 56 y.o. female with past medical history of hypertension, hyperlipidemia, obesity status post lap band in 2008 (lost 100 pounds) is here for follow-up of type 2 diabetes mellitus. She remains in primary care of Dr. July Call. she has not had any more episodes of confusion since we decreased Levemir dose. Patient is here for 6-month follow-up. She has gained 8 pounds since last visit. She was not compliant with diabetic diet until recently. She has recently restarted exercising. Needs a new glucometer. Reporting compliance with medications. No urinary tract infections or vaginal yeast infections. Last diabetic eye exam was February 2021 and she was told everything looks okay.     Glucometer reading: Needs new glucometer    Updated diabetes history:  · Diagnosis: 5 years  · Current treatment: Metformin 500 mg 2 tabs bid, Invokana 300 mg daily, Januvia 100 mg daily, Levemir 10 units at night  · Past treatment: no  · Glucose checks: 1-2 times a day as above  · Hyperglycemia: rarely  · Hypoglycemia: yes  · Meals per day: 3, breakfast: egg/breakfast bar, lunch: squash and string beans, cabbage and chicken, dinner: chicken, shrimp, fish, snacks: bakes pretzels  · Exercise: sometimes walks when it is warm  · DM related hospitalizations: no  Complications of DM:  · CAD: no  · CVA: no  · PVD: no  · Amputations: no   · Retinopathy: no; last exam was 12/2018  · Gastropathy: no  · Nephropathy: no  · Neuropathy: no  Medications:  · Statin: crestor 5 mg  · ACE-I: no  · ASA: no  · Diabetes education: no      Past Medical History:   Diagnosis Date    Arrhythmia     \"IRREG HEARTBEAT\"  (DR MARTINEZ)    Arthritis     Chronic pain     BACK    Depression     [facility]     Dyspepsia and other specified disorders of function of stomach     GERD (gastroesophageal reflux disease)     Headache     Headache(784.0)     Hypertension     Hypertension 7/14/2020    Hyperthyroidism     Morbid obesity ([address]     Psychiatric disorder     DEPRESSION    Sleep apnea     no CPAP    Stool color black     Sun-damaged skin     Tanning bed exposure      Past Surgical History:   Procedure Laterality Date    HAND/FINGER SURGERY UNLISTED      x3    HX BACK SURGERY      x4- 2001,2003,2009,2011    HX CHOLECYSTECTOMY  2002    HX GASTRIC BYPASS  5/2008    LAP BAND ( G. Fabrice Anna)    HX HEENT  2006    LASIK BILAT    HX HYSTERECTOMY  2005    HX ORTHOPAEDIC  2006,2010    HANDS  RIGHT X2; LEFT X1    HX TONSILLECTOMY  1964    HX TUBAL LIGATION  [id number] REV    x2 and a reversal    HX UROLOGICAL  04/29/2015    urethra sling    IR INJ SPINE FLUORO GUIDED  8/3/2020    IR INJ SPINE FLUORO GUIDED  10/5/2020    WA LAP, PLACE ADJUST GASTR BAND  5/2008      Family History   Problem Relation Age of Onset    Post-op Nausea/Vomiting Mother     Delayed Awakening Mother     Psychiatric Disorder Mother     Hypertension Mother     Stroke Mother     Headache Mother     Post-op Nausea/Vomiting Daughter     Delayed Awakening Daughter     Cancer Father     Hypertension Father     Cancer Maternal Grandmother     Psychiatric Disorder Maternal Grandmother     Dementia Paternal Grandmother     Dementia Paternal Grandfather     Parkinson's Disease Paternal Grandfather      Social History     Tobacco Use    Smoking status: Never Smoker    Smokeless tobacco: Never Used   Substance Use Topics    Alcohol use: No      Prior to Admission medications    Medication Sig Start Date End Date Taking? Authorizing Provider   traMADoL (ULTRAM) 50 mg tablet TAKE 1 TABLET BY MOUTH EVERY 4 HOURS AS NEEDED FOR PAIN 5/10/21  Yes Provider, Historical   Invokana 300 mg tablet Take 1 Tablet by mouth daily for 90 days.  5/21/21 8/19/21 Yes Ashok Johnson MD   metFORMIN (GLUCOPHAGE) 500 mg tablet TAKE 2 TABLETS TWICE DAILY WITH MEALS 5/21/21  Yes Arik White MD   Insulin Needles, Disposable, (Droplet Pen Needle) 31 gauge x 5/16\" ndle Use to take insulin once a day, 90 days 5/21/21  Yes Arik White MD   insulin detemir U-100 (LEVEMIR FLEXTOUCH) 100 unit/mL (3 mL) inpn Inject 10 units at bedtime, 90 days 5/21/21  Yes Arik White MD   Januvia 100 mg tablet TAKE 1 TABLET EVERY DAY 5/21/21  Yes Arik White MD   glucose blood VI test strips (Accu-Chek Guide test strips) strip Once a day 5/21/21  Yes Arik White MD   Blood-Glucose Meter (Accu-Chek Guide Glucose Meter) misc Once a day 5/21/21  Yes Arik White MD   lancets misc Once a day 5/21/21  Yes Arik White MD   gabapentin (NEURONTIN) 300 mg capsule TAKE 1 CAPSULE THREE TIMES DAILY 5/17/21  Yes Lesly Crowley NP   montelukast (SINGULAIR) 10 mg tablet TAKE 1 TABLET EVERY DAY 3/29/21  Yes Lesly Crowley NP   rosuvastatin (CRESTOR) 5 mg tablet TAKE 1 TABLET EVERY NIGHT 3/15/21  Yes Arik White MD   lidocaine (XYLOCAINE) 2 % jelly APPLY A THIN FILM TO AFFECTED AREA ONLY (LOW BACK AND NECK ONLY) ONE TIME DAILY AS NEEDED 2/18/21  Yes Lesly Crowley NP   alcohol swabs (BD Single Use Swabs Regular) padm Use 3 times a day, 90 days 12/24/20  Yes Arik White MD   LORazepam (ATIVAN) 0.5 mg tablet Take 1 Tab by mouth two (2) times daily as needed for Anxiety. Max Daily Amount: 1 mg. 12/2/20  Yes Lesly Crowley NP   omeprazole (PRILOSEC) 20 mg capsule Take 20 mg by mouth daily.    Yes Provider, Historical   glucose blood VI test strips (Accu-Chek Larissa Plus test strp) strip Use to check glucose twice a day, 90 days 11/24/20  Yes Arik White MD   meloxicam (MOBIC) 15 mg tablet TAKE 1 TABLET EVERY DAY 7/30/20  Yes Lesly Crowley NP   hydroCHLOROthiazide (HYDRODIURIL) 12.5 mg tablet TAKE 1 TABLET EVERY DAY 7/30/20  Yes Malinda CUELLAR NP   mometasone (ELOCON) 0.1 % ointment APPLY  TO AFFECTED AREA TWO TIMES DAILY AS NEEDED FOR SKIN IRRITATION. 7/30/20  Yes Lesly Crowley, NP   Biotin 2,500 mcg cap Take  by mouth. Yes Provider, Historical   cetirizine (ZYRTEC) 10 mg tablet TK 1 T PO D PRN 6/18/18  Yes Provider, Historical   naloxone 2 mg/actuation spry Use 1 spray intranasally into 1 nostril. Use a new Narcan nasal spray for subsequent doses and administer into alternating nostrils. May repeat every 2 to 3 minutes as needed. 3/8/18  Yes Lisbeth Green, NAYELI   cholecalciferol, vitamin D3, 2,000 unit tab Take  by mouth. Yes Provider, Historical        No Known Allergies    Review of Systems:  ROS    A comprehensive review of systems was preformed and it is negative except mentioned in HPI    Objective:     Vitals:    05/21/21 1203   BP: 125/89   Pulse: 73   Temp: 98 °F (36.7 °C)   TempSrc: Temporal   SpO2: 100%   Weight: 167 lb 11.2 oz (76.1 kg)   Height: 5' 4\" (1.626 m)        Physical Exam:    Physical Exam  Vitals and nursing note reviewed. Constitutional:       Appearance: Normal appearance. HENT:      Head: Normocephalic and atraumatic. Eyes:      Extraocular Movements: Extraocular movements intact. Cardiovascular:      Rate and Rhythm: Normal rate and regular rhythm. Pulmonary:      Effort: Pulmonary effort is normal.      Breath sounds: Normal breath sounds. Skin:     General: Skin is warm and dry. Neurological:      Mental Status: She is alert. Psychiatric:         Mood and Affect: Mood normal.         Behavior: Behavior normal.         Thought Content: Thought content normal.         Judgment: Judgment normal.     11-:   diabetic foot exam:  Bilateral diabetic foot exam was performed today. Dorsalis pedis pulses 2+ bilaterally. Monofilament sensation normal bilaterally. No ulcers or skin breakdown. Labs and Imaging:  Results for Sundar Vee (MRN [id number]) as of 5/21/2021 12:05   Ref.  Range 2/24/2021 10:04   Sodium Latest Ref Range: 134 - 144 mmol/L 143   Potassium Latest Ref Range: 3.5 - 5.2 mmol/L 4.7   Chloride Latest Ref Range: 96 - 106 mmol/L 100   CO2 Latest Ref Range: 20 - 29 mmol/L 28   Glucose Latest Ref Range: 65 - 99 mg/dL 101 (H)   BUN Latest Ref Range: 8 - 27 mg/dL 14   Creatinine Latest Ref Range: 0.57 - 1.00 mg/dL 1.00   BUN/Creatinine ratio Latest Ref Range: 12 - 28  14   Calcium Latest Ref Range: 8.7 - 10.3 mg/dL 10.1   GFR est non-AA Latest Ref Range: >59 mL/min/1.73 61   GFR est AA Latest Ref Range: >59 mL/min/1.73 70   Bilirubin, total Latest Ref Range: 0.0 - 1.2 mg/dL 1.5 (H)   Protein, total Latest Ref Range: 6.0 - 8.5 g/dL 7.3   Albumin Latest Ref Range: 3.8 - 4.8 g/dL 4.7   A-G Ratio Latest Ref Range: 1.2 - 2.2  1.8   ALT Latest Ref Range: 0 - 32 IU/L 13   AST Latest Ref Range: 0 - 40 IU/L 15   Alk. phosphatase Latest Ref Range: 39 - 117 IU/L 87   Results for XIOMARA MALCOLM (MRN [id number]) as of 5/21/2021 12:05   Ref. Range 2/24/2021 10:04   Triglyceride Latest Ref Range: 0 - 149 mg/dL 175 (H)   Cholesterol, total Latest Ref Range: 100 - 199 mg/dL 151   HDL Cholesterol Latest Ref Range: >39 mg/dL 62   VLDL, calculated Latest Ref Range: 5 - 40 mg/dL 29   LDL, calculated Latest Ref Range: 0 - 99 mg/dL 60   Results for XIOMARA MALCOLM (MRN [id number]) as of 5/21/2021 12:05   Ref. Range 2/24/2021 10:04   Creatinine, urine Latest Ref Range: Not Estab. mg/dL 117.2   Microalbumin, urine Latest Ref Range: Not Estab. ug/mL 7.4   Microalbumin/Creat.  Ratio Latest Ref Range: 0 - 29 mg/g creat 6   TSH Latest Ref Range: 0.450 - 4.500 uIU/mL 2.320     Last 3 Recorded Weights in this Encounter    05/21/21 1203   Weight: 167 lb 11.2 oz (76.1 kg)        Lab Results   Component Value Date/Time    Hemoglobin A1c 6.1 (H) 09/16/2019 02:28 PM    Hemoglobin A1c 7.3 (H) 12/14/2018 11:12 AM    Hemoglobin A1c 7.0 (H) 09/17/2018 10:45 AM    Hemoglobin A1c, External 6.3 01/24/2020 12:00 AM        Assessment:     Patient Active Problem List   Diagnosis Code    Lumbar stenosis M48.061    GERD (gastroesophageal reflux disease) K21.9    Dysphagia R13.10    Status post gastric banding Z98.84    DDD (degenerative disc disease), lumbar M51.36    Depression F32.9    Diverticulosis K57.90    NICOLE (obstructive sleep apnea) G47.33    Diabetes mellitus (HCC) E11.9    Urinary frequency R35.0    Advanced care planning/counseling discussion Z71.89    Type 2 diabetes mellitus with diabetic neuropathy ([facility] E11.40           Plan:   type 2 diabetes mellitus uncontrolled   Hemoglobin A1c was 7.3% on 12-, 6.8% on 4-8-2019, 6.8% in 7-, 6.3% on 1-, 7.9% on 7-, 6.4% on 11-, 7.4% today. Fingerstick blood glucose is 177 mg/dL in my office today. Up to date with diabetes related annual labs: yes 2-  Up to date with diabetic eye exam: yes February 2021    plan:  Glucose control has worsened again because of dietary noncompliance. Discussed with patient about importance of following diabetic diet. Encourage patient to continue to exercise. Continue current medications. Sending prescription for new glucometer, start checking blood glucose once a day. I will see her back in 3 months. essential hypertension   blood pressure well-controlled on current medications. No microalbuminuria, last checked in February 2021. mixed hyperlipidemia   LDL was adequate at 36 in 1/2020, 60 in February 2021. Continue Crestor 5 mg daily. intermittent confusion   resolved after decreasing insulin. Orders Placed This Encounter    AMB POC HEMOGLOBIN A1C    AMB POC GLUCOSE BLOOD, BY GLUCOSE MONITORING DEVICE    Invokana 300 mg tablet     Sig: Take 1 Tablet by mouth daily for 90 days.      Dispense:  90 Tablet     Refill:  1    metFORMIN (GLUCOPHAGE) 500 mg tablet     Sig: TAKE 2 TABLETS TWICE DAILY WITH MEALS     Dispense:  360 Tablet     Refill:  1    Insulin Needles, Disposable, (Droplet Pen Needle) 31 gauge x 5/16\" ndle     Sig: Use to take insulin once a day, 90 days     Dispense:  200 Pen Needle     Refill:  2    insulin detemir U-100 (LEVEMIR FLEXTOUCH) 100 unit/mL (3 mL) inpn     Sig: Inject 10 units at bedtime, 90 days     Dispense:  3 Adjustable Dose Pre-filled Pen Syringe     Refill:  1    Januvia 100 mg tablet     Sig: TAKE 1 TABLET EVERY DAY     Dispense:  90 Tablet     Refill:  1    glucose blood VI test strips (Accu-Chek Guide test strips) strip     Sig: Once a day     Dispense:  100 Strip     Refill:  1    Blood-Glucose Meter (Accu-Chek Guide Glucose Meter) misc     Sig: Once a day     Dispense:  1 Each     Refill:  0    lancets misc     Sig: Once a day     Dispense:  100 Each     Refill:  1        Signed By: Jayna Michaels MD     May 21, 2021      Return to clinic 3 months

## 2021-05-24 DIAGNOSIS — Z79.4 TYPE 2 DIABETES MELLITUS WITH OTHER SPECIFIED COMPLICATION, WITH LONG-TERM CURRENT USE OF INSULIN (HCC): ICD-10-CM

## 2021-05-24 DIAGNOSIS — E11.69 TYPE 2 DIABETES MELLITUS WITH OTHER SPECIFIED COMPLICATION, WITH LONG-TERM CURRENT USE OF INSULIN (HCC): ICD-10-CM

## 2021-06-09 DIAGNOSIS — Z79.4 TYPE 2 DIABETES MELLITUS WITH OTHER SPECIFIED COMPLICATION, WITH LONG-TERM CURRENT USE OF INSULIN (HCC): ICD-10-CM

## 2021-06-09 DIAGNOSIS — E11.69 TYPE 2 DIABETES MELLITUS WITH OTHER SPECIFIED COMPLICATION, WITH LONG-TERM CURRENT USE OF INSULIN (HCC): ICD-10-CM

## 2021-06-09 RX ORDER — MONTELUKAST SODIUM 10 MG/1
TABLET ORAL
Qty: 90 TABLET | Refills: 0 | Status: SHIPPED | OUTPATIENT
Start: 2021-06-09 | End: 2021-08-04

## 2021-06-09 RX ORDER — SITAGLIPTIN 100 MG/1
TABLET, FILM COATED ORAL
Qty: 90 TABLET | Refills: 1 | Status: SHIPPED | OUTPATIENT
Start: 2021-06-09 | End: 2021-11-26 | Stop reason: ALTCHOICE

## 2021-06-09 RX ORDER — HYDROCHLOROTHIAZIDE 12.5 MG/1
TABLET ORAL
Qty: 90 TABLET | Refills: 3 | Status: SHIPPED | OUTPATIENT
Start: 2021-06-09

## 2021-06-09 RX ORDER — MELOXICAM 15 MG/1
TABLET ORAL
Qty: 90 TABLET | Refills: 3 | Status: SHIPPED | OUTPATIENT
Start: 2021-06-09 | End: 2022-07-12

## 2021-06-14 DIAGNOSIS — M51.36 DDD (DEGENERATIVE DISC DISEASE), LUMBAR: ICD-10-CM

## 2021-06-14 RX ORDER — GABAPENTIN 300 MG/1
CAPSULE ORAL
Qty: 270 CAPSULE | Refills: 0 | Status: SHIPPED | OUTPATIENT
Start: 2021-06-14

## 2021-08-04 DIAGNOSIS — Z79.4 TYPE 2 DIABETES MELLITUS WITH OTHER SPECIFIED COMPLICATION, WITH LONG-TERM CURRENT USE OF INSULIN (HCC): ICD-10-CM

## 2021-08-04 DIAGNOSIS — E11.69 TYPE 2 DIABETES MELLITUS WITH OTHER SPECIFIED COMPLICATION, WITH LONG-TERM CURRENT USE OF INSULIN (HCC): ICD-10-CM

## 2021-08-04 RX ORDER — MONTELUKAST SODIUM 10 MG/1
TABLET ORAL
Qty: 90 TABLET | Refills: 0 | Status: SHIPPED | OUTPATIENT
Start: 2021-08-04 | End: 2021-10-25

## 2021-08-04 RX ORDER — OMEPRAZOLE 20 MG/1
CAPSULE, DELAYED RELEASE ORAL
Qty: 90 CAPSULE | Refills: 1 | Status: SHIPPED | OUTPATIENT
Start: 2021-08-04

## 2021-08-04 RX ORDER — CANAGLIFLOZIN 300 MG/1
TABLET, FILM COATED ORAL
Qty: 90 TABLET | Refills: 1 | Status: SHIPPED | OUTPATIENT
Start: 2021-08-04 | End: 2021-11-26 | Stop reason: ALTCHOICE

## 2021-08-04 RX ORDER — ROSUVASTATIN CALCIUM 5 MG/1
TABLET, COATED ORAL
Qty: 90 TABLET | Refills: 1 | Status: SHIPPED | OUTPATIENT
Start: 2021-08-04 | End: 2021-12-22

## 2021-08-18 ENCOUNTER — OFFICE VISIT (OUTPATIENT)
Dept: ENDOCRINOLOGY | Age: 61
End: 2021-08-18
Payer: MEDICARE

## 2021-08-18 VITALS
SYSTOLIC BLOOD PRESSURE: 126 MMHG | TEMPERATURE: 96.6 F | WEIGHT: 170.4 LBS | BODY MASS INDEX: 29.09 KG/M2 | HEART RATE: 96 BPM | OXYGEN SATURATION: 92 % | DIASTOLIC BLOOD PRESSURE: 84 MMHG | HEIGHT: 64 IN

## 2021-08-18 DIAGNOSIS — E11.69 TYPE 2 DIABETES MELLITUS WITH OTHER SPECIFIED COMPLICATION, WITH LONG-TERM CURRENT USE OF INSULIN (HCC): Primary | ICD-10-CM

## 2021-08-18 DIAGNOSIS — Z79.4 TYPE 2 DIABETES MELLITUS WITH OTHER SPECIFIED COMPLICATION, WITH LONG-TERM CURRENT USE OF INSULIN (HCC): Primary | ICD-10-CM

## 2021-08-18 LAB
GLUCOSE POC: 117 MG/DL
HBA1C MFR BLD HPLC: 7.4 %

## 2021-08-18 PROCEDURE — 3051F HG A1C>EQUAL 7.0%<8.0%: CPT | Performed by: INTERNAL MEDICINE

## 2021-08-18 PROCEDURE — G9717 DOC PT DX DEP/BP F/U NT REQ: HCPCS | Performed by: INTERNAL MEDICINE

## 2021-08-18 PROCEDURE — 82962 GLUCOSE BLOOD TEST: CPT | Performed by: INTERNAL MEDICINE

## 2021-08-18 PROCEDURE — G8417 CALC BMI ABV UP PARAM F/U: HCPCS | Performed by: INTERNAL MEDICINE

## 2021-08-18 PROCEDURE — 83036 HEMOGLOBIN GLYCOSYLATED A1C: CPT | Performed by: INTERNAL MEDICINE

## 2021-08-18 PROCEDURE — G8427 DOCREV CUR MEDS BY ELIG CLIN: HCPCS | Performed by: INTERNAL MEDICINE

## 2021-08-18 PROCEDURE — 3017F COLORECTAL CA SCREEN DOC REV: CPT | Performed by: INTERNAL MEDICINE

## 2021-08-18 PROCEDURE — G9899 SCRN MAM PERF RSLTS DOC: HCPCS | Performed by: INTERNAL MEDICINE

## 2021-08-18 PROCEDURE — 2022F DILAT RTA XM EVC RTNOPTHY: CPT | Performed by: INTERNAL MEDICINE

## 2021-08-18 PROCEDURE — 99214 OFFICE O/P EST MOD 30 MIN: CPT | Performed by: INTERNAL MEDICINE

## 2021-08-18 NOTE — PROGRESS NOTES
History and Physical    Patient: Robles Cole MRN: [id number]  SSN: [id number]    YOB: 1960  Age: 59 y.o. Sex: female      Subjective:      Robles Cole is a 59 y.o. female with past medical history of hypertension, hyperlipidemia, obesity status post lap band in 2008 (lost 100 pounds) is here for follow-up of type 2 diabetes mellitus. She remains in primary care of Dr. Natasha Garcia. she has not had any more episodes of confusion since we decreased Levemir dose. Patient has gained 3 more pounds since last visit. She is reporting increased hunger because of which she is not able to be compliant with diabetic diet. She has been waking up in the middle of the night with sweating and when she checks her blood glucose is in the 60s. She is taking Levemir 10 units at bedtime, Januvia 100 mg daily, Invokana 300 mg daily, Metformin 500 mg 2 tablets twice a day. She is doing water aerobics regularly. She was recently diagnosed with sleep apnea and she has started using CPAP. No urinary tract infections or vaginal yeast infections. Glucometer reading: Checking blood glucose once a day. Readings are ranging from 61 to 173 mg/dL.     Updated diabetes history:  · Diagnosis: 5 years  · Current treatment: Metformin 500 mg 2 tabs bid, Invokana 300 mg daily, Januvia 100 mg daily, Levemir 10 units at night  · Past treatment: no  · Glucose checks: 1-2 times a day as above  · Hyperglycemia: rarely  · Hypoglycemia: yes  · Meals per day: 3, breakfast: egg/breakfast bar, lunch: squash and string beans, cabbage and chicken, dinner: chicken, shrimp, fish, snacks: bakes pretzels  · Exercise: sometimes walks when it is warm  · DM related hospitalizations: no  Complications of DM:  · CAD: no  · CVA: no  · PVD: no  · Amputations: no   · Retinopathy: no; last exam was 1-2021  · Gastropathy: no  · Nephropathy: no  · Neuropathy: no  Medications:  · Statin: crestor 5 mg  · ACE-I: no  · ASA: no  · Diabetes education: no      Past Medical History:   Diagnosis Date    Arrhythmia     \"IRREG HEARTBEAT\"  (DR MARTINEZ)    Arthritis     Chronic pain     BACK    Depression     Diabetes [facility]     Dyspepsia and other specified disorders of function of stomach     GERD (gastroesophageal reflux disease)     Headache     Headache(784.0)     Hypertension     Hypertension 7/14/2020    Hyperthyroidism     Morbid obesity ([facility] [facility]     Psychiatric disorder     DEPRESSION    Sleep apnea     no CPAP    Stool color black     Sun-damaged skin     Tanning bed exposure      Past Surgical History:   Procedure Laterality Date    HAND/FINGER SURGERY UNLISTED      x3    HX BACK SURGERY      x4- 2001,2003,2009,2011    HX CHOLECYSTECTOMY  2002    HX GASTRIC BYPASS  5/2008    LAP BAND ( RESHMA Ramirez)    HX HEENT  2006    LASIK BILAT    HX HYSTERECTOMY  2005    HX ORTHOPAEDIC  2006,2010    HANDS  RIGHT X2; LEFT X1    HX TONSILLECTOMY  1964    HX TUBAL LIGATION  [id number] REV    x2 and a reversal    HX UROLOGICAL  04/29/2015    urethra sling    IR INJ SPINE FLUORO GUIDED  8/3/2020    IR INJ SPINE FLUORO GUIDED  10/5/2020    MI LAP, PLACE ADJUST GASTR BAND  5/2008      Family History   Problem Relation Age of Onset    Post-op Nausea/Vomiting Mother     Delayed Awakening Mother     Psychiatric Disorder Mother     Hypertension Mother     Stroke Mother     Headache Mother     Post-op Nausea/Vomiting Daughter     Delayed Awakening Daughter     Cancer Father     Hypertension Father     Cancer Maternal Grandmother     Psychiatric Disorder Maternal Grandmother     Dementia Paternal Grandmother     Dementia Paternal Grandfather     Parkinson's Disease Paternal Grandfather      Social History     Tobacco Use    Smoking status: Never Smoker    Smokeless tobacco: Never Used   Substance Use Topics    Alcohol use: No      Prior to Admission medications    Medication Sig Start Date End Date Taking?  Authorizing Provider   Invokana 300 mg tablet TAKE 1 TABLET EVERY DAY BEFORE BREAKFAST 8/4/21  Yes Von Bravo MD   omeprazole (PRILOSEC) 20 mg capsule TAKE 1 CAPSULE EVERY DAY 8/4/21  Yes Jennifer Combs NP   rosuvastatin (CRESTOR) 5 mg tablet TAKE 1 TABLET EVERY NIGHT 8/4/21  Yes Von Bravo MD   montelukast (SINGULAIR) 10 mg tablet TAKE 1 TABLET EVERY DAY 8/4/21  Yes Jennifer Combs NP   glucose blood VI test strips (Accu-Chek Larissa Plus test strp) strip TEST BLOOD SUGAR TWICE DAILY AS DIRECTED 8/2/21  Yes Von Bravo MD   gabapentin (NEURONTIN) 300 mg capsule TAKE 1 CAPSULE THREE TIMES DAILY 6/14/21  Yes Jennifer Combs NP   Januvia 100 mg tablet TAKE 1 TABLET EVERY DAY 6/9/21  Yes Von Bravo MD   meloxicam (MOBIC) 15 mg tablet TAKE 1 TABLET EVERY DAY 6/9/21  Yes Jennifer Combs NP   hydroCHLOROthiazide (HYDRODIURIL) 12.5 mg tablet TAKE 1 TABLET EVERY DAY 6/9/21  Yes Jennifer Combs NP   traMADoL (ULTRAM) 50 mg tablet TAKE 1 TABLET BY MOUTH EVERY 4 HOURS AS NEEDED FOR PAIN 5/10/21  Yes Provider, Historical   metFORMIN (GLUCOPHAGE) 500 mg tablet TAKE 2 TABLETS TWICE DAILY WITH MEALS 5/21/21  Yes Von Bravo MD   Insulin Needles, Disposable, (Droplet Pen Needle) 31 gauge x 5/16\" ndle Use to take insulin once a day, 90 days 5/21/21  Yes Von Bravo MD   insulin detemir U-100 (LEVEMIR FLEXTOUCH) 100 unit/mL (3 mL) inpn Inject 10 units at bedtime, 90 days 5/21/21  Yes Von Bravo MD   glucose blood VI test strips (Accu-Chek Guide test strips) strip Once a day 5/21/21  Yes Von Bravo MD   Blood-Glucose Meter (Accu-Chek Guide Glucose Meter) misc Once a day 5/21/21  Yes Von Bravo MD   lancets misc Once a day 5/21/21  Yes Von Bravo MD   lidocaine (XYLOCAINE) 2 % jelly APPLY A THIN FILM TO AFFECTED AREA ONLY (LOW BACK AND NECK ONLY) ONE TIME DAILY AS NEEDED 2/18/21  Yes Jennifer Combs, NAYELI   alcohol swabs (BD Single Use Swabs Regular) padm Use 3 times a day, 90 days 12/24/20  Yes Tye Jacinto MD   LORazepam (ATIVAN) 0.5 mg tablet Take 1 Tab by mouth two (2) times daily as needed for Anxiety. Max Daily Amount: 1 mg. 12/2/20  Yes Zoë Browne, NP   mometasone (ELOCON) 0.1 % ointment APPLY  TO AFFECTED AREA TWO TIMES DAILY AS NEEDED FOR SKIN IRRITATION. 7/30/20  Yes Craig Sneed NP   Biotin 2,500 mcg cap Take  by mouth. Yes Provider, Historical   cetirizine (ZYRTEC) 10 mg tablet TK 1 T PO D PRN 6/18/18  Yes Provider, Historical   naloxone 2 mg/actuation spry Use 1 spray intranasally into 1 nostril. Use a new Narcan nasal spray for subsequent doses and administer into alternating nostrils. May repeat every 2 to 3 minutes as needed. 3/8/18  Yes Rebeka White NP   cholecalciferol, vitamin D3, 2,000 unit tab Take  by mouth. Yes Provider, Historical        No Known Allergies    Review of Systems:  ROS    A comprehensive review of systems was preformed and it is negative except mentioned in HPI    Objective:     Vitals:    08/18/21 1056   BP: 126/84   Pulse: 96   Temp: (!) 96.6 °F (35.9 °C)   TempSrc: Temporal   SpO2: 92%   Weight: 170 lb 6.4 oz (77.3 kg)   Height: 5' 4\" (1.626 m)        Physical Exam:    Physical Exam  Vitals and nursing note reviewed. Constitutional:       Appearance: Normal appearance. HENT:      Head: Normocephalic and atraumatic. Eyes:      Extraocular Movements: Extraocular movements intact. Cardiovascular:      Rate and Rhythm: Normal rate and regular rhythm. Pulmonary:      Effort: Pulmonary effort is normal.      Breath sounds: Normal breath sounds. Skin:     General: Skin is warm and dry. Neurological:      Mental Status: She is alert. Psychiatric:         Mood and Affect: Mood normal.         Behavior: Behavior normal.         Thought Content: Thought content normal.         Judgment: Judgment normal.     11-:   diabetic foot exam:  Bilateral diabetic foot exam was performed today.   Dorsalis pedis pulses 2+ bilaterally. Monofilament sensation normal bilaterally. No ulcers or skin breakdown. Labs and Imaging:  Results for Erica Hernandez (MRN [id number]) as of 5/21/2021 12:05   Ref. Range 2/24/2021 10:04   Sodium Latest Ref Range: 134 - 144 mmol/L 143   Potassium Latest Ref Range: 3.5 - 5.2 mmol/L 4.7   Chloride Latest Ref Range: 96 - 106 mmol/L 100   CO2 Latest Ref Range: 20 - 29 mmol/L 28   Glucose Latest Ref Range: 65 - 99 mg/dL 101 (H)   BUN Latest Ref Range: 8 - 27 mg/dL 14   Creatinine Latest Ref Range: 0.57 - 1.00 mg/dL 1.00   BUN/Creatinine ratio Latest Ref Range: 12 - 28  14   Calcium Latest Ref Range: 8.7 - 10.3 mg/dL 10.1   GFR est non-AA Latest Ref Range: >59 mL/min/1.73 61   GFR est AA Latest Ref Range: >59 mL/min/1.73 70   Bilirubin, total Latest Ref Range: 0.0 - 1.2 mg/dL 1.5 (H)   Protein, total Latest Ref Range: 6.0 - 8.5 g/dL 7.3   Albumin Latest Ref Range: 3.8 - 4.8 g/dL 4.7   A-G Ratio Latest Ref Range: 1.2 - 2.2  1.8   ALT Latest Ref Range: 0 - 32 IU/L 13   AST Latest Ref Range: 0 - 40 IU/L 15   Alk. phosphatase Latest Ref Range: 39 - 117 IU/L 87   Results for XIOMARA MALCOLM (MRN [id number]) as of 5/21/2021 12:05   Ref. Range 2/24/2021 10:04   Triglyceride Latest Ref Range: 0 - 149 mg/dL 175 (H)   Cholesterol, total Latest Ref Range: 100 - 199 mg/dL 151   HDL Cholesterol Latest Ref Range: >39 mg/dL 62   VLDL, calculated Latest Ref Range: 5 - 40 mg/dL 29   LDL, calculated Latest Ref Range: 0 - 99 mg/dL 60   Results for XIOMARA MALCOLM (MRN [id number]) as of 5/21/2021 12:05   Ref. Range 2/24/2021 10:04   Creatinine, urine Latest Ref Range: Not Estab. mg/dL 117.2   Microalbumin, urine Latest Ref Range: Not Estab. ug/mL 7.4   Microalbumin/Creat.  Ratio Latest Ref Range: 0 - 29 mg/g creat 6   TSH Latest Ref Range: 0.450 - 4.500 uIU/mL 2.320     Last 3 Recorded Weights in this Encounter    08/18/21 1056   Weight: 170 lb 6.4 oz (77.3 kg)        Lab Results   Component Value Date/Time Hemoglobin A1c 6.1 (H) 09/16/2019 02:28 PM    Hemoglobin A1c 7.3 (H) 12/14/2018 11:12 AM    Hemoglobin A1c 7.0 (H) 09/17/2018 10:45 AM    Hemoglobin A1c, External 6.3 01/24/2020 12:00 AM        Assessment:     Patient Active Problem List   Diagnosis Code    Lumbar stenosis M48.061    GERD (gastroesophageal reflux disease) K21.9    Dysphagia R13.10    Status post gastric banding Z98.84    DDD (degenerative disc disease), lumbar M51.36    Depression F32.9    Diverticulosis K57.90    NICOLE (obstructive sleep apnea) G47.33    Diabetes mellitus ([facility] [address] E11.9    Urinary frequency R35.0    Advanced care planning/counseling discussion Z71.89    Type 2 diabetes mellitus with diabetic neuropathy ([facility] E11.40           Plan:     type 2 diabetes mellitus uncontrolled   Hemoglobin A1c was 7.3% on 12-, 6.8% on 4-8-2019, 6.8% in 7-, 6.3% on 1-, 7.9% on 7-, 6.4% on 11-, 7.4% on 5-, 7.4% today. Fingerstick blood glucose is 177 mg/dL in my office today. Up to date with diabetes related annual labs: yes 2-  Up to date with diabetic eye exam: yes February 2021    plan:  Patient is having nocturnal hypoglycemia with only 10 units of Levemir. I will stop Levemir altogether. Maybe this will help with decreasing hunger. Continue rest of the medications. Advised patient to check blood glucose if she is still waking up in the middle of the night with sweating. At next visit I will consider switching her from Januvia to GLP-1 agonist.  I will see her back in 6 weeks. essential hypertension   blood pressure well-controlled on current medications. No microalbuminuria, last checked in February 2021. mixed hyperlipidemia   LDL was adequate at 36 in 1/2020, 60 in February 2021. Continue Crestor 5 mg daily. intermittent confusion   resolved after decreasing insulin.     Orders Placed This Encounter    AMB POC GLUCOSE BLOOD, BY GLUCOSE MONITORING DEVICE    AMB POC HEMOGLOBIN A1C        Signed By: Mira Pretty MD     August 18, 2021      Return to clinic 6 weeks

## 2021-08-18 NOTE — LETTER
8/18/2021    Patient: Sis Larsonster   YOB: 1960   Date of Visit: 8/18/2021     Vicky Deutsch MD  [address] [place] [address]  Via Fax: [phone]    Dear Vicky Deutsch MD,      Thank you for referring Ms. Jadyn Phillips to [address] [place] [address] ENDOCRINOLOGY for evaluation. My notes for this consultation are attached. If you have questions, please do not hesitate to call me. I look forward to following your patient along with you.       Sincerely,    Mira Pretty MD

## 2021-09-27 DIAGNOSIS — E11.69 TYPE 2 DIABETES MELLITUS WITH OTHER SPECIFIED COMPLICATION, WITH LONG-TERM CURRENT USE OF INSULIN (HCC): ICD-10-CM

## 2021-09-27 DIAGNOSIS — Z79.4 TYPE 2 DIABETES MELLITUS WITH OTHER SPECIFIED COMPLICATION, WITH LONG-TERM CURRENT USE OF INSULIN (HCC): ICD-10-CM

## 2021-09-27 RX ORDER — ISOPROPYL ALCOHOL 70 ML/100ML
SWAB TOPICAL
Qty: 300 PAD | Refills: 1 | Status: SHIPPED | OUTPATIENT
Start: 2021-09-27 | End: 2022-07-22

## 2021-09-27 RX ORDER — METFORMIN HYDROCHLORIDE 500 MG/1
TABLET ORAL
Qty: 360 TABLET | Refills: 1 | Status: SHIPPED | OUTPATIENT
Start: 2021-09-27 | End: 2021-11-26 | Stop reason: SDUPTHER

## 2021-09-27 RX ORDER — LANCETS
EACH MISCELLANEOUS
Qty: 100 EACH | Refills: 1 | Status: SHIPPED | OUTPATIENT
Start: 2021-09-27

## 2021-10-06 ENCOUNTER — OFFICE VISIT (OUTPATIENT)
Dept: ENDOCRINOLOGY | Age: 61
End: 2021-10-06
Payer: MEDICARE

## 2021-10-06 VITALS
HEIGHT: 64 IN | SYSTOLIC BLOOD PRESSURE: 113 MMHG | TEMPERATURE: 97.5 F | OXYGEN SATURATION: 100 % | BODY MASS INDEX: 26.94 KG/M2 | HEART RATE: 108 BPM | WEIGHT: 157.8 LBS | DIASTOLIC BLOOD PRESSURE: 76 MMHG

## 2021-10-06 DIAGNOSIS — Z79.4 TYPE 2 DIABETES MELLITUS WITH OTHER SPECIFIED COMPLICATION, WITH LONG-TERM CURRENT USE OF INSULIN (HCC): Primary | ICD-10-CM

## 2021-10-06 DIAGNOSIS — E11.69 TYPE 2 DIABETES MELLITUS WITH OTHER SPECIFIED COMPLICATION, WITH LONG-TERM CURRENT USE OF INSULIN (HCC): Primary | ICD-10-CM

## 2021-10-06 LAB
BILIRUB UR QL STRIP: NEGATIVE
GLUCOSE POC: 322 MG/DL
GLUCOSE UR-MCNC: NORMAL MG/DL
KETONES P FAST UR STRIP-MCNC: NORMAL MG/DL
PH UR STRIP: 5.5 [PH] (ref 4.6–8)
PROT UR QL STRIP: NEGATIVE
SP GR UR STRIP: 1.01 (ref 1–1.03)
UA UROBILINOGEN AMB POC: NORMAL (ref 0.2–1)
URINALYSIS CLARITY POC: CLEAR
URINALYSIS COLOR POC: YELLOW
URINE BLOOD POC: NEGATIVE
URINE LEUKOCYTES POC: NEGATIVE
URINE NITRITES POC: POSITIVE

## 2021-10-06 PROCEDURE — 81003 URINALYSIS AUTO W/O SCOPE: CPT | Performed by: INTERNAL MEDICINE

## 2021-10-06 PROCEDURE — 82962 GLUCOSE BLOOD TEST: CPT | Performed by: INTERNAL MEDICINE

## 2021-10-06 PROCEDURE — G8427 DOCREV CUR MEDS BY ELIG CLIN: HCPCS | Performed by: INTERNAL MEDICINE

## 2021-10-06 PROCEDURE — 2022F DILAT RTA XM EVC RTNOPTHY: CPT | Performed by: INTERNAL MEDICINE

## 2021-10-06 PROCEDURE — G9717 DOC PT DX DEP/BP F/U NT REQ: HCPCS | Performed by: INTERNAL MEDICINE

## 2021-10-06 PROCEDURE — G8419 CALC BMI OUT NRM PARAM NOF/U: HCPCS | Performed by: INTERNAL MEDICINE

## 2021-10-06 PROCEDURE — 3051F HG A1C>EQUAL 7.0%<8.0%: CPT | Performed by: INTERNAL MEDICINE

## 2021-10-06 PROCEDURE — 3017F COLORECTAL CA SCREEN DOC REV: CPT | Performed by: INTERNAL MEDICINE

## 2021-10-06 PROCEDURE — G9899 SCRN MAM PERF RSLTS DOC: HCPCS | Performed by: INTERNAL MEDICINE

## 2021-10-06 PROCEDURE — 99214 OFFICE O/P EST MOD 30 MIN: CPT | Performed by: INTERNAL MEDICINE

## 2021-10-06 NOTE — PROGRESS NOTES
History and Physical    Patient: Mey Montaño MRN: [id number]  SSN: [id number]    YOB: 1960  Age: 59 y.o. Sex: female      Subjective:      Mey Montaño is a 59 y.o. female with past medical history of hypertension, hyperlipidemia, obesity status post lap band in 2008 (lost 100 pounds) is here for follow-up of type 2 diabetes mellitus. She remains in primary care of Dr. Divya Castillo. Last visit she was taking Levemir 10 units at bedtime and she was reporting a lot of hunger, low blood glucose in the middle of the night readings going in 60s. To be totally stop Levemir. She continues to be on Metformin 500 mg 2 tablets twice a day, Invokana 300 mg daily and Januvia 100 mg daily. She does not have that excessive hunger anymore, but now she has low appetite. She has lost 13 pounds since last visit 6 weeks back. Denies any stomach pain, nausea or vomiting. She was doing water aerobics, that program has finished. She wants to start walking for exercise but she has been very busy at home, taking care of her fiancé who is sick. She was recently diagnosed with sleep apnea and she has started using CPAP. No urinary tract infections or vaginal yeast infections. Glucometer reading: Not checking blood glucose much. Readings are ranging from 198 to 379 mg/dL.     Updated diabetes history:  · Diagnosis: 5 years  · Current treatment: Metformin 500 mg 2 tabs bid, Invokana 300 mg daily, Januvia 100 mg daily,   · Past treatment: Levemir (low blood glucose)  · Glucose checks: 1-2 times a day as above  · Hyperglycemia: rarely  · Hypoglycemia: yes  · Meals per day: 3, breakfast: egg/breakfast bar, lunch: squash and string beans, cabbage and chicken, dinner: chicken, shrimp, fish, snacks: bakes pretzels  · Exercise: sometimes walks when it is warm  · DM related hospitalizations: no  Complications of DM:  · CAD: no  · CVA: no  · PVD: no  · Amputations: no   · Retinopathy: no; last exam was 1-2021  · Gastropathy: no  · Nephropathy: no  · Neuropathy: no  Medications:  · Statin: crestor 5 mg  · ACE-I: no  · ASA: no  · Diabetes education: no      Past Medical History:   Diagnosis Date    Arrhythmia     \"IRREG HEARTBEAT\"  (DR MARTINEZ)    Arthritis     Chronic pain     BACK    Depression     [facility] [address]     Dyspepsia and other specified disorders of function of stomach     GERD (gastroesophageal reflux disease)     Headache     Headache(784.0)     Hypertension     Hypertension 7/14/2020    Hyperthyroidism     Morbid obesity ([facility] [address]     Psychiatric disorder     DEPRESSION    Sleep apnea     no CPAP    Stool color black     Sun-damaged skin     Tanning bed exposure      Past Surgical History:   Procedure Laterality Date    HAND/FINGER SURGERY UNLISTED      x3    HX BACK SURGERY      x4- 2001,2003,2009,2011    HX CHOLECYSTECTOMY  2002    HX GASTRIC BYPASS  5/2008    LAP BAND ( RESHMA RODRIGUEZ)    HX HEENT  2006    LASIK BILAT    HX HYSTERECTOMY  2005    HX ORTHOPAEDIC  2006,2010    HANDS  RIGHT X2; LEFT X1    HX TONSILLECTOMY  1964    HX TUBAL LIGATION  [id number] REV    x2 and a reversal    HX UROLOGICAL  04/29/2015    urethra sling    IR INJ SPINE FLUORO GUIDED  8/3/2020    IR INJ SPINE FLUORO GUIDED  10/5/2020    GA LAP, PLACE ADJUST GASTR BAND  5/2008      Family History   Problem Relation Age of Onset    Post-op Nausea/Vomiting Mother     Delayed Awakening Mother     Psychiatric Disorder Mother     Hypertension Mother     Stroke Mother     Headache Mother     Post-op Nausea/Vomiting Daughter     Delayed Awakening Daughter     Cancer Father     Hypertension Father     Cancer Maternal Grandmother     Psychiatric Disorder Maternal Grandmother     Dementia Paternal Grandmother     Dementia Paternal Grandfather     Parkinson's Disease Paternal Grandfather      Social History     Tobacco Use    Smoking status: Never Smoker    Smokeless tobacco: Never Used Substance Use Topics    Alcohol use: No      Prior to Admission medications    Medication Sig Start Date End Date Taking?  Authorizing Provider   insulin NPH (HUMULIN N) 100 unit/mL (3 mL) inpn Inject 10 units in am 10/6/21  Yes Carl Steiner MD   alcohol swabs (BD Single Use Swabs Regular) padm USE THREE TIMES DAILY 9/27/21  Yes Carl Steiner MD   lancets (Accu-Chek Softclix Lancets) misc TEST BLOOD SUGAR EVERY DAY 9/27/21  Yes Carl Steiner MD   metFORMIN (GLUCOPHAGE) 500 mg tablet TAKE 2 TABLETS TWICE DAILY WITH MEALS 9/27/21  Yes Carl Steiner MD   Invokana 300 mg tablet TAKE 1 TABLET EVERY DAY BEFORE BREAKFAST 8/4/21  Yes Carl Steiner MD   omeprazole (PRILOSEC) 20 mg capsule TAKE 1 CAPSULE EVERY DAY 8/4/21  Yes Jovanny Franco NP   rosuvastatin (CRESTOR) 5 mg tablet TAKE 1 TABLET EVERY NIGHT 8/4/21  Yes Carl Steiner MD   montelukast (SINGULAIR) 10 mg tablet TAKE 1 TABLET EVERY DAY 8/4/21  Yes Jovanny Franco NP   glucose blood VI test strips (Accu-Chek Larissa Plus test strp) strip TEST BLOOD SUGAR TWICE DAILY AS DIRECTED 8/2/21  Yes Carl Steiner MD   gabapentin (NEURONTIN) 300 mg capsule TAKE 1 CAPSULE THREE TIMES DAILY 6/14/21  Yes Jovanny Franco NP   Januvia 100 mg tablet TAKE 1 TABLET EVERY DAY 6/9/21  Yes Carl Steiner MD   meloxicam (MOBIC) 15 mg tablet TAKE 1 TABLET EVERY DAY 6/9/21  Yes Jovanny Franco NP   hydroCHLOROthiazide (HYDRODIURIL) 12.5 mg tablet TAKE 1 TABLET EVERY DAY 6/9/21  Yes Jovanny Franco NP   traMADoL (ULTRAM) 50 mg tablet TAKE 1 TABLET BY MOUTH EVERY 4 HOURS AS NEEDED FOR PAIN 5/10/21  Yes Provider, Historical   Insulin Needles, Disposable, (Droplet Pen Needle) 31 gauge x 5/16\" ndle Use to take insulin once a day, 90 days 5/21/21  Yes Carl Steiner MD   insulin detemir U-100 (LEVEMIR FLEXTOUCH) 100 unit/mL (3 mL) inpn Inject 10 units at bedtime, 90 days 5/21/21  Yes Carl Steiner MD   glucose blood VI test strips (Accu-Chek Guide test strips) strip Once a day 5/21/21  Yes Bina Osorio MD   Blood-Glucose Meter (Accu-Chek Guide Glucose Meter) misc Once a day 5/21/21  Yes Bina Osorio MD   lidocaine (XYLOCAINE) 2 % jelly APPLY A THIN FILM TO AFFECTED AREA ONLY (LOW BACK AND NECK ONLY) ONE TIME DAILY AS NEEDED 2/18/21  Yes La Alarcon NP   LORazepam (ATIVAN) 0.5 mg tablet Take 1 Tab by mouth two (2) times daily as needed for Anxiety. Max Daily Amount: 1 mg. 12/2/20  Yes Zoë Brewer NP   mometasone (ELOCON) 0.1 % ointment APPLY  TO AFFECTED AREA TWO TIMES DAILY AS NEEDED FOR SKIN IRRITATION. 7/30/20  Yes La Alarcon NP   Biotin 2,500 mcg cap Take  by mouth. Yes Provider, Historical   cetirizine (ZYRTEC) 10 mg tablet TK 1 T PO D PRN 6/18/18  Yes Provider, Historical   naloxone 2 mg/actuation spry Use 1 spray intranasally into 1 nostril. Use a new Narcan nasal spray for subsequent doses and administer into alternating nostrils. May repeat every 2 to 3 minutes as needed. 3/8/18  Yes Catalino Carbajal NP   cholecalciferol, vitamin D3, 2,000 unit tab Take  by mouth. Yes Provider, Historical        No Known Allergies    Review of Systems:  ROS    A comprehensive review of systems was preformed and it is negative except mentioned in HPI    Objective:     Vitals:    10/06/21 1315   BP: 113/76   Pulse: (!) 108   Temp: 97.5 °F (36.4 °C)   TempSrc: Temporal   SpO2: 100%   Weight: 157 lb 12.8 oz (71.6 kg)   Height: 5' 4\" (1.626 m)        Physical Exam:    Physical Exam  Vitals and nursing note reviewed. Constitutional:       Appearance: Normal appearance. HENT:      Head: Normocephalic and atraumatic. Eyes:      Extraocular Movements: Extraocular movements intact. Cardiovascular:      Rate and Rhythm: Normal rate and regular rhythm. Pulmonary:      Effort: Pulmonary effort is normal.      Breath sounds: Normal breath sounds. Skin:     General: Skin is warm and dry. Neurological:      Mental Status: She is alert.    Psychiatric:         Mood and Affect: Mood normal.         Behavior: Behavior normal.         Thought Content: Thought content normal.         Judgment: Judgment normal.     11-:   diabetic foot exam:  Bilateral diabetic foot exam was performed today. Dorsalis pedis pulses 2+ bilaterally. Monofilament sensation normal bilaterally. No ulcers or skin breakdown. Labs and Imaging:  Results for Sanket Herron (MRN [id number]) as of 5/21/2021 12:05   Ref. Range 2/24/2021 10:04   Sodium Latest Ref Range: 134 - 144 mmol/L 143   Potassium Latest Ref Range: 3.5 - 5.2 mmol/L 4.7   Chloride Latest Ref Range: 96 - 106 mmol/L 100   CO2 Latest Ref Range: 20 - 29 mmol/L 28   Glucose Latest Ref Range: 65 - 99 mg/dL 101 (H)   BUN Latest Ref Range: 8 - 27 mg/dL 14   Creatinine Latest Ref Range: 0.57 - 1.00 mg/dL 1.00   BUN/Creatinine ratio Latest Ref Range: 12 - 28  14   Calcium Latest Ref Range: 8.7 - 10.3 mg/dL 10.1   GFR est non-AA Latest Ref Range: >59 mL/min/1.73 61   GFR est AA Latest Ref Range: >59 mL/min/1.73 70   Bilirubin, total Latest Ref Range: 0.0 - 1.2 mg/dL 1.5 (H)   Protein, total Latest Ref Range: 6.0 - 8.5 g/dL 7.3   Albumin Latest Ref Range: 3.8 - 4.8 g/dL 4.7   A-G Ratio Latest Ref Range: 1.2 - 2.2  1.8   ALT Latest Ref Range: 0 - 32 IU/L 13   AST Latest Ref Range: 0 - 40 IU/L 15   Alk. phosphatase Latest Ref Range: 39 - 117 IU/L 87   Results for XIOMARA MALCOLM (MRN [id number]) as of 5/21/2021 12:05   Ref. Range 2/24/2021 10:04   Triglyceride Latest Ref Range: 0 - 149 mg/dL 175 (H)   Cholesterol, total Latest Ref Range: 100 - 199 mg/dL 151   HDL Cholesterol Latest Ref Range: >39 mg/dL 62   VLDL, calculated Latest Ref Range: 5 - 40 mg/dL 29   LDL, calculated Latest Ref Range: 0 - 99 mg/dL 60   Results for XIOMARA MALCOLM (MRN [id number]) as of 5/21/2021 12:05   Ref.  Range 2/24/2021 10:04   Creatinine, urine Latest Ref Range: Not Estab. mg/dL 117.2   Microalbumin, urine Latest Ref Range: Not Estab. ug/mL 7.4 Microalbumin/Creat. Ratio Latest Ref Range: 0 - 29 mg/g creat 6   TSH Latest Ref Range: 0.450 - 4.500 uIU/mL 2.320     Last 3 Recorded Weights in this Encounter    10/06/21 1315   Weight: 157 lb 12.8 oz (71.6 kg)        Lab Results   Component Value Date/Time    Hemoglobin A1c 6.1 (H) 09/16/2019 02:28 PM    Hemoglobin A1c 7.3 (H) 12/14/2018 11:12 AM    Hemoglobin A1c 7.0 (H) 09/17/2018 10:45 AM    Hemoglobin A1c, External 6.3 01/24/2020 12:00 AM        Assessment:     Patient Active Problem List   Diagnosis Code    Lumbar stenosis M48.061    GERD (gastroesophageal reflux disease) K21.9    Dysphagia R13.10    Status post gastric banding Z98.84    DDD (degenerative disc disease), lumbar M51.36    Depression F32. A    Diverticulosis K57.90    NICOLE (obstructive sleep apnea) G47.33    Diabetes mellitus ([facility] [address] E11.9    Urinary frequency R35.0    Advanced care planning/counseling discussion Z71.89    Type 2 diabetes mellitus with diabetic neuropathy ([facility] [address] E11.40           Plan:     type 2 diabetes mellitus uncontrolled   Hemoglobin A1c was 7.3% on 12-, 6.8% on 4-8-2019, 6.8% in 7-, 6.3% on 1-, 7.9% on 7-, 6.4% on 11-, 7.4% on 5-, 7.4% on 8-. Fingerstick blood glucose is 322 mg/dL in my office today. Urine is showing moderate ketones. Up to date with diabetes related annual labs: yes 2-  Up to date with diabetic eye exam: yes February 2021    plan:  Looking at how her glucose has worsened and she has lost weight, she has ketones in urine after stopping Levemir I am going to put her back on insulin but this time we will do NPH 10 units in the morning, so there is decrease risk of nocturnal hypoglycemia. Continue metformin 1000 mg twice a day, Invokana 300 mg daily and Januvia 100 mg daily. Discussed with patient that she should avoid all diet beverages.   Check blood glucose twice a day every day and call my office with her glucose readings in 7 to 10 days.  At next visit I will consider switching her from Januvia to GLP-1 agonist.    essential hypertension   blood pressure well-controlled on current medications. No microalbuminuria, last checked in February 2021. mixed hyperlipidemia   LDL was adequate at 36 in 1/2020, 60 in February 2021. Continue Crestor 5 mg daily. intermittent confusion   resolved after decreasing insulin.     Orders Placed This Encounter    AMB POC GLUCOSE BLOOD, BY GLUCOSE MONITORING DEVICE    AMB POC URINALYSIS DIP STICK AUTO W/O MICRO    insulin NPH (HUMULIN N) 100 unit/mL (3 mL) inpn     Sig: Inject 10 units in am     Dispense:  2 Adjustable Dose Pre-filled Pen Syringe     Refill:  3        Signed By: Luc Sotelo MD     October 6, 2021      Return to clinic 6 weeks

## 2021-10-06 NOTE — LETTER
10/6/2021    Patient: Sandra Dykes   YOB: 1960   Date of Visit: 10/6/2021     Davide Miranda [place]  [id number] [id number]  Via Fax: [phone]    Dear Davide Miranda MD,      Thank you for referring Ms. Mery Rodas to [address] [place] [address] ENDOCRINOLOGY for evaluation. My notes for this consultation are attached. If you have questions, please do not hesitate to call me. I look forward to following your patient along with you.       Sincerely,    Osiel Mcrae MD

## 2021-10-07 DIAGNOSIS — Z79.4 TYPE 2 DIABETES MELLITUS WITH OTHER SPECIFIED COMPLICATION, WITH LONG-TERM CURRENT USE OF INSULIN (HCC): Primary | ICD-10-CM

## 2021-10-07 DIAGNOSIS — E11.69 TYPE 2 DIABETES MELLITUS WITH OTHER SPECIFIED COMPLICATION, WITH LONG-TERM CURRENT USE OF INSULIN (HCC): Primary | ICD-10-CM

## 2021-10-07 RX ORDER — HUMAN INSULIN 100 [IU]/ML
INJECTION, SUSPENSION SUBCUTANEOUS
Qty: 2 ADJUSTABLE DOSE PRE-FILLED PEN SYRINGE | Refills: 3 | Status: SHIPPED | OUTPATIENT
Start: 2021-10-07 | End: 2021-11-26 | Stop reason: ALTCHOICE

## 2021-10-15 ENCOUNTER — TELEPHONE (OUTPATIENT)
Dept: ENDOCRINOLOGY | Age: 61
End: 2021-10-15

## 2021-10-15 DIAGNOSIS — Z79.4 TYPE 2 DIABETES MELLITUS WITH OTHER SPECIFIED COMPLICATION, WITH LONG-TERM CURRENT USE OF INSULIN (HCC): Primary | ICD-10-CM

## 2021-10-15 DIAGNOSIS — E11.69 TYPE 2 DIABETES MELLITUS WITH OTHER SPECIFIED COMPLICATION, WITH LONG-TERM CURRENT USE OF INSULIN (HCC): Primary | ICD-10-CM

## 2021-10-15 RX ORDER — INSULIN LISPRO 100 [IU]/ML
INJECTION, SOLUTION INTRAVENOUS; SUBCUTANEOUS
Qty: 4 ADJUSTABLE DOSE PRE-FILLED PEN SYRINGE | Refills: 3 | Status: SHIPPED | OUTPATIENT
Start: 2021-10-15 | End: 2021-10-15 | Stop reason: ALTCHOICE

## 2021-10-15 RX ORDER — PEN NEEDLE, DIABETIC 30 GX3/16"
NEEDLE, DISPOSABLE MISCELLANEOUS
Qty: 200 EACH | Refills: 3 | Status: SHIPPED | OUTPATIENT
Start: 2021-10-15 | End: 2021-11-26 | Stop reason: SDUPTHER

## 2021-10-15 RX ORDER — INSULIN ASPART 100 [IU]/ML
INJECTION, SOLUTION INTRAVENOUS; SUBCUTANEOUS
Qty: 4 ADJUSTABLE DOSE PRE-FILLED PEN SYRINGE | Refills: 3 | Status: SHIPPED | OUTPATIENT
Start: 2021-10-15

## 2021-10-15 NOTE — TELEPHONE ENCOUNTER
Patient called in stated that she got an e-mail from her pharmacy stating that she needed to contact her doctor to let you know that an alternative medication is needed for her Humalog

## 2021-10-15 NOTE — TELEPHONE ENCOUNTER
Pt called in to give 1 week of readings  10/6 M 273  N 479  10/7 M 253 N 529  10/8 M  323  N 347  10/9 M 212 N 278  10/10 M 479 N 324  10/11 M 245 N 288  10/12 M 256 N 279  10/13 M 256 N 271  10/14 M 233 N 399  This morning 234

## 2021-10-15 NOTE — TELEPHONE ENCOUNTER
Spoke to patient. Explained to patient that looking at the overall picture we need to differentiate whether she has switched to type 1 diabetes. I am ordering labs for her. She can get them drawn at [facility]. For now I am going to switch her back from NPH to Levemir and we will do a basal bolus regimen:  Levemir 15 units at bedtime. Humalog 1 unit for every 50 mg/dL blood glucose above 150 mg/dL based on blood glucose that she will check 3 times a day before meals. Patient made notes. Expressed understanding.

## 2021-10-15 NOTE — TELEPHONE ENCOUNTER
Pt has been informed and stated that she was currently at the drugstore and would let us know if she has any issues

## 2021-10-18 ENCOUNTER — TELEPHONE (OUTPATIENT)
Dept: PRIMARY CARE CLINIC | Age: 61
End: 2021-10-18

## 2021-10-25 LAB
BUN SERPL-MCNC: 14 MG/DL (ref 8–27)
BUN/CREAT SERPL: 15 (ref 12–28)
C PEPTIDE SERPL-MCNC: 0.6 NG/ML (ref 1.1–4.4)
CALCIUM SERPL-MCNC: 9.8 MG/DL (ref 8.7–10.3)
CHLORIDE SERPL-SCNC: 98 MMOL/L (ref 96–106)
CO2 SERPL-SCNC: 29 MMOL/L (ref 20–29)
CREAT SERPL-MCNC: 0.95 MG/DL (ref 0.57–1)
GAD65 AB SER IA-ACNC: 7402.9 U/ML (ref 0–5)
GLUCOSE SERPL-MCNC: 144 MG/DL (ref 65–99)
ISLET CELL512 AB SER-ACNC: 87 U/ML
POTASSIUM SERPL-SCNC: 4.2 MMOL/L (ref 3.5–5.2)
SODIUM SERPL-SCNC: 140 MMOL/L (ref 134–144)
ZNT8 AB: <15 U/ML

## 2021-10-25 RX ORDER — MONTELUKAST SODIUM 10 MG/1
TABLET ORAL
Qty: 90 TABLET | Refills: 0 | Status: SHIPPED | OUTPATIENT
Start: 2021-10-25

## 2021-10-26 DIAGNOSIS — M51.36 DDD (DEGENERATIVE DISC DISEASE), LUMBAR: ICD-10-CM

## 2021-10-26 RX ORDER — GABAPENTIN 300 MG/1
CAPSULE ORAL
Qty: 270 CAPSULE | Refills: 0 | OUTPATIENT
Start: 2021-10-26

## 2021-10-27 NOTE — TELEPHONE ENCOUNTER
Last saw Ana Fonseca 10 months ago, due to controlled substances regulations she needs a visit at least once every 6 months for this rx. I can send a small supply to her pharmacy once she has scheduled appt so that she does not run out.

## 2021-11-26 ENCOUNTER — OFFICE VISIT (OUTPATIENT)
Dept: ENDOCRINOLOGY | Age: 61
End: 2021-11-26
Payer: MEDICARE

## 2021-11-26 VITALS
WEIGHT: 157.8 LBS | DIASTOLIC BLOOD PRESSURE: 89 MMHG | OXYGEN SATURATION: 96 % | HEART RATE: 80 BPM | TEMPERATURE: 97.8 F | SYSTOLIC BLOOD PRESSURE: 131 MMHG | HEIGHT: 64 IN | BODY MASS INDEX: 26.94 KG/M2

## 2021-11-26 DIAGNOSIS — E13.9 LADA (LATENT AUTOIMMUNE DIABETES IN ADULTS), MANAGED AS TYPE 1 (HCC): ICD-10-CM

## 2021-11-26 DIAGNOSIS — E10.69 TYPE 1 DIABETES MELLITUS WITH OTHER SPECIFIED COMPLICATION (HCC): Primary | ICD-10-CM

## 2021-11-26 LAB
GLUCOSE POC: 111 MG/DL
HBA1C MFR BLD HPLC: 9.4 %

## 2021-11-26 PROCEDURE — 83036 HEMOGLOBIN GLYCOSYLATED A1C: CPT | Performed by: INTERNAL MEDICINE

## 2021-11-26 PROCEDURE — G9899 SCRN MAM PERF RSLTS DOC: HCPCS | Performed by: INTERNAL MEDICINE

## 2021-11-26 PROCEDURE — 3046F HEMOGLOBIN A1C LEVEL >9.0%: CPT | Performed by: INTERNAL MEDICINE

## 2021-11-26 PROCEDURE — 2022F DILAT RTA XM EVC RTNOPTHY: CPT | Performed by: INTERNAL MEDICINE

## 2021-11-26 PROCEDURE — 3017F COLORECTAL CA SCREEN DOC REV: CPT | Performed by: INTERNAL MEDICINE

## 2021-11-26 PROCEDURE — G9717 DOC PT DX DEP/BP F/U NT REQ: HCPCS | Performed by: INTERNAL MEDICINE

## 2021-11-26 PROCEDURE — G8427 DOCREV CUR MEDS BY ELIG CLIN: HCPCS | Performed by: INTERNAL MEDICINE

## 2021-11-26 PROCEDURE — 82962 GLUCOSE BLOOD TEST: CPT | Performed by: INTERNAL MEDICINE

## 2021-11-26 PROCEDURE — 99214 OFFICE O/P EST MOD 30 MIN: CPT | Performed by: INTERNAL MEDICINE

## 2021-11-26 PROCEDURE — G8419 CALC BMI OUT NRM PARAM NOF/U: HCPCS | Performed by: INTERNAL MEDICINE

## 2021-11-26 RX ORDER — METFORMIN HYDROCHLORIDE 500 MG/1
TABLET ORAL
Qty: 360 TABLET | Refills: 1 | Status: SHIPPED | OUTPATIENT
Start: 2021-11-26 | End: 2022-02-28

## 2021-11-26 RX ORDER — FLASH GLUCOSE SENSOR
KIT MISCELLANEOUS
Qty: 2 KIT | Refills: 5 | Status: SHIPPED | OUTPATIENT
Start: 2021-11-26 | End: 2021-11-29 | Stop reason: SDUPTHER

## 2021-11-26 RX ORDER — FLASH GLUCOSE SCANNING READER
EACH MISCELLANEOUS
Qty: 1 EACH | Refills: 0 | Status: SHIPPED | OUTPATIENT
Start: 2021-11-26 | End: 2021-11-29 | Stop reason: SDUPTHER

## 2021-11-26 RX ORDER — INSULIN ASPART 100 [IU]/ML
INJECTION, SOLUTION INTRAVENOUS; SUBCUTANEOUS
Qty: 9 ADJUSTABLE DOSE PRE-FILLED PEN SYRINGE | Refills: 1 | Status: SHIPPED | OUTPATIENT
Start: 2021-11-26 | End: 2022-01-10

## 2021-11-26 RX ORDER — PEN NEEDLE, DIABETIC 30 GX3/16"
NEEDLE, DISPOSABLE MISCELLANEOUS
Qty: 200 EACH | Refills: 3 | Status: SHIPPED | OUTPATIENT
Start: 2021-11-26 | End: 2022-05-31 | Stop reason: SDUPTHER

## 2021-11-26 NOTE — LETTER
11/26/2021    Patient: Ruthy Rudd   YOB: 1960   Date of Visit: 11/26/2021     King Wynn MD  [address] [place] [address]  Via Fax: [phone]    Dear King Wynn MD,      Thank you for referring Ms. Dom Cabrales to [address] [place] [address] for evaluation. My notes for this consultation are attached. If you have questions, please do not hesitate to call me. I look forward to following your patient along with you.       Sincerely,    Adolfo Turpin MD

## 2021-11-26 NOTE — PATIENT INSTRUCTIONS
DC Januvia, Invokana    Continue metformin 500 mg 2 tabs twice a day    Levemir 15 units at bedtime    Novolog 1 unit for 15 grams of carbs (calculate carbs and divide by 15)+ scale    Low dose correction Scale   Dosing Scale:  150-200 mg/dL add 1 unit insulin  201-250 mg/dL add 2 units insulin  251-300 mg/dL add 3 units insulin  301-350 mg/dL add 4 units insulin  351-400 mg/dL add 5 units insulin  401-450 mg/dL add 6 units insulin  >450 mg/dL add 7 units insulin     Apps: Jamila wolfe, my fitness pal

## 2021-11-26 NOTE — PROGRESS NOTES
History and Physical    Patient: Jesenia Cho MRN: [id number]  SSN: [id number]    YOB: 1960  Age: 59 y.o. Sex: female      Subjective:      Jesenia Cho is a 59 y.o. female with past medical history of hypertension, hyperlipidemia, obesity status post lap band in 2008 (lost 100 pounds) is here for follow-up of type 2 diabetes mellitus. She remains in primary care of Dr. Duglas Spann. At the last visit I had noticed major worsening of her blood glucose. At that time initially I started her on NPH insulin because of nocturnal hypoglycemia with Levemir previously. Patient called me back with her glucose readings which were all high. At that time labs were done to check if patient has switched from type II to type 1 diabetes as well as I put her on basal bolus regimen with Levemir 15 units at bedtime, NovoLog 1 unit for 50 mg/dL blood glucose above 150 mg/dL. Labs confirm type 1 diabetes. Patient has made some major changes in her eating habits, overall trying to eat healthy. She has lost 13 pounds since last visit. She has started checking blood glucose 3 times a day. Denies any hypoglycemia. Interested in [address] [place] [address]. Glucometer reading: Checking blood glucose 3 times a day.   Readings are ranging from 113 to 300 mg/dL    Updated diabetes history:  · Diagnosis: 5 years  · Current treatment: Metformin 500 mg 2 tabs bid, Invokana 300 mg daily, Januvia 100 mg daily, Levemir 15 units at bedtime, NovoLog low-dose correction factor before meals  · Past treatment: Levemir (low blood glucose)  · Glucose checks: 1-2 times a day as above  · Hyperglycemia: rarely  · Hypoglycemia: yes  · Meals per day: 3, breakfast: egg/breakfast bar, lunch: squash and string beans, cabbage and chicken, dinner: chicken, shrimp, fish, snacks: bakes pretzels  · Exercise: sometimes walks when it is warm  · DM related hospitalizations: no  Complications of DM:  · CAD: no  · CVA: no  · PVD: no  · Amputations: no   · Retinopathy: no; last exam was 1-2021  · Gastropathy: no  · Nephropathy: no  · Neuropathy: no  Medications:  · Statin: crestor 5 mg  · ACE-I: no  · ASA: no  · Diabetes education: no      Past Medical History:   Diagnosis Date    Arrhythmia     \"IRREG HEARTBEAT\"  (DR MARTINEZ)    Arthritis     Chronic pain     BACK    Depression     Diabetes [facility]     Dyspepsia and other specified disorders of function of stomach     GERD (gastroesophageal reflux disease)     Headache     Headache(784.0)     Hypertension     Hypertension 7/14/2020    Hyperthyroidism     Morbid obesity ([facility]     Psychiatric disorder     DEPRESSION    Sleep apnea     no CPAP    Stool color black     Sun-damaged skin     Tanning bed exposure      Past Surgical History:   Procedure Laterality Date    HAND/FINGER SURGERY UNLISTED      x3    HX BACK SURGERY      x4- 2001,2003,2009,2011    HX CHOLECYSTECTOMY  2002    HX GASTRIC BYPASS  5/2008    LAP BAND ( RESHMA RODRIGUEZ)    HX HEENT  2006    LASIK BILAT    HX HYSTERECTOMY  2005    HX ORTHOPAEDIC  2006,2010    HANDS  RIGHT X2; LEFT X1    HX TONSILLECTOMY  1964    HX TUBAL LIGATION  6531,[id number] REV    x2 and a reversal    HX UROLOGICAL  04/29/2015    urethra sling    IR INJ SPINE FLUORO GUIDED  8/3/2020    IR INJ SPINE FLUORO GUIDED  10/5/2020    NV LAP, PLACE ADJUST GASTR BAND  5/2008      Family History   Problem Relation Age of Onset    Post-op Nausea/Vomiting Mother     Delayed Awakening Mother     Psychiatric Disorder Mother     Hypertension Mother     Stroke Mother     Headache Mother     Post-op Nausea/Vomiting Daughter     Delayed Awakening Daughter     Cancer Father     Hypertension Father     Cancer Maternal Grandmother     Psychiatric Disorder Maternal Grandmother     Dementia Paternal Grandmother     Dementia Paternal Grandfather     Parkinson's Disease Paternal Grandfather      Social History     Tobacco Use    Smoking status: Never Smoker    Smokeless tobacco: Never Used   Substance Use Topics    Alcohol use: No      Prior to Admission medications    Medication Sig Start Date End Date Taking?  Authorizing Provider   insulin detemir U-100 (LEVEMIR FLEXTOUCH) 100 unit/mL (3 mL) inpn Inject 15 units at bedtime 11/26/21  Yes Mira Gaytan MD   insulin aspart U-100 (NOVOLOG) 100 unit/mL (3 mL) inpn 1 unit for 15 grams of carbs + scale max daily dose 30 units, 90 days 11/26/21  Yes Mira Gaytan MD   Insulin Needles, Disposable, 31 gauge x 5/16\" ndle 4 times a day 11/26/21  Yes Mira Gaytan MD   metFORMIN (GLUCOPHAGE) 500 mg tablet 2 tabs twice a day 11/26/21  Yes Mira Gaytan MD   flash glucose sensor (FreeStyle Edilberto 2 Sensor) kit Check glucose 4 times a day 11/26/21  Yes Mira Gaytan MD   flash glucose scanning reader (FreeStyle Edilbreto 2 [place]) misc Check glucose 4 times a day 11/26/21  Yes Mira Gaytan MD   montelukast (SINGULAIR) 10 mg tablet TAKE 1 TABLET EVERY DAY 10/25/21  Yes Leighann López MD   insulin aspart U-100 (NOVOLOG) 100 unit/mL (3 mL) inpn Sliding scale before meals, maximum daily dose 40 units 10/15/21  Yes Mira Gaytan MD   alcohol swabs (BD Single Use Swabs Regular) padm USE THREE TIMES DAILY 9/27/21  Yes Mira Gaytan MD   lancets (Accu-Chek Softclix Lancets) misc TEST BLOOD SUGAR EVERY DAY 9/27/21  Yes Mira Gaytan MD   omeprazole (PRILOSEC) 20 mg capsule TAKE 1 CAPSULE EVERY DAY 8/4/21  Yes Maureen Lang NP   rosuvastatin (CRESTOR) 5 mg tablet TAKE 1 TABLET EVERY NIGHT 8/4/21  Yes Mira Gaytan MD   gabapentin (NEURONTIN) 300 mg capsule TAKE 1 CAPSULE THREE TIMES DAILY 6/14/21  Yes Maureen Lang NP   meloxicam (MOBIC) 15 mg tablet TAKE 1 TABLET EVERY DAY 6/9/21  Yes Maureen Lang NP   hydroCHLOROthiazide (HYDRODIURIL) 12.5 mg tablet TAKE 1 TABLET EVERY DAY 6/9/21  Yes Barbette Precious, NP   traMADoL (ULTRAM) 50 mg tablet TAKE 1 TABLET BY MOUTH EVERY 4 HOURS AS NEEDED FOR PAIN 5/10/21  Yes Provider, Historical   Insulin Needles, Disposable, (Droplet Pen Needle) 31 gauge x 5/16\" ndle Use to take insulin once a day, 90 days 5/21/21  Yes Lolita Iyer MD   glucose blood VI test strips (Accu-Chek Guide test strips) strip Once a day 5/21/21  Yes Lolita Iyer MD   Blood-Glucose Meter (Accu-Chek Guide Glucose Meter) misc Once a day 5/21/21  Yes Lolita Iyer MD   lidocaine (XYLOCAINE) 2 % jelly APPLY A THIN FILM TO AFFECTED AREA ONLY (LOW BACK AND NECK ONLY) ONE TIME DAILY AS NEEDED 2/18/21  Yes Vanessa Mahajan NP   LORazepam (ATIVAN) 0.5 mg tablet Take 1 Tab by mouth two (2) times daily as needed for Anxiety. Max Daily Amount: 1 mg. 12/2/20  Yes Zoë Bojorquez NP   mometasone (ELOCON) 0.1 % ointment APPLY  TO AFFECTED AREA TWO TIMES DAILY AS NEEDED FOR SKIN IRRITATION. 7/30/20  Yes Vanessa Mahajan NP   Biotin 2,500 mcg cap Take  by mouth. Yes Provider, Historical   cetirizine (ZYRTEC) 10 mg tablet TK 1 T PO D PRN 6/18/18  Yes Provider, Historical   naloxone 2 mg/actuation spry Use 1 spray intranasally into 1 nostril. Use a new Narcan nasal spray for subsequent doses and administer into alternating nostrils. May repeat every 2 to 3 minutes as needed. 3/8/18  Yes Sonja Flores NP   cholecalciferol, vitamin D3, 2,000 unit tab Take  by mouth. Yes Provider, Historical   glucose blood VI test strips (Accu-Chek Larissa Plus test strp) strip TEST BLOOD SUGAR TWICE DAILY AS DIRECTED 8/2/21   Lolita Iyer MD        No Known Allergies    Review of Systems:  ROS    A comprehensive review of systems was preformed and it is negative except mentioned in HPI    Objective:     Vitals:    11/26/21 1515   BP: 131/89   Pulse: 80   Temp: 97.8 °F (36.6 °C)   TempSrc: Temporal   SpO2: 96%   Weight: 157 lb 12.8 oz (71.6 kg)   Height: 5' 4\" (1.626 m)        Physical Exam:    Physical Exam  Vitals and nursing note reviewed. Constitutional:       Appearance: Normal appearance.    HENT:      Head: Normocephalic and atraumatic. Eyes:      Extraocular Movements: Extraocular movements intact. Cardiovascular:      Rate and Rhythm: Normal rate and regular rhythm. Pulmonary:      Effort: Pulmonary effort is normal.      Breath sounds: Normal breath sounds. Skin:     General: Skin is warm and dry. Neurological:      Mental Status: She is alert. Psychiatric:         Mood and Affect: Mood normal.         Behavior: Behavior normal.         Thought Content: Thought content normal.         Judgment: Judgment normal.     11-:   diabetic foot exam:  Bilateral diabetic foot exam was performed today. Dorsalis pedis pulses 2+ bilaterally. Monofilament sensation normal bilaterally. No ulcers or skin breakdown. Labs and Imaging:  Results for Kory Marshall (MRN [id number]) as of 5/21/2021 12:05   Ref. Range 2/24/2021 10:04   Sodium Latest Ref Range: 134 - 144 mmol/L 143   Potassium Latest Ref Range: 3.5 - 5.2 mmol/L 4.7   Chloride Latest Ref Range: 96 - 106 mmol/L 100   CO2 Latest Ref Range: 20 - 29 mmol/L 28   Glucose Latest Ref Range: 65 - 99 mg/dL 101 (H)   BUN Latest Ref Range: 8 - 27 mg/dL 14   Creatinine Latest Ref Range: 0.57 - 1.00 mg/dL 1.00   BUN/Creatinine ratio Latest Ref Range: 12 - 28  14   Calcium Latest Ref Range: 8.7 - 10.3 mg/dL 10.1   GFR est non-AA Latest Ref Range: >59 mL/min/1.73 61   GFR est AA Latest Ref Range: >59 mL/min/1.73 70   Bilirubin, total Latest Ref Range: 0.0 - 1.2 mg/dL 1.5 (H)   Protein, total Latest Ref Range: 6.0 - 8.5 g/dL 7.3   Albumin Latest Ref Range: 3.8 - 4.8 g/dL 4.7   A-G Ratio Latest Ref Range: 1.2 - 2.2  1.8   ALT Latest Ref Range: 0 - 32 IU/L 13   AST Latest Ref Range: 0 - 40 IU/L 15   Alk. phosphatase Latest Ref Range: 39 - 117 IU/L 87   Results for XIOMARA MALCOLM (MRN [id number]) as of 5/21/2021 12:05   Ref.  Range 2/24/2021 10:04   Triglyceride Latest Ref Range: 0 - 149 mg/dL 175 (H)   Cholesterol, total Latest Ref Range: 100 - 199 mg/dL 151   HDL Cholesterol Latest Ref Range: >39 mg/dL 62   VLDL, calculated Latest Ref Range: 5 - 40 mg/dL 29   LDL, calculated Latest Ref Range: 0 - 99 mg/dL 60   Results for XIOMARA MALCOLM (MRN [id number]) as of 5/21/2021 12:05   Ref. Range 2/24/2021 10:04   Creatinine, urine Latest Ref Range: Not Estab. mg/dL 117.2   Microalbumin, urine Latest Ref Range: Not Estab. ug/mL 7.4   Microalbumin/Creat. Ratio Latest Ref Range: 0 - 29 mg/g creat 6   TSH Latest Ref Range: 0.450 - 4.500 uIU/mL 2.320     Last 3 Recorded Weights in this Encounter    11/26/21 1515   Weight: 157 lb 12.8 oz (71.6 kg)        Lab Results   Component Value Date/Time    Hemoglobin A1c 6.1 (H) 09/16/2019 02:28 PM    Hemoglobin A1c 7.3 (H) 12/14/2018 11:12 AM    Hemoglobin A1c 7.0 (H) 09/17/2018 10:45 AM    Hemoglobin A1c, External 6.3 01/24/2020 12:00 AM        Assessment:     Patient Active Problem List   Diagnosis Code    Lumbar stenosis M48.061    GERD (gastroesophageal reflux disease) K21.9    Dysphagia R13.10    Status post gastric banding Z98.84    DDD (degenerative disc disease), lumbar M51.36    Depression F32. A    Diverticulosis K57.90    NICOLE (obstructive sleep apnea) G47.33    Diabetes mellitus ([facility] [address] E11.9    Urinary frequency R35.0    Advanced care planning/counseling discussion Z71.89    Type 2 diabetes mellitus with diabetic neuropathy ([facility] [address] E11.40           Plan:     BEULAH/ type 1 diabetes mellitus uncontrolled   Hemoglobin A1c was 7.3% on 12-, 6.8% on 4-8-2019, 6.8% in 7-, 6.3% on 1-, 7.9% on 7-, 6.4% on 11-, 7.4% on 5-, 7.4% on 8-, 9.4% today. Fingerstick blood glucose is 111 mg/dL in my office today. Up to date with diabetes related annual labs: yes 2-  Up to date with diabetic eye exam: yes February 2021  10-: Glucose 144, C-peptide low at 0.6, margaret 65 antibody very high at 7402, IA 2 antibody elevated at 87  plan:  Stop Invokana, Januvia.   For now continue metformin 500 mg 2 tablets twice a day. Discussed with patient pathophysiology of type 1 diabetes mellitus and that she will be dependent on insulin for the rest of her life. Continue Levemir 15 units at bedtime. Start NovoLog 1 unit for 15 g of carbohydrate and continue correction factor I unit for 50 mg/dL blood glucose above 150 mg/dL. Patient has type 1 diabetes mellitus, she needs to check blood glucose 3-4 times a day, she is on 4 insulin injections a day, should benefit from CGM. I am going to send prescription to [facility].  I will see her back in 3 months. essential hypertension   blood pressure well-controlled on current medications. No microalbuminuria, last checked in 2021. mixed hyperlipidemia   LDL was adequate at 36 in 2020, 60 in 2021. Continue Crestor 5 mg daily. intermittent confusion   resolved after decreasing insulin.     Orders Placed This Encounter    AMB POC GLUCOSE BLOOD, BY GLUCOSE MONITORING DEVICE    AMB POC HEMOGLOBIN A1C    insulin detemir U-100 (LEVEMIR FLEXTOUCH) 100 unit/mL (3 mL) inpn     Sig: Inject 15 units at bedtime     Dispense:  6 Adjustable Dose Pre-filled Pen Syringe     Refill:  2    insulin aspart U-100 (NOVOLOG) 100 unit/mL (3 mL) inpn     Si unit for 15 grams of carbs + scale max daily dose 30 units, 90 days     Dispense:  9 Adjustable Dose Pre-filled Pen Syringe     Refill:  1    Insulin Needles, Disposable, 31 gauge x 5/16\" ndle     Si times a day     Dispense:  200 Each     Refill:  3    metFORMIN (GLUCOPHAGE) 500 mg tablet     Si tabs twice a day     Dispense:  360 Tablet     Refill:  1    flash glucose sensor (FreeStyle Edilberto 2 Sensor) kit     Sig: Check glucose 4 times a day     Dispense:  2 Kit     Refill:  5    flash glucose scanning reader (FreeStyle Edilberto 2 [place]) misc     Sig: Check glucose 4 times a day     Dispense:  1 Each     Refill:  0        Signed By: Librado Garcia MD     2021      Return to clinic 3 months

## 2021-11-29 ENCOUNTER — TELEPHONE (OUTPATIENT)
Dept: ENDOCRINOLOGY | Age: 61
End: 2021-11-29

## 2021-11-29 DIAGNOSIS — E13.9 LADA (LATENT AUTOIMMUNE DIABETES IN ADULTS), MANAGED AS TYPE 1 (HCC): ICD-10-CM

## 2021-11-29 RX ORDER — FLASH GLUCOSE SENSOR
KIT MISCELLANEOUS
Qty: 2 KIT | Refills: 5 | Status: SHIPPED | OUTPATIENT
Start: 2021-11-29

## 2021-11-29 RX ORDER — FLASH GLUCOSE SCANNING READER
EACH MISCELLANEOUS
Qty: 1 EACH | Refills: 0 | Status: SHIPPED | OUTPATIENT
Start: 2021-11-29

## 2021-11-29 NOTE — TELEPHONE ENCOUNTER
Please inform patient that I am sending freestyle arnulfo prescription to [address] [place] [address] because of her insurance. She should be receiving a call from them for additional information.

## 2021-12-15 ENCOUNTER — TELEPHONE (OUTPATIENT)
Dept: ENDOCRINOLOGY | Age: 61
End: 2021-12-15

## 2021-12-15 NOTE — TELEPHONE ENCOUNTER
I had already sent prescription for freestyle arnulfo to [facility] at her last appointment. But it got denied by her insurance because continuous glucose monitors are not covered by her pharmacy benefits. They are covered by her Medicare benefits. That is why I sent it to a DME company called [facility].  She should have received a call from them, if not, please give her the phone number so she can contact them and give them whatever information they need to proceed with this. However, if she wants to get freestyle arnulfo from [facility]  [employer], she will have to pay out-of-pocket $75 per month.

## 2021-12-15 NOTE — TELEPHONE ENCOUNTER
Patient called in stated that her pharmacy advised her to ask about the [facility] because it will be cheaper for her to get than the Dexcom.  She wants to switch to the [facility] because the pharmacist stated to her that they do about the same thing one is just cheaper than the other

## 2022-01-12 NOTE — TELEPHONE ENCOUNTER
Detail Level: Detailed Patient is calling about needing alcohol swab refill sent in to [facility] mail order

## 2022-02-28 DIAGNOSIS — E10.69 TYPE 1 DIABETES MELLITUS WITH OTHER SPECIFIED COMPLICATION (HCC): ICD-10-CM

## 2022-02-28 RX ORDER — METFORMIN HYDROCHLORIDE 500 MG/1
TABLET ORAL
Qty: 360 TABLET | Refills: 1 | Status: SHIPPED | OUTPATIENT
Start: 2022-02-28 | End: 2022-05-31 | Stop reason: SDUPTHER

## 2022-03-02 ENCOUNTER — OFFICE VISIT (OUTPATIENT)
Dept: ENDOCRINOLOGY | Age: 62
End: 2022-03-02
Payer: MEDICARE

## 2022-03-02 VITALS
WEIGHT: 164.8 LBS | RESPIRATION RATE: 20 BRPM | BODY MASS INDEX: 28.13 KG/M2 | DIASTOLIC BLOOD PRESSURE: 84 MMHG | HEIGHT: 64 IN | HEART RATE: 95 BPM | OXYGEN SATURATION: 100 % | SYSTOLIC BLOOD PRESSURE: 133 MMHG | TEMPERATURE: 97.8 F

## 2022-03-02 DIAGNOSIS — E10.9 TYPE 1 DIABETES MELLITUS WITHOUT COMPLICATIONS (HCC): Primary | ICD-10-CM

## 2022-03-02 DIAGNOSIS — E78.2 MIXED HYPERLIPIDEMIA: ICD-10-CM

## 2022-03-02 DIAGNOSIS — E10.69 TYPE 1 DIABETES MELLITUS WITH OTHER SPECIFIED COMPLICATION (HCC): ICD-10-CM

## 2022-03-02 DIAGNOSIS — I10 BENIGN ESSENTIAL HTN: ICD-10-CM

## 2022-03-02 LAB
BILIRUB UR QL STRIP: NEGATIVE
GLUCOSE POC: 310 MG/DL
GLUCOSE UR-MCNC: NORMAL MG/DL
HBA1C MFR BLD HPLC: 8.1 %
KETONES P FAST UR STRIP-MCNC: NORMAL MG/DL
PH UR STRIP: 6.5 [PH] (ref 4.6–8)
PROT UR QL STRIP: NEGATIVE
SP GR UR STRIP: 1.02 (ref 1–1.03)
UA UROBILINOGEN AMB POC: NORMAL (ref 0.2–1)
URINALYSIS CLARITY POC: NORMAL
URINALYSIS COLOR POC: NORMAL
URINE BLOOD POC: NORMAL
URINE LEUKOCYTES POC: NEGATIVE
URINE NITRITES POC: NEGATIVE

## 2022-03-02 PROCEDURE — 82962 GLUCOSE BLOOD TEST: CPT | Performed by: INTERNAL MEDICINE

## 2022-03-02 PROCEDURE — G8754 DIAS BP LESS 90: HCPCS | Performed by: INTERNAL MEDICINE

## 2022-03-02 PROCEDURE — 3017F COLORECTAL CA SCREEN DOC REV: CPT | Performed by: INTERNAL MEDICINE

## 2022-03-02 PROCEDURE — 81003 URINALYSIS AUTO W/O SCOPE: CPT | Performed by: INTERNAL MEDICINE

## 2022-03-02 PROCEDURE — G9717 DOC PT DX DEP/BP F/U NT REQ: HCPCS | Performed by: INTERNAL MEDICINE

## 2022-03-02 PROCEDURE — 83036 HEMOGLOBIN GLYCOSYLATED A1C: CPT | Performed by: INTERNAL MEDICINE

## 2022-03-02 PROCEDURE — 2022F DILAT RTA XM EVC RTNOPTHY: CPT | Performed by: INTERNAL MEDICINE

## 2022-03-02 PROCEDURE — G8752 SYS BP LESS 140: HCPCS | Performed by: INTERNAL MEDICINE

## 2022-03-02 PROCEDURE — 99214 OFFICE O/P EST MOD 30 MIN: CPT | Performed by: INTERNAL MEDICINE

## 2022-03-02 PROCEDURE — G8419 CALC BMI OUT NRM PARAM NOF/U: HCPCS | Performed by: INTERNAL MEDICINE

## 2022-03-02 PROCEDURE — 3052F HG A1C>EQUAL 8.0%<EQUAL 9.0%: CPT | Performed by: INTERNAL MEDICINE

## 2022-03-02 PROCEDURE — G8427 DOCREV CUR MEDS BY ELIG CLIN: HCPCS | Performed by: INTERNAL MEDICINE

## 2022-03-02 NOTE — LETTER
3/2/2022    Patient: Andrés Jj   YOB: 1960   Date of Visit: 3/2/2022     Anahi Nascimento MD  [address] [place] [address]  Via Fax: [phone]    Dear Anahi Nascimento MD,      Thank you for referring Ms. Arabella Chun to [address] [place] [address] ENDOCRINOLOGY for evaluation. My notes for this consultation are attached. If you have questions, please do not hesitate to call me. I look forward to following your patient along with you.       Sincerely,    Rubi Gross MD

## 2022-03-02 NOTE — PROGRESS NOTES
1. Have you been to the ER, urgent care clinic since your last visit? Hospitalized since your last visit? No    2. Have you seen or consulted any other health care providers outside of the [address] [place] [address] since your last visit? Include any pap smears or colon screening.  No     Chief Complaint   Patient presents with    Diabetes       Visit Vitals  /84 (BP 1 Location: Right upper arm, BP Patient Position: Sitting, BP Cuff Size: Adult)   Pulse 95   Temp 97.8 °F (36.6 °C)   Resp 20   Ht 5' 4\" (1.626 m)   Wt 164 lb 12.8 oz (74.8 kg)   LMP  (LMP Unknown)   SpO2 100%   BMI 28.29 kg/m²

## 2022-03-02 NOTE — PROGRESS NOTES
History and Physical    Patient: Jairo Trevizo MRN: [id number]  SSN: [id number]    YOB: 1960  Age: 59 y.o. Sex: female      Subjective:      Jairo Trevizo is a 59 y.o. female with past medical history of hypertension, hyperlipidemia, obesity status post lap band in 2008 (lost 100 pounds) is here for follow-up of type 2 diabetes mellitus. She remains in primary care of Dr. Tariq Orozco. A few months back patient was diagnosed with type 1 diabetes mellitus (instead of type 2 diabetes mellitus). At the last visit we stopped Invokana and Januvia, continued metformin 500 mg 2 tablets twice a day, she continues to be on Levemir 15 units at bedtime, NovoLog she is taking 1 unit for 15 g of carbohydrates and correction factor I unit for 50 mg/dL blood glucose above 150 mg/dL. She is using freestyle arnulfo. This could not be downloaded in my office today because of technical issues. I reviewed freestyle arnulfo data on patient's phone. She is having hyperglycemia all day and all night. Rarely has hypoglycemia only when she takes NovoLog much before she starts to eat. She has gained 5 pounds since last visit 3 months back.     Glucometer reading: Reviewed    Updated diabetes history:  · Diagnosis: 5 years  · Current treatment: Metformin 500 mg 2 tabs bid, Invokana 300 mg daily, Januvia 100 mg daily, Levemir 15 units at bedtime, NovoLog 1 unit for 15 grams of carbs + ldcf  · Past treatment: Levemir (low blood glucose)  · Glucose checks: 1-2 times a day as above  · Hyperglycemia: rarely  · Hypoglycemia: yes  · Meals per day: 3, breakfast: egg/breakfast bar, lunch: squash and string beans, cabbage and chicken, dinner: chicken, shrimp, fish, snacks: bakes pretzels  · Exercise: sometimes walks when it is warm  · DM related hospitalizations: no  Complications of DM:  · CAD: no  · CVA: no  · PVD: no  · Amputations: no   · Retinopathy: no; last exam was 1-2021, next appointment is in May 2022  · Gastropathy: no  · Nephropathy: no  · Neuropathy: no  Medications:  · Statin: crestor 5 mg  · ACE-I: no  · ASA: no  · Diabetes education: no      Past Medical History:   Diagnosis Date    Arrhythmia     \"IRREG HEARTBEAT\"  (DR MARTINEZ)    Arthritis     Chronic pain     BACK    Depression     Diabetes ([facility] [address] [facility]     Dyspepsia and other specified disorders of function of stomach     GERD (gastroesophageal reflux disease)     Headache     Headache(784.0)     Hypertension     Hypertension 7/14/2020    Hyperthyroidism     Mixed hyperlipidemia 3/2/2022    Morbid obesity ([facility] [address] [facility]     Psychiatric disorder     DEPRESSION    Sleep apnea     no CPAP    Stool color black     Sun-damaged skin     Tanning bed exposure      Past Surgical History:   Procedure Laterality Date    HAND/FINGER SURGERY UNLISTED      x3    HX BACK SURGERY      x4- 2001,2003,2009,2011    HX CHOLECYSTECTOMY  2002    HX GASTRIC BYPASS  5/2008    LAP BAND ( [facility])    HX HEENT  2006    LASIK BILAT    HX HYSTERECTOMY  2005    HX ORTHOPAEDIC  2006,2010    HANDS  RIGHT X2; LEFT X1    HX TONSILLECTOMY  1964    HX TUBAL LIGATION  [id number] REV    x2 and a reversal    HX UROLOGICAL  04/29/2015    urethra sling    IR INJ SPINE FLUORO GUIDED  8/3/2020    IR INJ SPINE FLUORO GUIDED  10/5/2020    NE LAP, PLACE ADJUST GASTR BAND  5/2008      Family History   Problem Relation Age of Onset    Post-op Nausea/Vomiting Mother     Delayed Awakening Mother     Psychiatric Disorder Mother     Hypertension Mother     Stroke Mother     Headache Mother     Post-op Nausea/Vomiting Daughter     Delayed Awakening Daughter     Cancer Father     Hypertension Father     Cancer Maternal Grandmother     Psychiatric Disorder Maternal Grandmother     Dementia Paternal Grandmother     Dementia Paternal Grandfather     Parkinson's Disease Paternal Grandfather      Social History     Tobacco Use    Smoking status: Never Smoker    Smokeless tobacco: Never Used   Substance Use Topics    Alcohol use: No      Prior to Admission medications    Medication Sig Start Date End Date Taking? Authorizing Provider   insulin detemir U-100 (LEVEMIR FLEXTOUCH) 100 unit/mL (3 mL) inpn Inject 18 units at bedtime 3/2/22  Yes Temitope Melissa MD   metFORMIN (GLUCOPHAGE) 500 mg tablet TAKE 2 TABLETS TWICE DAILY 2/28/22  Yes Temitope Melissa MD   insulin aspart U-100 (NovoLOG Flexpen U-100 Insulin) 100 unit/mL (3 mL) inpn INJECT 1 UNIT FOR 15 GRAMS OF CARBS PLUS SCALE AS DIRECTED .  MAXIMUM DAILY DOSE 30 UNITS 1/10/22  Yes Temitope Melissa MD   rosuvastatin (CRESTOR) 5 mg tablet TAKE 1 TABLET EVERY NIGHT 12/22/21  Yes Temitope Melissa MD   flash glucose scanning reader (FreeStyle Edilberto 2 [place]) misc Check glucose 4 times a day 11/29/21  Yes Temitope Melissa MD   flash glucose sensor (FreeStyle Edilberto 2 Sensor) kit Check glucose 4 times a day 11/29/21  Yes Temitope Melissa MD   Insulin Needles, Disposable, 31 gauge x 5/16\" ndle 4 times a day 11/26/21  Yes Temitope Melissa MD   montelukast (SINGULAIR) 10 mg tablet TAKE 1 TABLET EVERY DAY 10/25/21  Yes Susan Iyer MD   insulin aspart U-100 (NOVOLOG) 100 unit/mL (3 mL) inpn Sliding scale before meals, maximum daily dose 40 units 10/15/21  Yes Temitope Melissa MD   alcohol swabs (BD Single Use Swabs Regular) padm USE THREE TIMES DAILY 9/27/21  Yes Temitope Melissa MD   lancets (Accu-Chek Softclix Lancets) misc TEST BLOOD SUGAR EVERY DAY 9/27/21  Yes Temitope Melissa MD   omeprazole (PRILOSEC) 20 mg capsule TAKE 1 CAPSULE EVERY DAY 8/4/21  Yes Re CUELLAR NP   glucose blood VI test strips (Accu-Chek Larissa Plus test strp) strip TEST BLOOD SUGAR TWICE DAILY AS DIRECTED 8/2/21  Yes Temitope Melissa MD   gabapentin (NEURONTIN) 300 mg capsule TAKE 1 CAPSULE THREE TIMES DAILY 6/14/21  Yes Desmond Amaro NP   meloxicam (MOBIC) 15 mg tablet TAKE 1 TABLET EVERY DAY 6/9/21  Yes Desmond Amaro NP   hydroCHLOROthiazide (HYDRODIURIL) 12.5 mg tablet TAKE 1 TABLET EVERY DAY 6/9/21  Yes Lisseth Jose NP   traMADoL (ULTRAM) 50 mg tablet TAKE 1 TABLET BY MOUTH EVERY 4 HOURS AS NEEDED FOR PAIN 5/10/21  Yes Provider, Historical   Insulin Needles, Disposable, (Droplet Pen Needle) 31 gauge x 5/16\" ndle Use to take insulin once a day, 90 days 5/21/21  Yes Grace Sevilla MD   glucose blood VI test strips (Accu-Chek Guide test strips) strip Once a day 5/21/21  Yes Grace Sevilla MD   Blood-Glucose Meter (Accu-Chek Guide Glucose Meter) misc Once a day 5/21/21  Yes Grace Sevilla MD   lidocaine (XYLOCAINE) 2 % jelly APPLY A THIN FILM TO AFFECTED AREA ONLY (LOW BACK AND NECK ONLY) ONE TIME DAILY AS NEEDED 2/18/21  Yes Lisseth Jose NP   LORazepam (ATIVAN) 0.5 mg tablet Take 1 Tab by mouth two (2) times daily as needed for Anxiety. Max Daily Amount: 1 mg. 12/2/20  Yes Zoë Salazar NP   mometasone (ELOCON) 0.1 % ointment APPLY  TO AFFECTED AREA TWO TIMES DAILY AS NEEDED FOR SKIN IRRITATION. 7/30/20  Yes Lisseth Jose NP   Biotin 2,500 mcg cap Take  by mouth. Yes Provider, Historical   cetirizine (ZYRTEC) 10 mg tablet TK 1 T PO D PRN 6/18/18  Yes Provider, Historical   naloxone 2 mg/actuation spry Use 1 spray intranasally into 1 nostril. Use a new Narcan nasal spray for subsequent doses and administer into alternating nostrils. May repeat every 2 to 3 minutes as needed. 3/8/18  Yes Bell Lemons NP   cholecalciferol, vitamin D3, 2,000 unit tab Take  by mouth. Yes Provider, Historical        No Known Allergies    Review of Systems:  ROS    A comprehensive review of systems was preformed and it is negative except mentioned in HPI    Objective:     Vitals:    03/02/22 1106   BP: 133/84   Pulse: 95   Resp: 20   Temp: 97.8 °F (36.6 °C)   SpO2: 100%   Weight: 164 lb 12.8 oz (74.8 kg)   Height: 5' 4\" (1.626 m)        Physical Exam:    Physical Exam  Vitals and nursing note reviewed. Constitutional:       Appearance: Normal appearance.    HENT:      Head: Normocephalic and atraumatic. Eyes:      Extraocular Movements: Extraocular movements intact. Cardiovascular:      Rate and Rhythm: Normal rate and regular rhythm. Pulmonary:      Effort: Pulmonary effort is normal.      Breath sounds: Normal breath sounds. Skin:     General: Skin is warm and dry. Neurological:      Mental Status: She is alert. Psychiatric:         Mood and Affect: Mood normal.         Behavior: Behavior normal.         Thought Content: Thought content normal.         Judgment: Judgment normal.        diabetic foot exam:  Bilateral diabetic foot exam was performed today. Dorsalis pedis pulses 2+ bilaterally. Monofilament sensation normal bilaterally. No ulcers or skin breakdown. Labs and Imaging:  Results for Diann King (MRN [id number]) as of 5/21/2021 12:05   Ref. Range 2/24/2021 10:04   Sodium Latest Ref Range: 134 - 144 mmol/L 143   Potassium Latest Ref Range: 3.5 - 5.2 mmol/L 4.7   Chloride Latest Ref Range: 96 - 106 mmol/L 100   CO2 Latest Ref Range: 20 - 29 mmol/L 28   Glucose Latest Ref Range: 65 - 99 mg/dL 101 (H)   BUN Latest Ref Range: 8 - 27 mg/dL 14   Creatinine Latest Ref Range: 0.57 - 1.00 mg/dL 1.00   BUN/Creatinine ratio Latest Ref Range: 12 - 28  14   Calcium Latest Ref Range: 8.7 - 10.3 mg/dL 10.1   GFR est non-AA Latest Ref Range: >59 mL/min/1.73 61   GFR est AA Latest Ref Range: >59 mL/min/1.73 70   Bilirubin, total Latest Ref Range: 0.0 - 1.2 mg/dL 1.5 (H)   Protein, total Latest Ref Range: 6.0 - 8.5 g/dL 7.3   Albumin Latest Ref Range: 3.8 - 4.8 g/dL 4.7   A-G Ratio Latest Ref Range: 1.2 - 2.2  1.8   ALT Latest Ref Range: 0 - 32 IU/L 13   AST Latest Ref Range: 0 - 40 IU/L 15   Alk. phosphatase Latest Ref Range: 39 - 117 IU/L 87   Results for XIOMARA MALCOLM (MRN [id number]) as of 5/21/2021 12:05   Ref.  Range 2/24/2021 10:04   Triglyceride Latest Ref Range: 0 - 149 mg/dL 175 (H)   Cholesterol, total Latest Ref Range: 100 - 199 mg/dL 151   HDL Cholesterol Latest Ref Range: >39 mg/dL 62   VLDL, calculated Latest Ref Range: 5 - 40 mg/dL 29   LDL, calculated Latest Ref Range: 0 - 99 mg/dL 60   Results for XIOMARA MALCOLM (MRN [id number]) as of 5/21/2021 12:05   Ref. Range 2/24/2021 10:04   Creatinine, urine Latest Ref Range: Not Estab. mg/dL 117.2   Microalbumin, urine Latest Ref Range: Not Estab. ug/mL 7.4   Microalbumin/Creat. Ratio Latest Ref Range: 0 - 29 mg/g creat 6   TSH Latest Ref Range: 0.450 - 4.500 uIU/mL 2.320     Last 3 Recorded Weights in this Encounter    03/02/22 1106   Weight: 164 lb 12.8 oz (74.8 kg)        Lab Results   Component Value Date/Time    Hemoglobin A1c 6.1 (H) 09/16/2019 02:28 PM    Hemoglobin A1c 7.3 (H) 12/14/2018 11:12 AM    Hemoglobin A1c 7.0 (H) 09/17/2018 10:45 AM    Hemoglobin A1c, External 6.3 01/24/2020 12:00 AM        Assessment:     Patient Active Problem List   Diagnosis Code    Lumbar stenosis M48.061    GERD (gastroesophageal reflux disease) K21.9    Dysphagia R13.10    Status post gastric banding Z98.84    DDD (degenerative disc disease), lumbar M51.36    Depression F32. A    Diverticulosis K57.90    NICOLE (obstructive sleep apnea) G47.33    Diabetes mellitus ([facility] [address] E11.9    Urinary frequency R35.0    Advanced care planning/counseling discussion Z71.89    Type 2 diabetes mellitus with diabetic neuropathy ([facility] [address] E11.40    Mixed hyperlipidemia E78.2    Benign essential HTN I10           Plan:     BEULAH/ type 1 diabetes mellitus uncontrolled   Hemoglobin A1c was 7.3% on 12-, 6.8% on 4-8-2019, 6.8% in 7-, 6.3% on 1-, 7.9% on 7-, 6.4% on 11-, 7.4% on 5-, 7.4% on 8-, 9.4% on 11-, 8.1% today. Fingerstick blood glucose is 310 mg/dL in my office today. Urine is showing moderate ketones.   Up to date with diabetes related annual labs: yes 2-  Up to date with diabetic eye exam: yes February 2021  10-: Glucose 144, C-peptide low at 0.6, margaret 65 antibody very high at 7402, IA 2 antibody elevated at 87  plan: For now continue metformin 500 mg 2 tablets twice a day. Increase Levemir to 18 units at bedtime. For now continue NovoLog 1 unit for 15 g of carbohydrate and low-dose correction factor. Continue using freestyle arnulfo. Advised patient to take NovoLog less than 15 minutes before she starts to eat, to avoid hypoglycemia. I will see her back in 6 weeks. Eventually I will transition patient to insulin pump, as she is interested. Labs prior to next visit.    essential hypertension   blood pressure well-controlled on current medications. No microalbuminuria, last checked in February 2021. mixed hyperlipidemia   LDL was adequate at 36 in 1/2020, 60 in February 2021. Continue Crestor 5 mg daily. Check lipid profile fasting prior to next visit.     Orders Placed This Encounter    LIPID PANEL     Standing Status:   Future     Standing Expiration Date:   9/3/1291    METABOLIC PANEL, COMPREHENSIVE     Standing Status:   Future     Standing Expiration Date:   9/2/2022    MICROALBUMIN, UR, RAND W/ MICROALB/CREAT RATIO     Standing Status:   Future     Standing Expiration Date:   9/2/2022    TSH RFX ON ABNORMAL TO FREE T4     Standing Status:   Future     Standing Expiration Date:   9/2/2022    AMB POC GLUCOSE BLOOD, BY GLUCOSE MONITORING DEVICE    AMB POC HEMOGLOBIN A1C    AMB POC URINALYSIS DIP STICK AUTO W/O MICRO    insulin detemir U-100 (LEVEMIR FLEXTOUCH) 100 unit/mL (3 mL) inpn     Sig: Inject 18 units at bedtime     Dispense:  6 Adjustable Dose Pre-filled Pen Syringe     Refill:  2        Signed By: Wilman Beckwith MD     March 2, 2022      Return to clinic 6 weeks

## 2022-03-15 LAB
ALBUMIN SERPL-MCNC: 4.2 G/DL (ref 3.8–4.8)
ALBUMIN/CREAT UR: 6 MG/G CREAT (ref 0–29)
ALBUMIN/GLOB SERPL: 1.8 {RATIO} (ref 1.2–2.2)
ALP SERPL-CCNC: 86 IU/L (ref 44–121)
ALT SERPL-CCNC: 12 IU/L (ref 0–32)
AST SERPL-CCNC: 14 IU/L (ref 0–40)
BILIRUB SERPL-MCNC: 0.9 MG/DL (ref 0–1.2)
BUN SERPL-MCNC: 13 MG/DL (ref 8–27)
BUN/CREAT SERPL: 14 (ref 12–28)
CALCIUM SERPL-MCNC: 9.1 MG/DL (ref 8.7–10.3)
CHLORIDE SERPL-SCNC: 101 MMOL/L (ref 96–106)
CHOLEST SERPL-MCNC: 149 MG/DL (ref 100–199)
CO2 SERPL-SCNC: 25 MMOL/L (ref 20–29)
CREAT SERPL-MCNC: 0.9 MG/DL (ref 0.57–1)
CREAT UR-MCNC: 180.4 MG/DL
EGFR: 72 ML/MIN/1.73
GLOBULIN SER CALC-MCNC: 2.3 G/DL (ref 1.5–4.5)
GLUCOSE SERPL-MCNC: 230 MG/DL (ref 65–99)
HDLC SERPL-MCNC: 62 MG/DL
LDLC SERPL CALC-MCNC: 69 MG/DL (ref 0–99)
MICROALBUMIN UR-MCNC: 10.7 UG/ML
POTASSIUM SERPL-SCNC: 4.8 MMOL/L (ref 3.5–5.2)
PROT SERPL-MCNC: 6.5 G/DL (ref 6–8.5)
SODIUM SERPL-SCNC: 141 MMOL/L (ref 134–144)
TRIGL SERPL-MCNC: 95 MG/DL (ref 0–149)
TSH SERPL DL<=0.005 MIU/L-ACNC: 2.3 UIU/ML (ref 0.45–4.5)
VLDLC SERPL CALC-MCNC: 18 MG/DL (ref 5–40)

## 2022-03-19 PROBLEM — I10 BENIGN ESSENTIAL HTN: Status: ACTIVE | Noted: 2022-03-02

## 2022-03-19 PROBLEM — E11.40 TYPE 2 DIABETES MELLITUS WITH DIABETIC NEUROPATHY (HCC): Status: ACTIVE | Noted: 2018-12-14

## 2022-03-20 PROBLEM — E78.2 MIXED HYPERLIPIDEMIA: Status: ACTIVE | Noted: 2022-03-02

## 2022-04-13 ENCOUNTER — OFFICE VISIT (OUTPATIENT)
Dept: ENDOCRINOLOGY | Age: 62
End: 2022-04-13
Payer: MEDICARE

## 2022-04-13 VITALS
HEIGHT: 64 IN | HEART RATE: 82 BPM | BODY MASS INDEX: 28.85 KG/M2 | DIASTOLIC BLOOD PRESSURE: 94 MMHG | WEIGHT: 169 LBS | OXYGEN SATURATION: 98 % | RESPIRATION RATE: 16 BRPM | SYSTOLIC BLOOD PRESSURE: 150 MMHG

## 2022-04-13 DIAGNOSIS — E10.69 TYPE 1 DIABETES MELLITUS WITH OTHER SPECIFIED COMPLICATION (HCC): Primary | ICD-10-CM

## 2022-04-13 DIAGNOSIS — I10 BENIGN ESSENTIAL HTN: ICD-10-CM

## 2022-04-13 DIAGNOSIS — E78.2 MIXED HYPERLIPIDEMIA: ICD-10-CM

## 2022-04-13 PROCEDURE — G8753 SYS BP > OR = 140: HCPCS | Performed by: INTERNAL MEDICINE

## 2022-04-13 PROCEDURE — 99214 OFFICE O/P EST MOD 30 MIN: CPT | Performed by: INTERNAL MEDICINE

## 2022-04-13 PROCEDURE — 3052F HG A1C>EQUAL 8.0%<EQUAL 9.0%: CPT | Performed by: INTERNAL MEDICINE

## 2022-04-13 PROCEDURE — 3017F COLORECTAL CA SCREEN DOC REV: CPT | Performed by: INTERNAL MEDICINE

## 2022-04-13 PROCEDURE — G8419 CALC BMI OUT NRM PARAM NOF/U: HCPCS | Performed by: INTERNAL MEDICINE

## 2022-04-13 PROCEDURE — G9717 DOC PT DX DEP/BP F/U NT REQ: HCPCS | Performed by: INTERNAL MEDICINE

## 2022-04-13 PROCEDURE — G8427 DOCREV CUR MEDS BY ELIG CLIN: HCPCS | Performed by: INTERNAL MEDICINE

## 2022-04-13 PROCEDURE — 2022F DILAT RTA XM EVC RTNOPTHY: CPT | Performed by: INTERNAL MEDICINE

## 2022-04-13 PROCEDURE — G8755 DIAS BP > OR = 90: HCPCS | Performed by: INTERNAL MEDICINE

## 2022-04-13 RX ORDER — ROSUVASTATIN CALCIUM 5 MG/1
5 TABLET, COATED ORAL
Qty: 90 TABLET | Refills: 2 | Status: SHIPPED | OUTPATIENT
Start: 2022-04-13 | End: 2022-07-12 | Stop reason: SDUPTHER

## 2022-04-13 RX ORDER — INSULIN ASPART 100 [IU]/ML
INJECTION, SOLUTION INTRAVENOUS; SUBCUTANEOUS
Qty: 45 ML | Refills: 2 | Status: SHIPPED | OUTPATIENT
Start: 2022-04-13 | End: 2022-05-31 | Stop reason: SDUPTHER

## 2022-04-13 NOTE — LETTER
4/13/2022    Patient: Stanislaw Dyer   YOB: 1960   Date of Visit: 4/13/2022     Marcy Ahmadi MD  [address] [place] [address]  Via Fax: [phone]    Dear Marcy Ahmadi MD,      Thank you for referring Ms. Marlon Keyes to [address] [place] [address] for evaluation. My notes for this consultation are attached. If you have questions, please do not hesitate to call me. I look forward to following your patient along with you.       Sincerely,    Albino Rodriguez MD

## 2022-04-13 NOTE — PROGRESS NOTES
History and Physical    Patient: Christian Barr MRN: [id number]  SSN: [id number]    YOB: 1960  Age: 59 y.o. Sex: female      Subjective:      Christian Barr is a 59 y.o. female with past medical history of hypertension, hyperlipidemia, obesity status post lap band in 2008 (lost 100 pounds) is here for follow-up of type 2 diabetes mellitus. She remains in primary care of Dr. Farshad Bey. A few months back patient was diagnosed with type 1 diabetes mellitus (instead of type 2 diabetes mellitus). She continues to be on metformin 500 mg 2 tablets twice a day, Levemir was increased to 18 units at bedtime, NovoLog she is taking 1 unit for 15 g of carbohydrates and correction factor I unit for 50 mg/dL blood glucose above 150 mg/dL. She is using freestyle arnulfo. This could not be downloaded in my office today because of technical issues. I reviewed freestyle arnulfo data on patient's phone. She is having hyperglycemia all day and all night. She had steroid injection in her knee last week and her blood glucose really worsened after that, it is starting to come down just today but it is still in 200s. She has appointment for diabetic eye exam in May 2022. She is having recurrent UTI although she is not on Invokana anymore.     Glucometer reading: Reviewed    Updated diabetes history:  · Diagnosis: 5 years  · Current treatment: Metformin 500 mg 2 tabs bid, Levemir 18 units at bedtime, NovoLog 1 unit for 15 grams of carbs + ldcf  · Past treatment: Januvia, Invokana  · Glucose checks: 1-2 times a day as above  · Hyperglycemia: rarely  · Hypoglycemia: yes  · Meals per day: 3, breakfast: egg/breakfast bar, lunch: squash and string beans, cabbage and chicken, dinner: chicken, shrimp, fish, snacks: bakes pretzels  · Exercise: sometimes walks when it is warm  · DM related hospitalizations: no  Complications of DM:  · CAD: no  · CVA: no  · PVD: no  · Amputations: no   · Retinopathy: no; last exam was 1-2021, next appointment is in May 2022  · Gastropathy: no  · Nephropathy: no  · Neuropathy: no  Medications:  · Statin: crestor 5 mg  · ACE-I: no  · ASA: no  · Diabetes education: no      Past Medical History:   Diagnosis Date    Arrhythmia     \"IRREG HEARTBEAT\"  ( [facility])    Arthritis     Chronic pain     BACK    Depression     Diabetes [facility] [address]     Dyspepsia and other specified disorders of function of stomach     GERD (gastroesophageal reflux disease)     Headache     Headache(784.0)     Hypertension     Hypertension 7/14/2020    Hyperthyroidism     Mixed hyperlipidemia 3/2/2022    Morbid obesity ([facility] [address]     Psychiatric disorder     DEPRESSION    Sleep apnea     no CPAP    Stool color black     Sun-damaged skin     Tanning bed exposure      Past Surgical History:   Procedure Laterality Date    HAND/FINGER SURGERY UNLISTED      x3    HX BACK SURGERY      x4- 2001,2003,2009,2011    HX CHOLECYSTECTOMY  2002    HX GASTRIC BYPASS  5/2008    LAP BAND ( RESHMA RODRIGUEZ)    HX HEENT  2006    LASIK BILAT    HX HYSTERECTOMY  2005    HX ORTHOPAEDIC  2006,2010    HANDS  RIGHT X2; LEFT X1    HX TONSILLECTOMY  1964    HX TUBAL LIGATION  [id number] REV    x2 and a reversal    HX UROLOGICAL  04/29/2015    urethra sling    IR INJ SPINE FLUORO GUIDED  8/3/2020    IR INJ SPINE FLUORO GUIDED  10/5/2020    MI LAP, PLACE ADJUST GASTR BAND  5/2008      Family History   Problem Relation Age of Onset    Post-op Nausea/Vomiting Mother     Delayed Awakening Mother     Psychiatric Disorder Mother     Hypertension Mother     Stroke Mother     Headache Mother     Post-op Nausea/Vomiting Daughter     Delayed Awakening Daughter     Cancer Father     Hypertension Father     Cancer Maternal Grandmother     Psychiatric Disorder Maternal Grandmother     Dementia Paternal Grandmother     Dementia Paternal Grandfather     Parkinson's Disease Paternal Grandfather      Social History     Tobacco Use    Smoking status: Never Smoker    Smokeless tobacco: Never Used   Substance Use Topics    Alcohol use: No      Prior to Admission medications    Medication Sig Start Date End Date Taking? Authorizing Provider   insulin aspart U-100 (NovoLOG Flexpen U-100 Insulin) 100 unit/mL (3 mL) inpn INJECT 1 UNIT FOR 10 GRAMS OF CARBS PLUS SCALE AS DIRECTED . MAXIMUM DAILY DOSE 50 UNITS, 90 days 4/13/22  Yes Rachel Guevara MD   insulin detemir U-100 (LEVEMIR FLEXTOUCH) 100 unit/mL (3 mL) inpn Inject 22 units at bedtime, 90 days 4/13/22  Yes Rachel Guevara MD   rosuvastatin (CRESTOR) 5 mg tablet Take 1 Tablet by mouth nightly for 90 days. 4/13/22 7/12/22 Yes Rachel Guevara MD   insulin detemir U-100 (LEVEMIR FLEXTOUCH) 100 unit/mL (3 mL) inpn Inject 18 units at bedtime 3/2/22  Yes Rachel Guevara MD   metFORMIN (GLUCOPHAGE) 500 mg tablet TAKE 2 TABLETS TWICE DAILY 2/28/22  Yes Rachel Guevara MD   montelukast (SINGULAIR) 10 mg tablet TAKE 1 TABLET EVERY DAY 10/25/21  Yes Dahlia Archer MD   insulin aspart U-100 (NOVOLOG) 100 unit/mL (3 mL) inpn Sliding scale before meals, maximum daily dose 40 units 10/15/21  Yes Rachel Guevara MD   omeprazole (PRILOSEC) 20 mg capsule TAKE 1 CAPSULE EVERY DAY 8/4/21  Yes Kris Canada NP   gabapentin (NEURONTIN) 300 mg capsule TAKE 1 CAPSULE THREE TIMES DAILY 6/14/21  Yes Kris Canada NP   meloxicam (MOBIC) 15 mg tablet TAKE 1 TABLET EVERY DAY 6/9/21  Yes Kris Canada NP   hydroCHLOROthiazide (HYDRODIURIL) 12.5 mg tablet TAKE 1 TABLET EVERY DAY 6/9/21  Yes Kris Canada NP   traMADoL (ULTRAM) 50 mg tablet TAKE 1 TABLET BY MOUTH EVERY 4 HOURS AS NEEDED FOR PAIN 5/10/21  Yes Provider, Migdalia   lidocaine (XYLOCAINE) 2 % jelly APPLY A THIN FILM TO AFFECTED AREA ONLY (LOW BACK AND NECK ONLY) ONE TIME DAILY AS NEEDED 2/18/21  Yes Kris Canada NP   LORazepam (ATIVAN) 0.5 mg tablet Take 1 Tab by mouth two (2) times daily as needed for Anxiety.  Max Daily Amount: 1 mg. 12/2/20 Yes Zoë March, NP   mometasone (ELOCON) 0.1 % ointment APPLY  TO AFFECTED AREA TWO TIMES DAILY AS NEEDED FOR SKIN IRRITATION. 7/30/20  Yes Kris Canada NP   Biotin 2,500 mcg cap Take  by mouth. Yes Provider, Historical   cetirizine (ZYRTEC) 10 mg tablet TK 1 T PO D PRN 6/18/18  Yes Provider, Historical   naloxone 2 mg/actuation spry Use 1 spray intranasally into 1 nostril. Use a new Narcan nasal spray for subsequent doses and administer into alternating nostrils. May repeat every 2 to 3 minutes as needed. 3/8/18  Yes Valeria Comer NP   cholecalciferol, vitamin D3, 2,000 unit tab Take  by mouth.    Yes Provider, Historical   flash glucose scanning reader (FreeStyle Edilberto 2 [place]) misc Check glucose 4 times a day 11/29/21   Rachel Guevara MD   flash glucose sensor (FreeStyle Edilberto 2 Sensor) kit Check glucose 4 times a day 11/29/21   Rachel Guevara MD   Insulin Needles, Disposable, 31 gauge x 5/16\" ndle 4 times a day 11/26/21   Rachel Guevara MD   alcohol swabs (BD Single Use Swabs Regular) padm USE THREE TIMES DAILY 9/27/21   Rachel Guevara MD   lancets (Accu-Chek Softclix Lancets) misc TEST BLOOD SUGAR EVERY DAY 9/27/21   Rachel Guevara MD   glucose blood VI test strips (Accu-Chek Larissa Plus test strp) strip TEST BLOOD SUGAR TWICE DAILY AS DIRECTED 8/2/21   Rachel Guevara MD   Insulin Needles, Disposable, (Droplet Pen Needle) 31 gauge x 5/16\" ndle Use to take insulin once a day, 90 days 5/21/21   Rachel Guevara MD   glucose blood VI test strips (Accu-Chek Guide test strips) strip Once a day 5/21/21   Rachel Guevara MD   Blood-Glucose Meter (Accu-Chek Guide Glucose Meter) misc Once a day 5/21/21   Rachel Guevara MD        No Known Allergies    Review of Systems:  ROS    A comprehensive review of systems was preformed and it is negative except mentioned in HPI    Objective:     Vitals:    04/13/22 1411   BP: (!) 150/94   Pulse: 82   Resp: 16   SpO2: 98%   Weight: 169 lb (76.7 kg)   Height: 5' 4\" (1.626 m) Physical Exam:    Physical Exam  Vitals and nursing note reviewed. Constitutional:       Appearance: Normal appearance. HENT:      Head: Normocephalic and atraumatic. Cardiovascular:      Rate and Rhythm: Normal rate and regular rhythm. Pulmonary:      Effort: Pulmonary effort is normal.      Breath sounds: Normal breath sounds. Neurological:      Mental Status: She is alert. 3-2-2022:   diabetic foot exam:  Bilateral diabetic foot exam was performed today. Dorsalis pedis pulses 2+ bilaterally. Monofilament sensation normal bilaterally. No ulcers or skin breakdown. Labs and Imaging:  Results for Dora Orozco (MRN [id number]) as of 5/21/2021 12:05   Ref. Range 2/24/2021 10:04   Sodium Latest Ref Range: 134 - 144 mmol/L 143   Potassium Latest Ref Range: 3.5 - 5.2 mmol/L 4.7   Chloride Latest Ref Range: 96 - 106 mmol/L 100   CO2 Latest Ref Range: 20 - 29 mmol/L 28   Glucose Latest Ref Range: 65 - 99 mg/dL 101 (H)   BUN Latest Ref Range: 8 - 27 mg/dL 14   Creatinine Latest Ref Range: 0.57 - 1.00 mg/dL 1.00   BUN/Creatinine ratio Latest Ref Range: 12 - 28  14   Calcium Latest Ref Range: 8.7 - 10.3 mg/dL 10.1   GFR est non-AA Latest Ref Range: >59 mL/min/1.73 61   GFR est AA Latest Ref Range: >59 mL/min/1.73 70   Bilirubin, total Latest Ref Range: 0.0 - 1.2 mg/dL 1.5 (H)   Protein, total Latest Ref Range: 6.0 - 8.5 g/dL 7.3   Albumin Latest Ref Range: 3.8 - 4.8 g/dL 4.7   A-G Ratio Latest Ref Range: 1.2 - 2.2  1.8   ALT Latest Ref Range: 0 - 32 IU/L 13   AST Latest Ref Range: 0 - 40 IU/L 15   Alk. phosphatase Latest Ref Range: 39 - 117 IU/L 87   Results for XIOMARA MALCOLM (MRN [id number]) as of 5/21/2021 12:05   Ref.  Range 2/24/2021 10:04   Triglyceride Latest Ref Range: 0 - 149 mg/dL 175 (H)   Cholesterol, total Latest Ref Range: 100 - 199 mg/dL 151   HDL Cholesterol Latest Ref Range: >39 mg/dL 62   VLDL, calculated Latest Ref Range: 5 - 40 mg/dL 29   LDL, calculated Latest Ref Range: 0 - 99 mg/dL 60   Results for XIOMARA MALCOLM (MRN [id number]) as of 5/21/2021 12:05   Ref. Range 2/24/2021 10:04   Creatinine, urine Latest Ref Range: Not Estab. mg/dL 117.2   Microalbumin, urine Latest Ref Range: Not Estab. ug/mL 7.4   Microalbumin/Creat. Ratio Latest Ref Range: 0 - 29 mg/g creat 6   TSH Latest Ref Range: 0.450 - 4.500 uIU/mL 2.320     Last 3 Recorded Weights in this Encounter    04/13/22 1411   Weight: 169 lb (76.7 kg)        Lab Results   Component Value Date/Time    Hemoglobin A1c 6.1 (H) 09/16/2019 02:28 PM    Hemoglobin A1c 7.3 (H) 12/14/2018 11:12 AM    Hemoglobin A1c 7.0 (H) 09/17/2018 10:45 AM    Hemoglobin A1c, External 6.3 01/24/2020 12:00 AM        Assessment:     Patient Active Problem List   Diagnosis Code    Lumbar stenosis M48.061    GERD (gastroesophageal reflux disease) K21.9    Dysphagia R13.10    Status post gastric banding Z98.84    DDD (degenerative disc disease), lumbar M51.36    Depression F32. A    Diverticulosis K57.90    NICOLE (obstructive sleep apnea) G47.33    Diabetes mellitus ([facility] [address] E11.9    Urinary frequency R35.0    Advanced care planning/counseling discussion Z71.89    Type 2 diabetes mellitus with diabetic neuropathy ([facility] [address] E11.40    Mixed hyperlipidemia E78.2    Benign essential HTN I10           Plan:     BEULAH/ type 1 diabetes mellitus uncontrolled   Hemoglobin A1c was 7.3% on 12-, 6.8% on 4-8-2019, 6.8% in 7-, 6.3% on 1-, 7.9% on 7-, 6.4% on 11-, 7.4% on 5-, 7.4% on 8-, 9.4% on 11-, 8.1% on 3-2-2022. Up to date with diabetes related annual labs: yes 3-  Up to date with diabetic eye exam: yes February 2021  10-: Glucose 144, C-peptide low at 0.6, margaret 65 antibody very high at 7402, IA 2 antibody elevated at 87  plan: For now continue metformin 500 mg 2 tablets twice a day. Increase Levemir to 22 units at bedtime.   Increase NovoLog 1 unit for 10 g of carbohydrate and low-dose correction factor. Continue using freestyle arnulfo. Okay to use extra sliding scale if blood glucose is high after 3 hours of taking NovoLog for any reason. Eventually I will transition patient to insulin pump, as she is interested. essential hypertension   blood pressure well-controlled on current medications. No microalbuminuria, last checked in March 2022    mixed hyperlipidemia   LDL was adequate at 36 in 1/2020, 60 in February 2021, 3-: Total cholesterol 149, triglycerides 95, LDL 69. Continue Crestor 5 mg daily at bedtime. Orders Placed This Encounter    insulin aspart U-100 (NovoLOG Flexpen U-100 Insulin) 100 unit/mL (3 mL) inpn     Sig: INJECT 1 UNIT FOR 10 GRAMS OF CARBS PLUS SCALE AS DIRECTED . MAXIMUM DAILY DOSE 50 UNITS, 90 days     Dispense:  45 mL     Refill:  2    insulin detemir U-100 (LEVEMIR FLEXTOUCH) 100 unit/mL (3 mL) inpn     Sig: Inject 22 units at bedtime, 90 days     Dispense:  27 mL     Refill:  2    rosuvastatin (CRESTOR) 5 mg tablet     Sig: Take 1 Tablet by mouth nightly for 90 days.      Dispense:  90 Tablet     Refill:  2        Signed By: Ambrosio Morales MD     April 13, 2022      Return to clinic 6 weeks

## 2022-04-13 NOTE — PATIENT INSTRUCTIONS
Levemir 22 units daily    Novolog 1 unit for 10 grams of carbs + scale    Low dose correction Scale   Dosing Scale:  150-200 mg/dL add 1 unit insulin  201-250 mg/dL add 2 units insulin  251-300 mg/dL add 3 units insulin  301-350 mg/dL add 4 units insulin  351-400 mg/dL add 5 units insulin  401-450 mg/dL add 6 units insulin  >450 mg/dL add 7 units insulin

## 2022-04-13 NOTE — PROGRESS NOTES
Chief Complaint   Patient presents with    Diabetes     6 wks fu            Visit Vitals  BP (!) 150/94 (BP 1 Location: Left upper arm, BP Patient Position: Sitting)   Pulse 82   Resp 16   Ht 5' 4\" (1.626 m)   Wt 169 lb (76.7 kg)   LMP  (LMP Unknown)   SpO2 98%   BMI 29.01 kg/m²           1. Have you been to the ER, urgent care clinic since your last visit? Hospitalized since your last visit? No    2. Have you seen or consulted any other health care providers outside of the [address] [place] [address] since your last visit? Include any pap smears or colon screening.  No

## 2022-05-31 ENCOUNTER — TELEPHONE (OUTPATIENT)
Dept: ENDOCRINOLOGY | Age: 62
End: 2022-05-31

## 2022-05-31 ENCOUNTER — OFFICE VISIT (OUTPATIENT)
Dept: ENDOCRINOLOGY | Age: 62
End: 2022-05-31
Payer: MEDICARE

## 2022-05-31 VITALS
HEIGHT: 64 IN | HEART RATE: 91 BPM | TEMPERATURE: 97.8 F | WEIGHT: 170.7 LBS | OXYGEN SATURATION: 94 % | SYSTOLIC BLOOD PRESSURE: 135 MMHG | BODY MASS INDEX: 29.14 KG/M2 | DIASTOLIC BLOOD PRESSURE: 89 MMHG

## 2022-05-31 DIAGNOSIS — G47.39 OTHER SLEEP APNEA: ICD-10-CM

## 2022-05-31 DIAGNOSIS — I10 BENIGN ESSENTIAL HTN: ICD-10-CM

## 2022-05-31 DIAGNOSIS — E10.69 TYPE 1 DIABETES MELLITUS WITH OTHER SPECIFIED COMPLICATION (HCC): Primary | ICD-10-CM

## 2022-05-31 DIAGNOSIS — E78.2 MIXED HYPERLIPIDEMIA: ICD-10-CM

## 2022-05-31 LAB
GLUCOSE POC: 134 MG/DL
HBA1C MFR BLD HPLC: 8.6 %

## 2022-05-31 PROCEDURE — 99214 OFFICE O/P EST MOD 30 MIN: CPT | Performed by: INTERNAL MEDICINE

## 2022-05-31 PROCEDURE — 83036 HEMOGLOBIN GLYCOSYLATED A1C: CPT | Performed by: INTERNAL MEDICINE

## 2022-05-31 PROCEDURE — 95251 CONT GLUC MNTR ANALYSIS I&R: CPT | Performed by: INTERNAL MEDICINE

## 2022-05-31 PROCEDURE — G8754 DIAS BP LESS 90: HCPCS | Performed by: INTERNAL MEDICINE

## 2022-05-31 PROCEDURE — G9717 DOC PT DX DEP/BP F/U NT REQ: HCPCS | Performed by: INTERNAL MEDICINE

## 2022-05-31 PROCEDURE — G8419 CALC BMI OUT NRM PARAM NOF/U: HCPCS | Performed by: INTERNAL MEDICINE

## 2022-05-31 PROCEDURE — G8752 SYS BP LESS 140: HCPCS | Performed by: INTERNAL MEDICINE

## 2022-05-31 PROCEDURE — 3017F COLORECTAL CA SCREEN DOC REV: CPT | Performed by: INTERNAL MEDICINE

## 2022-05-31 PROCEDURE — G8427 DOCREV CUR MEDS BY ELIG CLIN: HCPCS | Performed by: INTERNAL MEDICINE

## 2022-05-31 PROCEDURE — 3052F HG A1C>EQUAL 8.0%<EQUAL 9.0%: CPT | Performed by: INTERNAL MEDICINE

## 2022-05-31 PROCEDURE — 82962 GLUCOSE BLOOD TEST: CPT | Performed by: INTERNAL MEDICINE

## 2022-05-31 PROCEDURE — 2022F DILAT RTA XM EVC RTNOPTHY: CPT | Performed by: INTERNAL MEDICINE

## 2022-05-31 RX ORDER — INSULIN ASPART 100 [IU]/ML
INJECTION, SOLUTION INTRAVENOUS; SUBCUTANEOUS
Qty: 45 ML | Refills: 2 | Status: SHIPPED | OUTPATIENT
Start: 2022-05-31 | End: 2022-07-12

## 2022-05-31 RX ORDER — METFORMIN HYDROCHLORIDE 500 MG/1
TABLET ORAL
Qty: 360 TABLET | Refills: 1 | Status: SHIPPED | OUTPATIENT
Start: 2022-05-31 | End: 2022-07-12

## 2022-05-31 RX ORDER — PEN NEEDLE, DIABETIC 30 GX3/16"
NEEDLE, DISPOSABLE MISCELLANEOUS
Qty: 200 EACH | Refills: 3 | Status: SHIPPED | OUTPATIENT
Start: 2022-05-31

## 2022-05-31 RX ORDER — ESTRADIOL 0.03 MG/D
PATCH TRANSDERMAL
COMMUNITY
Start: 2022-05-17

## 2022-05-31 NOTE — PROGRESS NOTES
History and Physical    Patient: Rachel Key MRN: [id number]  SSN: [id number]    YOB: 1960  Age: 59 y.o. Sex: female      Subjective:      Rachel Key is a 59 y.o. female with past medical history of hypertension, hyperlipidemia, obesity status post lap band in 2008 (lost 100 pounds) is here for follow-up of type 2 diabetes mellitus. She remains in primary care of Dr. Suri Peguero. A few months back patient was diagnosed with type 1 diabetes mellitus (instead of type 2 diabetes mellitus). She continues to be on metformin 500 mg 2 tablets twice a day, we have been slowly going up on her insulin, currently she is on Levemir 30 units at bedtime, NovoLog 1 unit for 5 g of carbohydrate and correction factor I unit for 50 mg/dL blood glucose above 150 mg/dL. Patient is using freestyle arnulfo. She is recently having low blood glucose early in the morning. Regarding daytime readings, she has frequent postprandial hyperglycemia but also she sometimes forgets to bolus until she has already started eating. Using WaveMAX elodia for carb counting. Avoiding snacks in between meals. Interested in insulin pump. She has sleep apnea, does not tolerate CPAP very well. She has questions about inspire device. Does not have a sleep medicine specialist.    Carley Gu reading: [facility] was downloaded and reviewed with patient.   Please see procedure note below    Updated diabetes history:  · Diagnosis: 5 years  · Current treatment: Metformin 500 mg 2 tabs bid, Levemir 30 units at bedtime, NovoLog 1 unit for 5 grams of carbs + ldcf  · Past treatment: Januvia, Invokana  · Glucose checks: 1-2 times a day as above  · Hyperglycemia: rarely  · Hypoglycemia: yes  · Meals per day: 3, breakfast: egg/breakfast bar, lunch: squash and string beans, cabbage and chicken, dinner: chicken, shrimp, fish, snacks: bakes pretzels  · Exercise: sometimes walks when it is warm  · DM related hospitalizations: no  Complications of DM:  · CAD: no  · CVA: no  · PVD: no  · Amputations: no   · Retinopathy: no; last exam was May 2022  · Gastropathy: no  · Nephropathy: no  · Neuropathy: no  Medications:  · Statin: crestor 5 mg  · ACE-I: no  · ASA: no  · Diabetes education: no      Past Medical History:   Diagnosis Date    Arrhythmia     \"IRREG HEARTBEAT\"  (DR [facility])    Arthritis     Chronic pain     BACK    Depression     Diabetes ([address]     Dyspepsia and other specified disorders of function of stomach     GERD (gastroesophageal reflux disease)     Headache     Headache(784.0)     Hypertension     Hypertension 7/14/2020    Hyperthyroidism     Mixed hyperlipidemia 3/2/2022    Morbid obesity ([address]     Psychiatric disorder     DEPRESSION    Sleep apnea     no CPAP    Stool color black     Sun-damaged skin     Tanning bed exposure      Past Surgical History:   Procedure Laterality Date    HAND/FINGER SURGERY UNLISTED      x3    HX BACK SURGERY      x4- 2001,2003,2009,2011    HX CHOLECYSTECTOMY  2002    HX GASTRIC BYPASS  5/2008    LAP BAND ( RESHMA RODRIGUEZ)    HX HEENT  2006    LASIK BILAT    HX HYSTERECTOMY  2005    HX ORTHOPAEDIC  2006,2010    HANDS  RIGHT X2; LEFT X1    HX TONSILLECTOMY  1964    HX TUBAL LIGATION  [id number] REV    x2 and a reversal    HX UROLOGICAL  04/29/2015    urethra sling    IR INJ SPINE FLUORO GUIDED  8/3/2020    IR INJ SPINE FLUORO GUIDED  10/5/2020    OH LAP, PLACE ADJUST GASTR BAND  5/2008      Family History   Problem Relation Age of Onset    Post-op Nausea/Vomiting Mother     Delayed Awakening Mother     Psychiatric Disorder Mother     Hypertension Mother     Stroke Mother     Headache Mother     Post-op Nausea/Vomiting Daughter     Delayed Awakening Daughter     Cancer Father     Hypertension Father     Cancer Maternal Grandmother     Psychiatric Disorder Maternal Grandmother     Dementia Paternal Grandmother     Dementia Amy Ch Parkinson's Disease Paternal Grandfather      Social History     Tobacco Use    Smoking status: Never Smoker    Smokeless tobacco: Never Used   Substance Use Topics    Alcohol use: No      Prior to Admission medications    Medication Sig Start Date End Date Taking? Authorizing Provider   estradioL (CLIMARA) 0.025 mg/24 hr  5/17/22  Yes Provider, Historical   insulin aspart U-100 (NovoLOG Flexpen U-100 Insulin) 100 unit/mL (3 mL) inpn Sliding scale before meals MAXIMUM DAILY DOSE 50 UNITS, 90 days 5/31/22  Yes Kiley Saunders MD   insulin detemir U-100 (LEVEMIR FLEXTOUCH) 100 unit/mL (3 mL) inpn Inject 29 units at bedtime, 90 days 5/31/22  Yes Kiley Saunders MD   Insulin Needles, Disposable, 31 gauge x 5/16\" ndle 4 times a day 5/31/22  Yes Kiley Saunders MD   metFORMIN (GLUCOPHAGE) 500 mg tablet TAKE 2 TABLETS TWICE DAILY 5/31/22  Yes Kiley Saunders MD   rosuvastatin (CRESTOR) 5 mg tablet Take 1 Tablet by mouth nightly for 90 days.  4/13/22 7/12/22 Yes Kiley Saunders MD   insulin detemir U-100 (LEVEMIR FLEXTOUCH) 100 unit/mL (3 mL) inpn Inject 18 units at bedtime 3/2/22  Yes Kiley Saunders MD   flash glucose scanning reader (FreeStyle Edilberto 2 [place]) misc Check glucose 4 times a day 11/29/21  Yes Kiley Saunders MD   flash glucose sensor (FreeStyle Edilberto 2 Sensor) kit Check glucose 4 times a day 11/29/21  Yes Kiley Saunders MD   montelukast (SINGULAIR) 10 mg tablet TAKE 1 TABLET EVERY DAY 10/25/21  Yes Yahir Wilson MD   insulin aspart U-100 (NOVOLOG) 100 unit/mL (3 mL) inpn Sliding scale before meals, maximum daily dose 40 units 10/15/21  Yes Kiley Saunders MD   alcohol swabs (BD Single Use Swabs Regular) padm USE THREE TIMES DAILY 9/27/21  Yes Kiley Saunders MD   lancets (Accu-Chek Softclix Lancets) misc TEST BLOOD SUGAR EVERY DAY 9/27/21  Yes Kiley Saunders MD   omeprazole (PRILOSEC) 20 mg capsule TAKE 1 CAPSULE EVERY DAY 8/4/21  Yes Cordell Coupe, NP   glucose blood VI test strips (Accu-Chek Larissa Plus test strp) strip TEST BLOOD SUGAR TWICE DAILY AS DIRECTED 8/2/21  Yes Ilia Ronquillo MD   gabapentin (NEURONTIN) 300 mg capsule TAKE 1 CAPSULE THREE TIMES DAILY 6/14/21  Yes Maru Jiménez NP   meloxicam (MOBIC) 15 mg tablet TAKE 1 TABLET EVERY DAY 6/9/21  Yes Maru Jiménez NP   hydroCHLOROthiazide (HYDRODIURIL) 12.5 mg tablet TAKE 1 TABLET EVERY DAY 6/9/21  Yes Maru Jiménez NP   traMADoL (ULTRAM) 50 mg tablet TAKE 1 TABLET BY MOUTH EVERY 4 HOURS AS NEEDED FOR PAIN 5/10/21  Yes Provider, Historical   Insulin Needles, Disposable, (Droplet Pen Needle) 31 gauge x 5/16\" ndle Use to take insulin once a day, 90 days 5/21/21  Yes Ilia Ronquillo MD   glucose blood VI test strips (Accu-Chek Guide test strips) strip Once a day 5/21/21  Yes Ilia Ronquillo MD   Blood-Glucose Meter (Accu-Chek Guide Glucose Meter) misc Once a day 5/21/21  Yes Ilia Ronquillo MD   lidocaine (XYLOCAINE) 2 % jelly APPLY A THIN FILM TO AFFECTED AREA ONLY (LOW BACK AND NECK ONLY) ONE TIME DAILY AS NEEDED 2/18/21  Yes Maru Jiménez NP   LORazepam (ATIVAN) 0.5 mg tablet Take 1 Tab by mouth two (2) times daily as needed for Anxiety. Max Daily Amount: 1 mg. 12/2/20  Yes Zoë Saha Do, NP   mometasone (ELOCON) 0.1 % ointment APPLY  TO AFFECTED AREA TWO TIMES DAILY AS NEEDED FOR SKIN IRRITATION. 7/30/20  Yes Maru Jiménez NP   Biotin 2,500 mcg cap Take  by mouth. Yes Provider, Historical   cetirizine (ZYRTEC) 10 mg tablet TK 1 T PO D PRN 6/18/18  Yes Provider, Historical   cholecalciferol, vitamin D3, 2,000 unit tab Take  by mouth. Yes Provider, Historical   naloxone 2 mg/actuation spry Use 1 spray intranasally into 1 nostril. Use a new Narcan nasal spray for subsequent doses and administer into alternating nostrils. May repeat every 2 to 3 minutes as needed.  3/8/18   Cline Collet, NP        No Known Allergies    Review of Systems:  ROS    A comprehensive review of systems was preformed and it is negative except mentioned in HPI    Objective:     Vitals:    05/31/22 1408   BP: 135/89   Pulse: 91   Temp: 97.8 °F (36.6 °C)   TempSrc: Temporal   SpO2: 94%   Weight: 170 lb 11.2 oz (77.4 kg)   Height: 5' 4\" (1.626 m)        Physical Exam:    Physical Exam  Vitals and nursing note reviewed. Constitutional:       Appearance: Normal appearance. HENT:      Head: Normocephalic and atraumatic. Cardiovascular:      Rate and Rhythm: Normal rate and regular rhythm. Pulmonary:      Effort: Pulmonary effort is normal.      Breath sounds: Normal breath sounds. Neurological:      Mental Status: She is alert. 3-2-2022:   diabetic foot exam:  Bilateral diabetic foot exam was performed today. Dorsalis pedis pulses 2+ bilaterally. Monofilament sensation normal bilaterally. No ulcers or skin breakdown. Procedure note:  CGM download: Freestyle Debdaniela AFTER-MOUSE was downloaded from 5- - 5-  CGMS TRACING PATTERNS    Overnight Trends (the most consistent pattern): Early morning hypoglycemia       prandial trends: Postprandial hyperglycemia    Additional Comments  See CGMS download and CGMS Patient Log in     CGMS RECOMMENDATIONS    Medication Changes: Decrease Levemir from 30 units to 29 units, give it 2 to 3 days and if she continues to have fasting hypoglycemia then she should decrease it to 28 units. Labs and Imaging:  Results for Idris Tate (MRN [id number]) as of 5/21/2021 12:05   Ref.  Range 2/24/2021 10:04   Sodium Latest Ref Range: 134 - 144 mmol/L 143   Potassium Latest Ref Range: 3.5 - 5.2 mmol/L 4.7   Chloride Latest Ref Range: 96 - 106 mmol/L 100   CO2 Latest Ref Range: 20 - 29 mmol/L 28   Glucose Latest Ref Range: 65 - 99 mg/dL 101 (H)   BUN Latest Ref Range: 8 - 27 mg/dL 14   Creatinine Latest Ref Range: 0.57 - 1.00 mg/dL 1.00   BUN/Creatinine ratio Latest Ref Range: 12 - 28  14   Calcium Latest Ref Range: 8.7 - 10.3 mg/dL 10.1   GFR est non-AA Latest Ref Range: >59 mL/min/1.73 61   GFR est AA Latest Ref Range: >59 mL/min/1.73 70   Bilirubin, total Latest Ref Range: 0.0 - 1.2 mg/dL 1.5 (H)   Protein, total Latest Ref Range: 6.0 - 8.5 g/dL 7.3   Albumin Latest Ref Range: 3.8 - 4.8 g/dL 4.7   A-G Ratio Latest Ref Range: 1.2 - 2.2  1.8   ALT Latest Ref Range: 0 - 32 IU/L 13   AST Latest Ref Range: 0 - 40 IU/L 15   Alk. phosphatase Latest Ref Range: 39 - 117 IU/L 87   Results for XIOMARA MALCOLM (MRN [id number]) as of 5/21/2021 12:05   Ref. Range 2/24/2021 10:04   Triglyceride Latest Ref Range: 0 - 149 mg/dL 175 (H)   Cholesterol, total Latest Ref Range: 100 - 199 mg/dL 151   HDL Cholesterol Latest Ref Range: >39 mg/dL 62   VLDL, calculated Latest Ref Range: 5 - 40 mg/dL 29   LDL, calculated Latest Ref Range: 0 - 99 mg/dL 60   Results for XIOMARA MALCOLM (MRN [id number]) as of 5/21/2021 12:05   Ref. Range 2/24/2021 10:04   Creatinine, urine Latest Ref Range: Not Estab. mg/dL 117.2   Microalbumin, urine Latest Ref Range: Not Estab. ug/mL 7.4   Microalbumin/Creat. Ratio Latest Ref Range: 0 - 29 mg/g creat 6   TSH Latest Ref Range: 0.450 - 4.500 uIU/mL 2.320     Last 3 Recorded Weights in this Encounter    05/31/22 1408   Weight: 170 lb 11.2 oz (77.4 kg)        Lab Results   Component Value Date/Time    Hemoglobin A1c 6.1 (H) 09/16/2019 02:28 PM    Hemoglobin A1c 7.3 (H) 12/14/2018 11:12 AM    Hemoglobin A1c 7.0 (H) 09/17/2018 10:45 AM    Hemoglobin A1c, External 6.3 01/24/2020 12:00 AM        Assessment:     Patient Active Problem List   Diagnosis Code    Lumbar stenosis M48.061    GERD (gastroesophageal reflux disease) K21.9    Dysphagia R13.10    Status post gastric banding Z98.84    DDD (degenerative disc disease), lumbar M51.36    Depression F32. A    Diverticulosis K57.90    NICOLE (obstructive sleep apnea) G47.33    Diabetes mellitus (HCC) E11.9    Urinary frequency R35.0    Advanced care planning/counseling discussion Z71.89    Type 2 diabetes mellitus with diabetic neuropathy ([facility] E11.40    Mixed hyperlipidemia E78.2    Benign essential HTN I10           Plan:     BEULAH/ type 1 diabetes mellitus uncontrolled   Hemoglobin A1c was 7.3% on 12-, 6.8% on 4-8-2019, 6.8% in 7-, 6.3% on 1-, 7.9% on 7-, 6.4% on 11-, 7.4% on 5-, 7.4% on 8-, 9.4% on 11-, 8.1% on 3-2-2022, 8.6% today. Fingerstick blood glucose 134 mg/dL in my office today. Up to date with diabetes related annual labs: yes 3-  Up to date with diabetic eye exam: yes February 2021  10-: Glucose 144, C-peptide low at 0.6, margaret 65 antibody very high at 7402, IA 2 antibody elevated at 87  plan: For now continue metformin 500 mg 2 tablets twice a day. Because of fasting hypoglycemia I am going to decrease Levemir from 30 units to 29 units, decrease to 28 units if she continues to have fasting hypoglycemia. Continue NovoLog 1 unit for 5 g of carbohydrate and low-dose correction factor. Patient will benefit from switching to insulin pump. I will send her information to tandem rep as well as I will send Dexcom prescription to DME company, as she will benefit from closed-loop system. I will see her back in 6 weeks. essential hypertension   blood pressure well-controlled on current medications. No microalbuminuria, last checked in March 2022    mixed hyperlipidemia   LDL was adequate at 36 in 1/2020, 60 in February 2021, 3-: Total cholesterol 149, triglycerides 95, LDL 69. Continue Crestor 5 mg daily at bedtime. Sleep apnea  Unable to tolerate CPAP. Has questions about inspire device. Referring patient to sleep medicine.     Orders Placed This Encounter    SLEEP MEDICINE REFERRAL     Referral Priority:   Routine     Referral Type:   Consultation     Referral Reason:   Specialty Services Required     Referred to Provider:   Rosenda Mclaughlin     Number of Visits Requested:   1    AMB POC GLUCOSE BLOOD, BY GLUCOSE MONITORING DEVICE    AMB POC HEMOGLOBIN A1C    NE CONTINUOUS GLUCOSE MONITORING ANALYSIS I&R    insulin aspart U-100 (NovoLOG Flexpen U-100 Insulin) 100 unit/mL (3 mL) inpn     Sig: Sliding scale before meals MAXIMUM DAILY DOSE 50 UNITS, 90 days     Dispense:  45 mL     Refill:  2    insulin detemir U-100 (LEVEMIR FLEXTOUCH) 100 unit/mL (3 mL) inpn     Sig: Inject 29 units at bedtime, 90 days     Dispense:  27 mL     Refill:  2    Insulin Needles, Disposable, 31 gauge x 5/16\" ndle     Si times a day     Dispense:  200 Each     Refill:  3    metFORMIN (GLUCOPHAGE) 500 mg tablet     Sig: TAKE 2 TABLETS TWICE DAILY     Dispense:  360 Tablet     Refill:  1        Signed By: Sherin Lentz MD     May 31, 2022      Return to clinic 6 weeks

## 2022-05-31 NOTE — TELEPHONE ENCOUNTER
Please fax patient information form, copy of patient's insurance and labs to [employer] with [employer] at [phone]. Please send Dexcom prescription to [facility].  Patient has type 1 diabetes mellitus with hypoglycemia. She is going to be on tandem insulin pump which communicates with Dexcom.

## 2022-05-31 NOTE — LETTER
5/31/2022    Patient: Mona Berman   YOB: 1960   Date of Visit: 5/31/2022     Corwin Leblanc MD  [address] [place] [address]  Via Fax: [phone]    Dear Corwin Leblanc MD,      Thank you for referring Ms. Selam Aguilar to [address] [place] [address] for evaluation. My notes for this consultation are attached. If you have questions, please do not hesitate to call me. I look forward to following your patient along with you.       Sincerely,    Abhishek Nevarez MD

## 2022-06-07 ENCOUNTER — TELEPHONE (OUTPATIENT)
Dept: ENDOCRINOLOGY | Age: 62
End: 2022-06-07

## 2022-06-07 DIAGNOSIS — E13.9 LADA (LATENT AUTOIMMUNE DIABETES IN ADULTS), MANAGED AS TYPE 1 (HCC): Primary | ICD-10-CM

## 2022-06-07 NOTE — TELEPHONE ENCOUNTER
Please fax endocrinology referral and last progress note, most recent labs to endocrinologist Dr. Doug Marquis at [phone]. Thanks!

## 2022-06-10 DIAGNOSIS — E13.9 LADA (LATENT AUTOIMMUNE DIABETES IN ADULTS), MANAGED AS TYPE 1 (HCC): Primary | ICD-10-CM

## 2022-06-10 RX ORDER — INSULIN ASPART 100 [IU]/ML
INJECTION, SOLUTION INTRAVENOUS; SUBCUTANEOUS
Qty: 30 ML | Refills: 4 | Status: SHIPPED | OUTPATIENT
Start: 2022-06-10 | End: 2022-07-12 | Stop reason: SDUPTHER

## 2022-07-12 ENCOUNTER — OFFICE VISIT (OUTPATIENT)
Dept: ENDOCRINOLOGY | Age: 62
End: 2022-07-12
Payer: MEDICARE

## 2022-07-12 VITALS
TEMPERATURE: 98.2 F | SYSTOLIC BLOOD PRESSURE: 136 MMHG | DIASTOLIC BLOOD PRESSURE: 82 MMHG | HEIGHT: 64 IN | BODY MASS INDEX: 31.41 KG/M2 | WEIGHT: 184 LBS | OXYGEN SATURATION: 98 % | HEART RATE: 92 BPM

## 2022-07-12 DIAGNOSIS — I10 BENIGN ESSENTIAL HTN: ICD-10-CM

## 2022-07-12 DIAGNOSIS — E78.2 MIXED HYPERLIPIDEMIA: ICD-10-CM

## 2022-07-12 DIAGNOSIS — G47.39 OTHER SLEEP APNEA: ICD-10-CM

## 2022-07-12 DIAGNOSIS — K21.9 GASTROESOPHAGEAL REFLUX DISEASE WITHOUT ESOPHAGITIS: ICD-10-CM

## 2022-07-12 DIAGNOSIS — E13.9 LADA (LATENT AUTOIMMUNE DIABETES IN ADULTS), MANAGED AS TYPE 1 (HCC): Primary | ICD-10-CM

## 2022-07-12 PROCEDURE — G8752 SYS BP LESS 140: HCPCS | Performed by: INTERNAL MEDICINE

## 2022-07-12 PROCEDURE — 99214 OFFICE O/P EST MOD 30 MIN: CPT | Performed by: INTERNAL MEDICINE

## 2022-07-12 PROCEDURE — 2022F DILAT RTA XM EVC RTNOPTHY: CPT | Performed by: INTERNAL MEDICINE

## 2022-07-12 PROCEDURE — G8754 DIAS BP LESS 90: HCPCS | Performed by: INTERNAL MEDICINE

## 2022-07-12 PROCEDURE — 95251 CONT GLUC MNTR ANALYSIS I&R: CPT | Performed by: INTERNAL MEDICINE

## 2022-07-12 PROCEDURE — 3017F COLORECTAL CA SCREEN DOC REV: CPT | Performed by: INTERNAL MEDICINE

## 2022-07-12 PROCEDURE — G8417 CALC BMI ABV UP PARAM F/U: HCPCS | Performed by: INTERNAL MEDICINE

## 2022-07-12 PROCEDURE — G8427 DOCREV CUR MEDS BY ELIG CLIN: HCPCS | Performed by: INTERNAL MEDICINE

## 2022-07-12 PROCEDURE — 3052F HG A1C>EQUAL 8.0%<EQUAL 9.0%: CPT | Performed by: INTERNAL MEDICINE

## 2022-07-12 PROCEDURE — G9717 DOC PT DX DEP/BP F/U NT REQ: HCPCS | Performed by: INTERNAL MEDICINE

## 2022-07-12 RX ORDER — INSULIN ASPART 100 [IU]/ML
INJECTION, SOLUTION INTRAVENOUS; SUBCUTANEOUS
Qty: 90 ML | Refills: 3 | Status: SHIPPED | OUTPATIENT
Start: 2022-07-12

## 2022-07-12 RX ORDER — DEXLANSOPRAZOLE 30 MG/1
30 CAPSULE, DELAYED RELEASE ORAL DAILY
Qty: 90 CAPSULE | Refills: 2 | Status: SHIPPED | OUTPATIENT
Start: 2022-07-12 | End: 2022-10-10

## 2022-07-12 RX ORDER — MELOXICAM 7.5 MG/1
7.5 TABLET ORAL DAILY
COMMUNITY
Start: 2022-07-07

## 2022-07-12 RX ORDER — ROSUVASTATIN CALCIUM 5 MG/1
5 TABLET, COATED ORAL
Qty: 90 TABLET | Refills: 3 | Status: SHIPPED | OUTPATIENT
Start: 2022-07-12 | End: 2022-10-10

## 2022-07-12 NOTE — LETTER
7/12/2022    Patient: Brenton Call   YOB: 1960   Date of Visit: 7/12/2022     Alex Puckett [place]  [address] [facility] [place] [address]  Via Fax: [phone]    Dear Alex Puckett MD,      Thank you for referring Ms. Mitali Powell to [address] [place] [address] for evaluation. My notes for this consultation are attached. If you have questions, please do not hesitate to call me. I look forward to following your patient along with you.       Sincerely,    Richie Beth MD

## 2022-07-12 NOTE — PROGRESS NOTES
History and Physical    Patient: Opal Austin MRN: [id number]  SSN: [id number]    YOB: 1960  Age: 57 y.o. Sex: female      Subjective:      Opal Austin is a 57 y.o. female with past medical history of hypertension, hyperlipidemia, obesity status post lap band in 2008 (lost 100 pounds) is here for follow-up of type 1 diabetes mellitus. She is in primary care of Dr. Temitope Perez. Patient started using tandem insulin pump in closed-loop system a few weeks back. She is doing well. Noticing high blood glucose frequently at night. Entering all carbs and insulin pump. Denies any hypoglycemia. She has gained 14 pounds since last visit. Exercising regularly. However, she has sleep apnea, does not tolerate CPAP very well. She has questions about inspire device. She was referred to sleep medicine at the last appointment. She had to reschedule the appointment and it is at the end of this month. She had acid reflux since a long time, she thinks it is related to Lap-Band. She is on omeprazole 20 mg but it is not helping. Recently she is noticing hoarseness of voice and dry cough. Glucometer reading: Dexcom was downloaded and reviewed with patient. Please see procedure note below.     Updated diabetes history:  · Diagnosis: 5 years  · Current treatment: NovoLog in insulin pump with settings as below  · Past treatment: Januvia, Invokana, metformin, Levemir  · Glucose checks: 1-2 times a day as above  · Hyperglycemia: rarely  · Hypoglycemia: yes  · Meals per day: 3, breakfast: egg/breakfast bar, lunch: squash and string beans, cabbage and chicken, dinner: chicken, shrimp, fish, snacks: bakes pretzels  · Exercise: sometimes walks when it is warm  · DM related hospitalizations: no  Complications of DM:  · CAD: no  · CVA: no  · PVD: no  · Amputations: no   · Retinopathy: no; last exam was May 2022  · Gastropathy: no  · Nephropathy: no  · Neuropathy: no  Medications:  · Statin: crestor 5 mg  · ACE-I: no  · ASA: no  · Diabetes education: no    Insulin pump settings:  Pump   Pump : Tandem t:slim  Insulin type NovoLog  Insulin Time: 5 hours     basal rate: 0.95 units/h  Correction factor: 50  Carb ratio: 5  Target 110      Past Medical History:   Diagnosis Date    Arrhythmia     \"IRREG HEARTBEAT\"  ( [facility])    Arthritis     Chronic pain     BACK    Depression     Diabetes ([address]     Dyspepsia and other specified disorders of function of stomach     GERD (gastroesophageal reflux disease)     Headache     Headache(784.0)     Hypertension     Hypertension 7/14/2020    Hyperthyroidism     Mixed hyperlipidemia 3/2/2022    Morbid obesity ([address]     Psychiatric disorder     DEPRESSION    Sleep apnea     no CPAP    Stool color black     Sun-damaged skin     Tanning bed exposure      Past Surgical History:   Procedure Laterality Date    HAND/FINGER SURGERY UNLISTED      x3    HX BACK SURGERY      x4- 2001,2003,2009,2011    HX CHOLECYSTECTOMY  2002    HX GASTRIC BYPASS  5/2008    LAP BAND ( RESHMA RODRIGUEZ)    HX HEENT  2006    LASIK BILAT    HX HYSTERECTOMY  2005    HX ORTHOPAEDIC  2006,2010    HANDS  RIGHT X2; LEFT X1    HX TONSILLECTOMY  1964    HX TUBAL LIGATION  [id number] REV    x2 and a reversal    HX UROLOGICAL  04/29/2015    urethra sling    IR INJ SPINE FLUORO GUIDED  8/3/2020    IR INJ SPINE FLUORO GUIDED  10/5/2020    ND LAP, PLACE ADJUST GASTR BAND  5/2008      Family History   Problem Relation Age of Onset    Post-op Nausea/Vomiting Mother     Delayed Awakening Mother     Psychiatric Disorder Mother     Hypertension Mother     Stroke Mother     Headache Mother     Post-op Nausea/Vomiting Daughter     Delayed Awakening Daughter     Cancer Father     Hypertension Father     Cancer Maternal Grandmother     Psychiatric Disorder Maternal Grandmother     Dementia Paternal Grandmother     Dementia Paternal Blanca Noel Parkinson's Disease Paternal Grandfather      Social History     Tobacco Use    Smoking status: Never Smoker    Smokeless tobacco: Never Used   Substance Use Topics    Alcohol use: No      Prior to Admission medications    Medication Sig Start Date End Date Taking? Authorizing Provider   meloxicam (MOBIC) 7.5 mg tablet Take 7.5 mg by mouth daily. 7/7/22  Yes Provider, Historical   insulin aspart U-100 (NovoLOG U-100 Insulin aspart) 100 unit/mL injection Use as directed in insulin pump, max daily dose 100 units, 90 days 7/12/22  Yes Dominique Thomas MD   rosuvastatin (CRESTOR) 5 mg tablet Take 1 Tablet by mouth nightly for 90 days. 7/12/22 10/10/22 Yes Dominique Thomas MD   dexlansoprazole (DEXILANT) 30 mg capsule Take 1 Capsule by mouth daily for 90 days.  7/12/22 10/10/22 Yes Dominique Thomas MD   estradioL (CLIMARA) 0.025 mg/24 hr  5/17/22  Yes Provider, Historical   Insulin Needles, Disposable, 31 gauge x 5/16\" ndle 4 times a day 5/31/22  Yes Dominique Thomas MD   montelukast (SINGULAIR) 10 mg tablet TAKE 1 TABLET EVERY DAY 10/25/21  Yes Markus Edwards MD   insulin aspart U-100 (NOVOLOG) 100 unit/mL (3 mL) inpn Sliding scale before meals, maximum daily dose 40 units 10/15/21  Yes Dominique Thomas MD   alcohol swabs (BD Single Use Swabs Regular) padm USE THREE TIMES DAILY 9/27/21  Yes Dominique Thomas MD   lancets (Accu-Chek Softclix Lancets) misc TEST BLOOD SUGAR EVERY DAY 9/27/21  Yes Dominique Thomas MD   omeprazole (PRILOSEC) 20 mg capsule TAKE 1 CAPSULE EVERY DAY 8/4/21  Yes Lesly CUELLAR NP   glucose blood VI test strips (Accu-Chek Larissa Plus test strp) strip TEST BLOOD SUGAR TWICE DAILY AS DIRECTED 8/2/21  Yes Dominique Thomas MD   gabapentin (NEURONTIN) 300 mg capsule TAKE 1 CAPSULE THREE TIMES DAILY 6/14/21  Yes Stephen Lopez NP   hydroCHLOROthiazide (HYDRODIURIL) 12.5 mg tablet TAKE 1 TABLET EVERY DAY 6/9/21  Yes Stephen Lopez NP   traMADoL (ULTRAM) 50 mg tablet TAKE 1 TABLET BY MOUTH EVERY 4 HOURS AS NEEDED FOR PAIN 5/10/21  Yes Provider, Historical   Insulin Needles, Disposable, (Droplet Pen Needle) 31 gauge x 5/16\" ndle Use to take insulin once a day, 90 days 5/21/21  Yes Janneth Landry MD   Blood-Glucose Meter (Accu-Chek Guide Glucose Meter) misc Once a day 5/21/21  Yes Janneth Landry MD   lidocaine (XYLOCAINE) 2 % jelly APPLY A THIN FILM TO AFFECTED AREA ONLY (LOW BACK AND NECK ONLY) ONE TIME DAILY AS NEEDED 2/18/21  Yes Radha Tolliver NP   LORazepam (ATIVAN) 0.5 mg tablet Take 1 Tab by mouth two (2) times daily as needed for Anxiety. Max Daily Amount: 1 mg. 12/2/20  Yes Zoë Zayas NP   mometasone (ELOCON) 0.1 % ointment APPLY  TO AFFECTED AREA TWO TIMES DAILY AS NEEDED FOR SKIN IRRITATION. 7/30/20  Yes Radha Tolliver NP   Biotin 2,500 mcg cap Take  by mouth. Yes Provider, Historical   cetirizine (ZYRTEC) 10 mg tablet TK 1 T PO D PRN 6/18/18  Yes Provider, Historical   naloxone 2 mg/actuation spry Use 1 spray intranasally into 1 nostril. Use a new Narcan nasal spray for subsequent doses and administer into alternating nostrils. May repeat every 2 to 3 minutes as needed. 3/8/18  Yes Josue South NP   cholecalciferol, vitamin D3, 2,000 unit tab Take  by mouth.    Yes Provider, Historical   insulin detemir U-100 (LEVEMIR FLEXTOUCH) 100 unit/mL (3 mL) inpn Inject 29 units at bedtime, 90 days 5/31/22   Janneth Landry MD   insulin detemir U-100 (LEVEMIR FLEXTOUCH) 100 unit/mL (3 mL) inpn Inject 18 units at bedtime 3/2/22   Janneth Landry MD   flash glucose scanning reader (FreeStyle Edilberto 2 [place]) misc Check glucose 4 times a day  Patient not taking: Reported on 7/12/2022 11/29/21   Janneth Landry MD   flash glucose sensor (FreeStyle Edilberto 2 Sensor) kit Check glucose 4 times a day  Patient not taking: Reported on 7/12/2022 11/29/21   Janneth Landry MD   glucose blood VI test strips (Accu-Chek Guide test strips) strip Once a day 5/21/21   Janneth Landry MD        No Known Allergies    Review of Systems:  BHUPENDRA LONG comprehensive review of systems was preformed and it is negative except mentioned in HPI    Objective:     Vitals:    07/12/22 1459   BP: 136/82   Pulse: 92   Temp: 98.2 °F (36.8 °C)   TempSrc: Temporal   SpO2: 98%   Weight: 184 lb (83.5 kg)   Height: 5' 4\" (1.626 m)        Physical Exam:    Physical Exam  Vitals and nursing note reviewed. Constitutional:       Appearance: Normal appearance. HENT:      Head: Normocephalic and atraumatic. Cardiovascular:      Rate and Rhythm: Normal rate and regular rhythm. Pulmonary:      Effort: Pulmonary effort is normal.      Breath sounds: Normal breath sounds. Neurological:      Mental Status: She is alert. 3-2-2022:   diabetic foot exam:  Bilateral diabetic foot exam was performed today. Dorsalis pedis pulses 2+ bilaterally. Monofilament sensation normal bilaterally. No ulcers or skin breakdown. Procedure note:  CGM download: Insulin pump and was downloaded from 6- - 7-  CGMS TRACING PATTERNS    Overnight Trends (the most consistent pattern): Usually hyperglycemia with the pump having to give a lot of correction to bring her blood glucose down       prandial trends: Varies from euglycemia to postprandial hyperglycemia    Additional Comments  See CGMS download and CGMS Patient Log in     CGMS RECOMMENDATIONS    Medication Changes: Increase basal rate from 0.95 units/h to 1.15 units/h, change correction factor from 50-40    Labs and Imaging:  Results for Unknown Ban (MRN [id number]) as of 5/21/2021 12:05   Ref.  Range 2/24/2021 10:04   Sodium Latest Ref Range: 134 - 144 mmol/L 143   Potassium Latest Ref Range: 3.5 - 5.2 mmol/L 4.7   Chloride Latest Ref Range: 96 - 106 mmol/L 100   CO2 Latest Ref Range: 20 - 29 mmol/L 28   Glucose Latest Ref Range: 65 - 99 mg/dL 101 (H)   BUN Latest Ref Range: 8 - 27 mg/dL 14   Creatinine Latest Ref Range: 0.57 - 1.00 mg/dL 1.00   BUN/Creatinine ratio Latest Ref Range: 12 - 28  14   Calcium Latest Ref Range: 8.7 - 10.3 mg/dL 10.1   GFR est non-AA Latest Ref Range: >59 mL/min/1.73 61   GFR est AA Latest Ref Range: >59 mL/min/1.73 70   Bilirubin, total Latest Ref Range: 0.0 - 1.2 mg/dL 1.5 (H)   Protein, total Latest Ref Range: 6.0 - 8.5 g/dL 7.3   Albumin Latest Ref Range: 3.8 - 4.8 g/dL 4.7   A-G Ratio Latest Ref Range: 1.2 - 2.2  1.8   ALT Latest Ref Range: 0 - 32 IU/L 13   AST Latest Ref Range: 0 - 40 IU/L 15   Alk. phosphatase Latest Ref Range: 39 - 117 IU/L 87   Results for XIOMARA MALCOLM (MRN [id number]) as of 5/21/2021 12:05   Ref. Range 2/24/2021 10:04   Triglyceride Latest Ref Range: 0 - 149 mg/dL 175 (H)   Cholesterol, total Latest Ref Range: 100 - 199 mg/dL 151   HDL Cholesterol Latest Ref Range: >39 mg/dL 62   VLDL, calculated Latest Ref Range: 5 - 40 mg/dL 29   LDL, calculated Latest Ref Range: 0 - 99 mg/dL 60   Results for XIOMARA MALCOLM (MRN [id number]) as of 5/21/2021 12:05   Ref. Range 2/24/2021 10:04   Creatinine, urine Latest Ref Range: Not Estab. mg/dL 117.2   Microalbumin, urine Latest Ref Range: Not Estab. ug/mL 7.4   Microalbumin/Creat. Ratio Latest Ref Range: 0 - 29 mg/g creat 6   TSH Latest Ref Range: 0.450 - 4.500 uIU/mL 2.320     Last 3 Recorded Weights in this Encounter    07/12/22 1459   Weight: 184 lb (83.5 kg)        Lab Results   Component Value Date/Time    Hemoglobin A1c 6.1 (H) 09/16/2019 02:28 PM    Hemoglobin A1c 7.3 (H) 12/14/2018 11:12 AM    Hemoglobin A1c 7.0 (H) 09/17/2018 10:45 AM    Hemoglobin A1c, External 6.3 01/24/2020 12:00 AM        Assessment:     Patient Active Problem List   Diagnosis Code    Lumbar stenosis M48.061    GERD (gastroesophageal reflux disease) K21.9    Dysphagia R13.10    Status post gastric banding Z98.84    DDD (degenerative disc disease), lumbar M51.36    Depression F32. A    Diverticulosis K57.90    NICOLE (obstructive sleep apnea) G47.33    Diabetes mellitus ([facility] E11.9    Urinary frequency R35.0    Advanced care planning/counseling discussion Z71.89    Type 2 diabetes mellitus with diabetic neuropathy ([facility] [address] E11.40    Mixed hyperlipidemia E78.2    Benign essential HTN I10           Plan:     BEULAH/ type 1 diabetes mellitus uncontrolled   Hemoglobin A1c was 7.3% on 12-, 6.8% on 4-8-2019, 6.8% in 7-, 6.3% on 1-, 7.9% on 7-, 6.4% on 11-, 7.4% on 5-, 7.4% on 8-, 9.4% on 11-, 8.1% on 3-2-2022, 8.6% on 5-. Up to date with diabetes related annual labs: yes 3-  Up to date with diabetic eye exam: yes May 2022  10-: Glucose 144, C-peptide low at 0.6, margaret 65 antibody very high at 7402, IA 2 antibody elevated at 87  plan:  Glucose control is not adequate. Increase basal rate from 0.95 units/h to 1.15 units/h. Change correction factor from 50-40. Continue current carb ratio. Encourage patient to try to stay under 50 g of carb per meal and 15 to 20 g of carb per snack and continue to exercise. essential hypertension   blood pressure well-controlled on current medications. No microalbuminuria, last checked in March 2022    mixed hyperlipidemia   LDL was adequate at 36 in 1/2020, 60 in February 2021, 3-: Total cholesterol 149, triglycerides 95, LDL 69. Continue Crestor 5 mg daily at bedtime. Sleep apnea  Unable to tolerate CPAP. Has questions about inspire device. Clearly she has symptoms related to untreated sleep apnea. Encourage patient to keep appointment with sleep medicine. GERD  Not well controlled with omeprazole 20 mg.  Start dexlansoprazole 30 mg daily. Orders Placed This Encounter    CT CONTINUOUS GLUCOSE MONITORING ANALYSIS I&R    insulin aspart U-100 (NovoLOG U-100 Insulin aspart) 100 unit/mL injection     Sig: Use as directed in insulin pump, max daily dose 100 units, 90 days     Dispense:  90 mL     Refill:  3    rosuvastatin (CRESTOR) 5 mg tablet     Sig: Take 1 Tablet by mouth nightly for 90 days.      Dispense:  90 Tablet     Refill:  3    dexlansoprazole (DEXILANT) 30 mg capsule     Sig: Take 1 Capsule by mouth daily for 90 days.      Dispense:  90 Capsule     Refill:  2        Signed By: Librado Tian MD     July 12, 2022      Return to clinic

## 2023-09-07 ENCOUNTER — HOSPITAL ENCOUNTER (OUTPATIENT)
Age: 63
Discharge: HOME | End: 2023-09-07
Payer: MEDICARE

## 2023-09-07 ENCOUNTER — OFFICE VISIT (OUTPATIENT)
Age: 63
End: 2023-09-07

## 2023-09-07 VITALS — WEIGHT: 182 LBS | BODY MASS INDEX: 31.07 KG/M2 | HEIGHT: 64 IN

## 2023-09-07 DIAGNOSIS — M25.561 PAIN IN BOTH KNEES, UNSPECIFIED CHRONICITY: Primary | ICD-10-CM

## 2023-09-07 DIAGNOSIS — M25.562 PAIN IN BOTH KNEES, UNSPECIFIED CHRONICITY: Primary | ICD-10-CM

## 2023-09-07 DIAGNOSIS — M25.561 PAIN IN BOTH KNEES, UNSPECIFIED CHRONICITY: ICD-10-CM

## 2023-09-07 DIAGNOSIS — M25.562 PAIN IN BOTH KNEES, UNSPECIFIED CHRONICITY: ICD-10-CM

## 2023-09-07 DIAGNOSIS — M17.0 OSTEOARTHRITIS OF BOTH KNEES, UNSPECIFIED OSTEOARTHRITIS TYPE: ICD-10-CM

## 2023-09-07 PROCEDURE — 73562 X-RAY EXAM OF KNEE 3: CPT

## 2023-09-07 RX ORDER — TRIAMCINOLONE ACETONIDE 40 MG/ML
40 INJECTION, SUSPENSION INTRA-ARTICULAR; INTRAMUSCULAR ONCE
Status: COMPLETED | OUTPATIENT
Start: 2023-09-07 | End: 2023-09-07

## 2023-09-07 RX ORDER — DULOXETIN HYDROCHLORIDE 60 MG/1
CAPSULE, DELAYED RELEASE ORAL
COMMUNITY
Start: 2023-08-03

## 2023-09-07 RX ORDER — HYDROCHLOROTHIAZIDE 12.5 MG/1
CAPSULE, GELATIN COATED ORAL
COMMUNITY
Start: 2023-08-03

## 2023-09-07 RX ORDER — METHOCARBAMOL 500 MG/1
TABLET, FILM COATED ORAL
COMMUNITY
Start: 2023-07-31

## 2023-09-07 RX ORDER — LIDOCAINE HYDROCHLORIDE 10 MG/ML
9 INJECTION, SOLUTION INFILTRATION; PERINEURAL ONCE
Status: COMPLETED | OUTPATIENT
Start: 2023-09-07 | End: 2023-09-07

## 2023-09-07 RX ADMIN — LIDOCAINE HYDROCHLORIDE 9 ML: 10 INJECTION, SOLUTION INFILTRATION; PERINEURAL at 16:05

## 2023-09-07 RX ADMIN — TRIAMCINOLONE ACETONIDE 40 MG: 40 INJECTION, SUSPENSION INTRA-ARTICULAR; INTRAMUSCULAR at 16:05

## 2023-09-07 RX ADMIN — TRIAMCINOLONE ACETONIDE 40 MG: 40 INJECTION, SUSPENSION INTRA-ARTICULAR; INTRAMUSCULAR at 16:04

## 2023-09-07 SDOH — HEALTH STABILITY: PHYSICAL HEALTH: ON AVERAGE, HOW MANY DAYS PER WEEK DO YOU ENGAGE IN MODERATE TO STRENUOUS EXERCISE (LIKE A BRISK WALK)?: 0 DAYS

## 2023-09-07 ASSESSMENT — SOCIAL DETERMINANTS OF HEALTH (SDOH)

## 2023-09-07 NOTE — PROGRESS NOTES
Technique: Under sterile conditions a Filao ultrasound unit with a variable frequency (7.0-14.0 MHz) linear transducer was used to localize the placement of needle into the bilateral knee(s) joint. Findings: Successful needle placement for knee injection. Final images were taken and saved for permanent record. The patient tolerated the injection well. The patient was instructed to call the office immediately if there is any pain, redness, warmth, fever, or chills. Visit Diagnoses         Codes    Pain in both knees, unspecified chronicity    -  Primary M25.561, M25.562    Osteoarthritis of both knees, unspecified osteoarthritis type     M17.0               HPI:  The patient is here with a chief complaint of bilateral knee pain. Responded well to cortisone injection in the left knee. Pain is 6/10. X-rays of bilateral knees done at [facility] are positive for moderate OA, both knees. Assessment/Plan:  Plan will be for right knee cortisone injection. We will see her back as needed. No restrictions in the meantime. If she gets worse, she is to give me a call and we will go from there. As part of continued conservative pain management options the patient was advised to utilize Tylenol or OTC NSAIDS as long as it is not medically contraindicated. Return to Office: Follow-up and Dispositions    Return if symptoms worsen or fail to improve. Scribed by Yogi Arenas LPN as dictated by [facility] CLEMENTE Arora MD.  Documentation, performed by, True and Accepted Ranjith Arora MD

## 2023-10-05 ENCOUNTER — OFFICE VISIT (OUTPATIENT)
Age: 63
End: 2023-10-05

## 2023-10-05 VITALS — BODY MASS INDEX: 29.88 KG/M2 | WEIGHT: 175 LBS | HEIGHT: 64 IN

## 2023-10-05 DIAGNOSIS — M25.561 RIGHT KNEE PAIN, UNSPECIFIED CHRONICITY: Primary | ICD-10-CM

## 2023-10-05 NOTE — PROGRESS NOTES
utilize Tylenol or OTC NSAIDS as long as it is not medically contraindicated. Return to Office: Follow-up and Dispositions    Return for POST MRI. Scribed by Lee Cornelius LPN as dictated by [facility] [place] CLEMENTE Farias MD.  Documentation, performed by, True and Accepted Ranjith Farias MD

## 2023-10-16 ENCOUNTER — HOSPITAL ENCOUNTER (OUTPATIENT)
Age: 63
Discharge: HOME | End: 2023-10-19
Attending: ORTHOPAEDIC SURGERY
Payer: MEDICARE

## 2023-10-16 DIAGNOSIS — M25.561 RIGHT KNEE PAIN, UNSPECIFIED CHRONICITY: ICD-10-CM

## 2023-10-16 PROCEDURE — 73721 MRI JNT OF LWR EXTRE W/O DYE: CPT

## 2023-10-26 ENCOUNTER — OFFICE VISIT (OUTPATIENT)
Age: 63
End: 2023-10-26

## 2023-10-26 ENCOUNTER — HOSPITAL ENCOUNTER (OUTPATIENT)
Age: 63
End: 2023-10-26
Payer: MEDICARE

## 2023-10-26 ENCOUNTER — HOSPITAL ENCOUNTER (OUTPATIENT)
Age: 63
Discharge: HOME | End: 2023-10-26
Payer: MEDICARE

## 2023-10-26 DIAGNOSIS — M17.11 OSTEOARTHRITIS OF RIGHT KNEE, UNSPECIFIED OSTEOARTHRITIS TYPE: ICD-10-CM

## 2023-10-26 DIAGNOSIS — Z01.818 PRE-OP TESTING: ICD-10-CM

## 2023-10-26 DIAGNOSIS — M25.561 RIGHT KNEE PAIN, UNSPECIFIED CHRONICITY: ICD-10-CM

## 2023-10-26 DIAGNOSIS — M25.561 RIGHT KNEE PAIN, UNSPECIFIED CHRONICITY: Primary | ICD-10-CM

## 2023-10-26 LAB
ANION GAP SERPL CALC-SCNC: 4 MMOL/L (ref 3–18)
BUN SERPL-MCNC: 19 MG/DL (ref 7–18)
BUN/CREAT SERPL: 19 (ref 12–20)
CA-I BLD-MCNC: 9 MG/DL (ref 8.5–10.1)
CHLORIDE SERPL-SCNC: 101 MMOL/L (ref 100–111)
CO2 SERPL-SCNC: 35 MMOL/L (ref 21–32)
CREAT SERPL-MCNC: 1 MG/DL (ref 0.6–1.3)
EKG ATRIAL RATE: 103 BPM
EKG DIAGNOSIS: NORMAL
EKG P AXIS: 47 DEGREES
EKG P-R INTERVAL: 168 MS
EKG Q-T INTERVAL: 344 MS
EKG QRS DURATION: 78 MS
EKG QTC CALCULATION (BAZETT): 450 MS
EKG R AXIS: 228 DEGREES
EKG T AXIS: 52 DEGREES
EKG VENTRICULAR RATE: 103 BPM
ERYTHROCYTE [DISTWIDTH] IN BLOOD BY AUTOMATED COUNT: 13.1 % (ref 11.6–14.5)
GLUCOSE SERPL-MCNC: 96 MG/DL (ref 74–99)
HCT VFR BLD AUTO: 44.5 % (ref 35–45)
HGB BLD-MCNC: 14.8 G/DL (ref 12–16)
MCH RBC QN AUTO: 31.9 PG (ref 24–34)
MCHC RBC AUTO-ENTMCNC: 33.3 G/DL (ref 31–37)
MCV RBC AUTO: 95.9 FL (ref 78–100)
NRBC # BLD: 0 K/UL (ref 0–0.01)
NRBC BLD-RTO: 0 PER 100 WBC
PLATELET # BLD AUTO: 212 K/UL (ref 135–420)
PMV BLD AUTO: 10 FL (ref 9.2–11.8)
POTASSIUM SERPL-SCNC: 3.3 MMOL/L (ref 3.5–5.5)
RBC # BLD AUTO: 4.64 M/UL (ref 4.2–5.3)
SODIUM SERPL-SCNC: 140 MMOL/L (ref 136–145)
WBC # BLD AUTO: 8.7 K/UL (ref 4.6–13.2)

## 2023-10-26 PROCEDURE — 93005 ELECTROCARDIOGRAM TRACING: CPT

## 2023-10-26 PROCEDURE — 85027 COMPLETE CBC AUTOMATED: CPT

## 2023-10-26 PROCEDURE — 80048 BASIC METABOLIC PNL TOTAL CA: CPT

## 2023-10-26 PROCEDURE — 71046 X-RAY EXAM CHEST 2 VIEWS: CPT

## 2023-10-26 RX ORDER — ONDANSETRON 8 MG/1
8 TABLET, ORALLY DISINTEGRATING ORAL EVERY 8 HOURS PRN
Qty: 20 TABLET | Refills: 0 | Status: SHIPPED | OUTPATIENT
Start: 2023-10-26 | End: 2023-11-02

## 2023-10-26 RX ORDER — ASPIRIN 325 MG
325 TABLET ORAL 2 TIMES DAILY
Qty: 60 TABLET | Refills: 0 | Status: SHIPPED | OUTPATIENT
Start: 2023-10-26

## 2023-10-26 RX ORDER — OXYCODONE HYDROCHLORIDE AND ACETAMINOPHEN 5; 325 MG/1; MG/1
1 TABLET ORAL
Qty: 30 TABLET | Refills: 0 | Status: SHIPPED | OUTPATIENT
Start: 2023-10-26 | End: 2023-11-03

## 2023-10-26 RX ORDER — CEPHALEXIN 500 MG/1
500 CAPSULE ORAL EVERY 8 HOURS
Qty: 21 CAPSULE | Refills: 0 | Status: SHIPPED | OUTPATIENT
Start: 2023-10-26 | End: 2023-11-02

## 2023-10-27 LAB
BACTERIA SPEC CULT: NORMAL
BACTERIA SPEC CULT: NORMAL
Lab: NORMAL

## 2023-10-30 DIAGNOSIS — Z96.651 STATUS POST TOTAL RIGHT KNEE REPLACEMENT: Primary | ICD-10-CM

## 2023-11-15 ENCOUNTER — TELEMEDICINE (OUTPATIENT)
Age: 63
End: 2023-11-15

## 2023-11-15 ENCOUNTER — TELEPHONE (OUTPATIENT)
Age: 63
End: 2023-11-15

## 2023-11-15 DIAGNOSIS — Z96.651 STATUS POST TOTAL RIGHT KNEE REPLACEMENT: Primary | ICD-10-CM

## 2023-11-15 RX ORDER — OXYCODONE HYDROCHLORIDE AND ACETAMINOPHEN 5; 325 MG/1; MG/1
1 TABLET ORAL
Qty: 30 TABLET | Refills: 0 | Status: SHIPPED | OUTPATIENT
Start: 2023-11-15 | End: 2023-11-23

## 2023-11-15 RX ORDER — ALENDRONATE SODIUM 35 MG/1
TABLET ORAL
COMMUNITY
Start: 2023-10-30

## 2023-11-15 NOTE — PROGRESS NOTES
Sherryle Liming (:  1960) is a Established patient, evaluated via telephone on 11/15/2023    [address], [address] [address]  Dept: [phone]  Dept Fax: [phone]   Chief Complaint   Patient presents with    Post-Op Check    Knee Pain     Patient-Reported Vitals  No data recorded   No Known Allergies  Current Outpatient Medications   Medication Sig Dispense Refill    alendronate (FOSAMAX) 35 MG tablet       DULoxetine (CYMBALTA) 60 MG extended release capsule       hydroCHLOROthiazide (MICROZIDE) 12.5 MG capsule       estradiol (CLIMARA) 0.025 MG/24HR ceived the following from [facility]: Outside name: estradioL (CLIMARA) 0.025 mg/24 hr      gabapentin (NEURONTIN) 300 MG capsule TAKE 1 CAPSULE THREE TIMES DAILY      insulin aspart (NOVOLOG) 100 UNIT/ML injection vial Use as directed in insulin pump, max daily dose 100 units, 90 days      LORazepam (ATIVAN) 0.5 MG tablet Take 0.5 mg by mouth 2 times daily as needed. montelukast (SINGULAIR) 10 MG tablet TAKE 1 TABLET EVERY DAY      traMADol (ULTRAM) 50 MG tablet TAKE 1 TABLET BY MOUTH EVERY 4 HOURS AS NEEDED FOR PAIN       No current facility-administered medications for this visit.       Patient Active Problem List   Diagnosis    DDD (degenerative disc disease), lumbar    Depression    Advanced care planning/counseling discussion    Benign essential HTN    Diverticulosis    Type 2 diabetes mellitus with diabetic neuropathy (HCC)    NEYMAR (obstructive sleep apnea)    Urinary frequency    GERD (gastroesophageal reflux disease)    Dysphagia    Lumbar stenosis    Status post gastric banding    Diabetes mellitus ([address])    Mixed hyperlipidemia      Family History   Problem Relation Age of Onset    Post-op Nausea/Vomiting Mother     Delayed Awakening Mother     Psychiatric Disorder Mother     Hypertension Mother     Stroke Mother

## 2023-11-15 NOTE — TELEPHONE ENCOUNTER
11/07/23 tramadol was filled and mailed to her and you sent in today percocet. Per pharmacist she gets tramadol regularly. Pharmacist wants to know if you are aware of the tramadol. .. is the tramadol suppose to be held. ... how do you want to go about this? Sreekanth Leung at [address] [place] [address] in [address] [facility] [phone]    Can you please call her back.      11/08/23 RT GEOVANNA

## 2023-11-15 NOTE — PATIENT INSTRUCTIONS
Post Operative Total Knee Replacement Instructions    PLEASE REMOVE YOUR LONG WHITE BANDAGE & STOCKING PRIOR TO CONNECTING TO YOUR APPOINTMENT       During your recovery from a total knee replacement, you will be participating in an OUTPATIENT physical therapy program. Your goal is to progress from a walker to a cane to nothing at all while walking, if possible, over the next 2 weeks. You can now shower and get your incision wet, pat it dry afterwards. No further dressing changes will be required as long as there is no drainage. You may take a bath 3 weeks post surgery as long as there is no drainage from your incision. You may drive if you are not using any assistive devices to walk and are not using any narcotic pain medication. You may discontinue your aspirin (if that is your primary blood thinner prescribed by Dr. Cheyanne Chaudhari ) when you are at least 4 weeks out from surgery and are no longer using a cane or walker. If you are still using assistive devices, please DO NOT stop the aspirin until you are completely off them. If you are on other blood thinners prescribed by another doctor please continue that until you are instructed to discontinue them. You and your physical therapist will determine when to stop your physical therapy program.    Narcotic pain medication can cause constipation. You may take over the counter stool softeners such as Docusate Sodium or Miralax 1-2 times per day to assist with the constipation. Ensure you are taking in plenty of fluids and fiber as well. If you require a refill on a narcotic pain medication, please let Dr. Cheyanne Chaudhari or his Nurse Practitioner know at your appointment today or AT LEAST 48 hours prior to needing it. No refills will be provided after hours or during weekends. If you experience any significant calf pain, swelling, or shortness of breath, please call our office immediately or go to the nearest emergency room.     If you notice any significant

## 2023-12-04 NOTE — PROGRESS NOTES
Sherryle Liming (:  1960) is a Established patient, evaluated via telephone on 2023    [address], [address] [address]  Dept: [phone]  Dept Fax: [phone]   Chief Complaint   Patient presents with    Post-Op Check    Knee Pain     Patient-Reported Vitals  No data recorded   No Known Allergies  Current Outpatient Medications   Medication Sig Dispense Refill    alendronate (FOSAMAX) 35 MG tablet       DULoxetine (CYMBALTA) 60 MG extended release capsule       hydroCHLOROthiazide (MICROZIDE) 12.5 MG capsule       estradiol (CLIMARA) 0.025 MG/24HR ceived the following from [facility]: Outside name: estradioL (CLIMARA) 0.025 mg/24 hr      gabapentin (NEURONTIN) 300 MG capsule TAKE 1 CAPSULE THREE TIMES DAILY      insulin aspart (NOVOLOG) 100 UNIT/ML injection vial Use as directed in insulin pump, max daily dose 100 units, 90 days      LORazepam (ATIVAN) 0.5 MG tablet Take 0.5 mg by mouth 2 times daily as needed. montelukast (SINGULAIR) 10 MG tablet TAKE 1 TABLET EVERY DAY      traMADol (ULTRAM) 50 MG tablet TAKE 1 TABLET BY MOUTH EVERY 4 HOURS AS NEEDED FOR PAIN       No current facility-administered medications for this visit.       Patient Active Problem List   Diagnosis    DDD (degenerative disc disease), lumbar    Depression    Advanced care planning/counseling discussion    Benign essential HTN    Diverticulosis    Type 2 diabetes mellitus with diabetic neuropathy (HCC)    NEYMAR (obstructive sleep apnea)    Urinary frequency    GERD (gastroesophageal reflux disease)    Dysphagia    Lumbar stenosis    Status post gastric banding    Diabetes mellitus ([address])    Mixed hyperlipidemia      Family History   Problem Relation Age of Onset    Post-op Nausea/Vomiting Mother     Delayed Awakening Mother     Psychiatric Disorder Mother     Hypertension Mother     Stroke Mother

## 2023-12-04 NOTE — PATIENT INSTRUCTIONS
Patient Education        Total Knee Replacement: What to Expect at [facility] had a total knee replacement. The doctor replaced the worn ends of the bones that connect to your knee (thighbone and lower leg bone) with plastic and metal parts. When you leave the hospital, you should be able to move around with a walker or crutches. But you will need someone to help you at home until you have more energy and can move around better. You will go home with a bandage and stitches, staples, skin glue, or tape strips. Change the bandage as your doctor tells you to. If you have stitches or staples, your doctor will remove them about 2 weeks after your surgery. Glue or tape strips will fall off on their own over time. You may still have some mild pain, and the area may be swollen for a few months after surgery. Your knee will continue to improve for up to a year. You will probably use a walker for some time after surgery. When you are ready, you can use a cane. You may be able to walk without support after a couple weeks, or when you are comfortable. You will need to do months of physical rehabilitation (rehab) after a knee replacement. Rehab will help you strengthen the muscles of the knee and help you regain movement. After you recover, your artificial knee will allow you to do normal daily activities with less pain or no pain at all. You may be able to hike, dance, or ride a bike. Talk to your doctor about whether you can do more strenuous activities. Always tell your caregivers that you have an artificial knee. How long it will take to walk on your own, return to normal activities, and go back to work depends on your health and how well your rehabilitation (rehab) program goes. The better you do with your rehab exercises, the quicker you will get your strength and movement back. This care sheet gives you a general idea about how long it will take for you to recover.  But each person recovers at a

## 2023-12-06 ENCOUNTER — TELEMEDICINE (OUTPATIENT)
Age: 63
End: 2023-12-06

## 2023-12-06 DIAGNOSIS — Z96.651 STATUS POST TOTAL RIGHT KNEE REPLACEMENT: Primary | ICD-10-CM

## 2023-12-06 DIAGNOSIS — M25.561 RIGHT KNEE PAIN, UNSPECIFIED CHRONICITY: ICD-10-CM

## 2024-02-19 ENCOUNTER — HOSPITAL ENCOUNTER (OUTPATIENT)
Age: 64
Discharge: HOME | End: 2024-02-22
Payer: MEDICARE

## 2024-02-19 ENCOUNTER — OFFICE VISIT (OUTPATIENT)
Age: 64
End: 2024-02-19

## 2024-02-19 DIAGNOSIS — Z01.818 PRE-OP TESTING: ICD-10-CM

## 2024-02-19 DIAGNOSIS — M17.12 OSTEOARTHRITIS OF LEFT KNEE, UNSPECIFIED OSTEOARTHRITIS TYPE: Primary | ICD-10-CM

## 2024-02-19 DIAGNOSIS — E11.40 TYPE 2 DIABETES MELLITUS WITH DIABETIC NEUROPATHY, WITH LONG-TERM CURRENT USE OF INSULIN (HCC): ICD-10-CM

## 2024-02-19 DIAGNOSIS — Z79.4 TYPE 2 DIABETES MELLITUS WITH DIABETIC NEUROPATHY, WITH LONG-TERM CURRENT USE OF INSULIN (HCC): ICD-10-CM

## 2024-02-19 DIAGNOSIS — R13.10 DYSPHAGIA, UNSPECIFIED TYPE: ICD-10-CM

## 2024-02-19 DIAGNOSIS — I10 BENIGN ESSENTIAL HTN: ICD-10-CM

## 2024-02-19 DIAGNOSIS — M17.12 OSTEOARTHRITIS OF LEFT KNEE, UNSPECIFIED OSTEOARTHRITIS TYPE: ICD-10-CM

## 2024-02-19 DIAGNOSIS — Z96.652 STATUS POST TOTAL LEFT KNEE REPLACEMENT: Primary | ICD-10-CM

## 2024-02-19 LAB
ANION GAP SERPL CALC-SCNC: 6 MMOL/L (ref 3–18)
BUN SERPL-MCNC: 14 MG/DL (ref 7–18)
BUN/CREAT SERPL: 12 (ref 12–20)
CA-I BLD-MCNC: 9.7 MG/DL (ref 8.5–10.1)
CHLORIDE SERPL-SCNC: 106 MMOL/L (ref 100–111)
CO2 SERPL-SCNC: 31 MMOL/L (ref 21–32)
CREAT SERPL-MCNC: 1.16 MG/DL (ref 0.6–1.3)
EKG ATRIAL RATE: 109 BPM
EKG DIAGNOSIS: NORMAL
EKG P AXIS: 48 DEGREES
EKG P-R INTERVAL: 174 MS
EKG Q-T INTERVAL: 336 MS
EKG QRS DURATION: 76 MS
EKG QTC CALCULATION (BAZETT): 452 MS
EKG R AXIS: 230 DEGREES
EKG T AXIS: 42 DEGREES
EKG VENTRICULAR RATE: 109 BPM
ERYTHROCYTE [DISTWIDTH] IN BLOOD BY AUTOMATED COUNT: 12.9 % (ref 11.6–14.5)
GLUCOSE SERPL-MCNC: 144 MG/DL (ref 74–99)
HCT VFR BLD AUTO: 43.3 % (ref 35–45)
HGB BLD-MCNC: 14.3 G/DL (ref 12–16)
MCH RBC QN AUTO: 29.6 PG (ref 24–34)
MCHC RBC AUTO-ENTMCNC: 33 G/DL (ref 31–37)
MCV RBC AUTO: 89.6 FL (ref 78–100)
NRBC # BLD: 0 K/UL (ref 0–0.01)
NRBC BLD-RTO: 0 PER 100 WBC
PLATELET # BLD AUTO: 202 K/UL (ref 135–420)
PMV BLD AUTO: 10.1 FL (ref 9.2–11.8)
POTASSIUM SERPL-SCNC: 3.4 MMOL/L (ref 3.5–5.5)
RBC # BLD AUTO: 4.83 M/UL (ref 4.2–5.3)
SODIUM SERPL-SCNC: 143 MMOL/L (ref 136–145)
WBC # BLD AUTO: 6.3 K/UL (ref 4.6–13.2)

## 2024-02-19 PROCEDURE — 71046 X-RAY EXAM CHEST 2 VIEWS: CPT

## 2024-02-19 PROCEDURE — 80048 BASIC METABOLIC PNL TOTAL CA: CPT

## 2024-02-19 PROCEDURE — 85027 COMPLETE CBC AUTOMATED: CPT

## 2024-02-19 PROCEDURE — 93005 ELECTROCARDIOGRAM TRACING: CPT

## 2024-02-19 RX ORDER — LOSARTAN POTASSIUM 25 MG/1
25 TABLET ORAL DAILY
COMMUNITY
Start: 2024-01-15 | End: 2025-01-14

## 2024-02-19 RX ORDER — ASPIRIN 325 MG
325 TABLET ORAL 2 TIMES DAILY
Qty: 60 TABLET | Refills: 0 | Status: SHIPPED | OUTPATIENT
Start: 2024-02-19

## 2024-02-19 RX ORDER — ROSUVASTATIN CALCIUM 5 MG/1
TABLET, COATED ORAL
COMMUNITY
Start: 2023-12-24

## 2024-02-19 RX ORDER — OXYCODONE HYDROCHLORIDE AND ACETAMINOPHEN 5; 325 MG/1; MG/1
1 TABLET ORAL
Qty: 30 TABLET | Refills: 0 | Status: SHIPPED | OUTPATIENT
Start: 2024-02-19 | End: 2024-02-27

## 2024-02-19 RX ORDER — CEPHALEXIN 500 MG/1
500 CAPSULE ORAL EVERY 8 HOURS
Qty: 21 CAPSULE | Refills: 0 | Status: SHIPPED | OUTPATIENT
Start: 2024-02-19 | End: 2024-02-26

## 2024-02-19 RX ORDER — ONDANSETRON 8 MG/1
8 TABLET, ORALLY DISINTEGRATING ORAL EVERY 8 HOURS PRN
Qty: 20 TABLET | Refills: 0 | Status: SHIPPED | OUTPATIENT
Start: 2024-02-19 | End: 2024-02-26

## 2024-02-19 NOTE — PROGRESS NOTES
Name: Judy Samayoa    : 1960     [facility]  [address], [address]  [facility] [address]  Dept: [phone]  Dept Fax: [phone]     Chief Complaint   Patient presents with    Knee Pain        There were no vitals taken for this visit.     No Known Allergies     Current Outpatient Medications   Medication Sig Dispense Refill    losartan (COZAAR) 25 MG tablet Take 1 tablet by mouth daily      rosuvastatin (CRESTOR) 5 MG tablet       alendronate (FOSAMAX) 35 MG tablet       DULoxetine (CYMBALTA) 60 MG extended release capsule       hydroCHLOROthiazide (MICROZIDE) 12.5 MG capsule       estradiol (CLIMARA) 0.025 MG/24HR ceived the following from [facility]: Outside name: estradioL (CLIMARA) 0.025 mg/24 hr      gabapentin (NEURONTIN) 300 MG capsule TAKE 1 CAPSULE THREE TIMES DAILY      insulin aspart (NOVOLOG) 100 UNIT/ML injection vial Use as directed in insulin pump, max daily dose 100 units, 90 days      LORazepam (ATIVAN) 0.5 MG tablet Take 0.5 mg by mouth 2 times daily as needed.      montelukast (SINGULAIR) 10 MG tablet TAKE 1 TABLET EVERY DAY      traMADol (ULTRAM) 50 MG tablet TAKE 1 TABLET BY MOUTH EVERY 4 HOURS AS NEEDED FOR PAIN       No current facility-administered medications for this visit.      Patient Active Problem List   Diagnosis    DDD (degenerative disc disease), lumbar    Depression    Advanced care planning/counseling discussion    Benign essential HTN    Diverticulosis    Type 2 diabetes mellitus with diabetic neuropathy (HCC)    NEYMAR (obstructive sleep apnea)    Urinary frequency    GERD (gastroesophageal reflux disease)    Dysphagia    Lumbar stenosis    Status post gastric banding    Diabetes mellitus (HCC)    Mixed hyperlipidemia      Family History   Problem Relation Age of Onset    Post-op Nausea/Vomiting Mother     Delayed Awakening Mother     Psychiatric Disorder

## 2024-02-20 LAB
BACTERIA SPEC CULT: NORMAL
BACTERIA SPEC CULT: NORMAL
Lab: NORMAL

## 2024-03-14 ENCOUNTER — TELEMEDICINE (OUTPATIENT)
Age: 64
End: 2024-03-14

## 2024-03-14 DIAGNOSIS — Z96.652 STATUS POST TOTAL KNEE REPLACEMENT, LEFT: ICD-10-CM

## 2024-03-14 DIAGNOSIS — M25.562 LEFT KNEE PAIN, UNSPECIFIED CHRONICITY: Primary | ICD-10-CM

## 2024-03-14 PROCEDURE — 99024 POSTOP FOLLOW-UP VISIT: CPT

## 2024-03-14 NOTE — PROGRESS NOTES
in 2-3 weeks virtually for the left knee to ensure patient is progressing off assistive device, recheck flexion, and as needed or if symptoms worsen.    Visit Diagnoses         Codes    Left knee pain, unspecified chronicity    -  Primary M25.562    Status post total knee replacement, left     Z96.652            As part of continued conservative pain management options the patient was advised to utilize Tylenol or OTC NSAIDS as long as it is not medically contraindicated.   Total Time: minutes: 5-10 minutes  Judy Samayoa was evaluated through a synchronous (real-time) audio encounter. Patient identification was verified at the start of the visit. She (or guardian if applicable) is aware that this is a billable service, which includes applicable co-pays. This visit was conducted with the patient's (and/or legal guardian's) verbal consent. She has not had a related appointment within my department in the past 7 days or scheduled within the next 24 hours.   The patient was located at Home: [address] [address].  The provider was located at Facility (Appt Dept): [address], [place] [address].    Note: not billable if this call serves to triage the patient into an appointment for the relevant concern    - NAKUL Cash, CNP

## 2024-03-14 NOTE — PATIENT INSTRUCTIONS
Post Operative Total Knee Replacement Instructions    PLEASE REMOVE YOUR LONG WHITE BANDAGE & STOCKING PRIOR TO CONNECTING TO YOUR APPOINTMENT       During your recovery from a total knee replacement, you will be participating in an OUTPATIENT physical therapy program. Your goal is to progress from a walker to a cane to nothing at all while walking, if possible, over the next 2 weeks.     You can now shower and get your incision wet, pat it dry afterwards. No further dressing changes will be required as long as there is no drainage.  You may take a bath 3 weeks post surgery as long as there is no drainage from your incision.    You may drive if you are not using any assistive devices to walk and are not using any narcotic pain medication.     You may discontinue your aspirin (if that is your primary blood thinner prescribed by Dr. Kellogg ) when you are at least 4 weeks out from surgery and are no longer using a cane or walker.  If you are still using assistive devices, please DO NOT stop the aspirin until you are completely off them.  If you are on other blood thinners prescribed by another doctor please continue that until you are instructed to discontinue them.    You and your physical therapist will determine when to stop your physical therapy program.    Narcotic pain medication can cause constipation.  You may take over the counter stool softeners such as Docusate Sodium or Miralax 1-2 times per day to assist with the constipation.  Ensure you are taking in plenty of fluids and fiber as well.    If you require a refill on a narcotic pain medication, please let Dr. Kellogg or his Nurse Practitioner know at your appointment today or AT LEAST 48 hours prior to needing it. No refills will be provided after hours or during weekends.    If you experience any significant calf pain, swelling, or shortness of breath, please call our office immediately or go to the nearest emergency room.    If you notice any significant

## 2024-03-15 ENCOUNTER — PATIENT MESSAGE (OUTPATIENT)
Age: 64
End: 2024-03-15

## 2024-03-15 DIAGNOSIS — Z96.652 STATUS POST TOTAL KNEE REPLACEMENT, LEFT: ICD-10-CM

## 2024-03-15 DIAGNOSIS — M25.562 LEFT KNEE PAIN, UNSPECIFIED CHRONICITY: Primary | ICD-10-CM

## 2024-03-15 RX ORDER — OXYCODONE HYDROCHLORIDE AND ACETAMINOPHEN 5; 325 MG/1; MG/1
1 TABLET ORAL
Qty: 30 TABLET | Refills: 0 | Status: SHIPPED | OUTPATIENT
Start: 2024-03-15 | End: 2024-03-23

## 2024-04-03 ENCOUNTER — TELEMEDICINE (OUTPATIENT)
Age: 64
End: 2024-04-03

## 2024-04-03 DIAGNOSIS — M25.562 LEFT KNEE PAIN, UNSPECIFIED CHRONICITY: Primary | ICD-10-CM

## 2024-04-03 DIAGNOSIS — Z96.652 STATUS POST TOTAL KNEE REPLACEMENT, LEFT: ICD-10-CM

## 2024-04-03 PROCEDURE — 99024 POSTOP FOLLOW-UP VISIT: CPT

## 2024-04-03 RX ORDER — AMITRIPTYLINE HYDROCHLORIDE 10 MG/1
TABLET, FILM COATED ORAL
COMMUNITY
Start: 2024-02-23

## 2024-04-03 RX ORDER — CYCLOBENZAPRINE HCL 10 MG
TABLET ORAL
COMMUNITY
Start: 2024-03-06

## 2024-04-03 RX ORDER — CLOTRIMAZOLE 10 MG/1
LOZENGE ORAL; TOPICAL
COMMUNITY
Start: 2024-03-26

## 2024-04-03 RX ORDER — SEMAGLUTIDE 0.68 MG/ML
INJECTION, SOLUTION SUBCUTANEOUS
COMMUNITY
Start: 2024-03-30

## 2024-04-03 RX ORDER — MECLIZINE HCL 12.5 MG/1
12.5 TABLET ORAL 3 TIMES DAILY PRN
COMMUNITY
Start: 2024-03-28 | End: 2025-03-28

## 2024-04-03 RX ORDER — NABUMETONE 500 MG/1
TABLET, FILM COATED ORAL
COMMUNITY
Start: 2024-03-06

## 2024-04-03 RX ORDER — METOPROLOL SUCCINATE 50 MG/1
50 TABLET, EXTENDED RELEASE ORAL DAILY
COMMUNITY
Start: 2024-03-28

## 2024-04-03 NOTE — PROGRESS NOTES
Judy Samayoa (:  1960) is a Established patient, evaluated via telephone on 4/3/2024    [facility]  [address]  [facility] [facility] [address]  Dept: [phone]  Dept Fax: [phone]   Chief Complaint   Patient presents with    Post-Op Check    Knee Pain     Patient-Reported Vitals  No data recorded   No Known Allergies  Current Outpatient Medications   Medication Sig Dispense Refill    amitriptyline (ELAVIL) 10 MG tablet       betamethasone valerate (VALISONE) 0.1 % ointment       clotrimazole (MYCELEX) 10 MG ye DISSOLVE 1 TABLET BY MOUTH THREE TIMES DAILY      cyclobenzaprine (FLEXERIL) 10 MG tablet       meclizine (ANTIVERT) 12.5 MG tablet Take 1 tablet by mouth 3 times daily as needed      metoprolol succinate (TOPROL XL) 50 MG extended release tablet Take 1 tablet by mouth daily      mirabegron (MYRBETRIQ) 50 MG TB24 Take 50 mg by mouth daily      nabumetone (RELAFEN) 500 MG tablet       OZEMPIC, 0.25 OR 0.5 MG/DOSE, 2 MG/3ML SOPN       losartan (COZAAR) 25 MG tablet Take 1 tablet by mouth daily      rosuvastatin (CRESTOR) 5 MG tablet       aspirin 325 MG tablet Take 1 tablet by mouth in the morning and at bedtime DO NOT START MEDICATION UNTIL 24 HOURS AFTER SURGERY 60 tablet 0    alendronate (FOSAMAX) 35 MG tablet       DULoxetine (CYMBALTA) 60 MG extended release capsule       hydroCHLOROthiazide (MICROZIDE) 12.5 MG capsule       estradiol (CLIMARA) 0.025 MG/24HR ceived the following from [facility]: Outside name: estradioL (CLIMARA) 0.025 mg/24 hr      gabapentin (NEURONTIN) 300 MG capsule TAKE 1 CAPSULE THREE TIMES DAILY      insulin aspart (NOVOLOG) 100 UNIT/ML injection vial Use as directed in insulin pump, max daily dose 100 units, 90 days      LORazepam (ATIVAN) 0.5 MG tablet Take 0.5 mg by mouth 2 times daily as needed.      montelukast (SINGULAIR) 10 MG tablet TAKE 1 TABLET

## 2025-04-17 NOTE — TELEPHONE ENCOUNTER
PAIN MANAGEMENT    HOME CARE INSTRUCTIONS   Do not use heat (such as a heating pad) for 24 hours.  You may apply an ice pack to the injection site for 20 minutes at a time for the first 24 hours for soreness/discomfort at injection site   Keep site clean and dry for 24 hours. If bandaid is present, remove when desired.              Do not soak for 48 hrs.   Do not drive until tomorrow.  Take care when walking after a lumbar injection.   Resume home medication as prescribed today.  Resume Aspirin, Plavix, or Coumadin the day after the procedure unless other wise instructed.      BLOCKS  Resume regular activities today.  Pain office will call in next 2 days.      CALL PHYSICIAN FOR:  Severe increase in your usual pain or the appearance of new pain.  Prolonged or increasing weakness or numbness in the legs or arms.  Fever greater than 100 degrees F.  Drainage, redness, active bleeding, or increased swelling at the injection site.  Headache that increases when your head is upright and decreases when you lie flat.    FOR EMERGENCIES:   Go directly to the emergency department for any shortness of breath, chest pain, or problems breathing.    Phone gabapentin

## 2025-07-10 NOTE — PROGRESS NOTES
Abhilash Hypertension 7/14/2020    Hypertension     Hyperthyroidism     Mixed hyperlipidemia 3/2/2022    Morbid obesity ([address])     Psychiatric disorder     DEPRESSION    Sleep apnea     no CPAP    Stool color black     Sun-damaged skin     Tanning bed exposure         I have reviewed and agree with [address] and [facility] and intake form in chart and the record furthermore I have reviewed prior medical record(s) regarding this patients care during this appointment. Review of Systems:   Patient is a pleasant appearing individual, appropriately dressed, well hydrated, well nourished, who is alert, appropriately oriented for age, and in no acute distress with a normal gait and normal affect who does not appear to be in any significant pain. 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 Patient is not able to walk distances longer than 15 minutes, also unable to do activities that involve squatting or kneeling. Due to the severe nature of patients osteoarthritis patient is unable to complete a formal physical therapy program due to pain severity, this also would have no benefit for the patient with grade 4 Kellgren-Moody. Inpatient status: The patient has admitted to severe pain in the affected knee and due to such pain they are unable to complete activities of daily living at home and/or work on a regular basis where conservative treatments have failed.  After extensive discussion with the patient, they